# Patient Record
Sex: FEMALE | Race: WHITE | NOT HISPANIC OR LATINO | Employment: OTHER | URBAN - METROPOLITAN AREA
[De-identification: names, ages, dates, MRNs, and addresses within clinical notes are randomized per-mention and may not be internally consistent; named-entity substitution may affect disease eponyms.]

---

## 2017-03-10 ENCOUNTER — OFFICE VISIT (OUTPATIENT)
Dept: URGENT CARE | Facility: CLINIC | Age: 77
End: 2017-03-10
Payer: COMMERCIAL

## 2017-03-10 PROCEDURE — 99213 OFFICE O/P EST LOW 20 MIN: CPT

## 2017-03-10 PROCEDURE — S9088 SERVICES PROVIDED IN URGENT: HCPCS

## 2017-03-31 ENCOUNTER — TRANSCRIBE ORDERS (OUTPATIENT)
Dept: LAB | Facility: CLINIC | Age: 77
End: 2017-03-31

## 2017-03-31 ENCOUNTER — APPOINTMENT (OUTPATIENT)
Dept: LAB | Facility: CLINIC | Age: 77
End: 2017-03-31
Payer: COMMERCIAL

## 2017-03-31 ENCOUNTER — ALLSCRIPTS OFFICE VISIT (OUTPATIENT)
Dept: OTHER | Facility: OTHER | Age: 77
End: 2017-03-31

## 2017-03-31 DIAGNOSIS — Z12.11 ENCOUNTER FOR SCREENING FOR MALIGNANT NEOPLASM OF COLON: ICD-10-CM

## 2017-03-31 DIAGNOSIS — M10.9 GOUT: ICD-10-CM

## 2017-03-31 DIAGNOSIS — M81.0 AGE-RELATED OSTEOPOROSIS WITHOUT CURRENT PATHOLOGICAL FRACTURE: ICD-10-CM

## 2017-03-31 DIAGNOSIS — Z12.31 ENCOUNTER FOR SCREENING MAMMOGRAM FOR MALIGNANT NEOPLASM OF BREAST: ICD-10-CM

## 2017-03-31 DIAGNOSIS — I10 ESSENTIAL (PRIMARY) HYPERTENSION: ICD-10-CM

## 2017-03-31 DIAGNOSIS — E78.5 HYPERLIPIDEMIA: ICD-10-CM

## 2017-03-31 LAB
25(OH)D3 SERPL-MCNC: 10.8 NG/ML (ref 30–100)
ALBUMIN SERPL BCP-MCNC: 3.9 G/DL (ref 3.5–5)
ALP SERPL-CCNC: 75 U/L (ref 46–116)
ALT SERPL W P-5'-P-CCNC: 21 U/L (ref 12–78)
ANION GAP SERPL CALCULATED.3IONS-SCNC: 6 MMOL/L (ref 4–13)
AST SERPL W P-5'-P-CCNC: 11 U/L (ref 5–45)
BASOPHILS # BLD AUTO: 0.06 THOUSANDS/ΜL (ref 0–0.1)
BASOPHILS NFR BLD AUTO: 1 % (ref 0–1)
BILIRUB SERPL-MCNC: 0.45 MG/DL (ref 0.2–1)
BUN SERPL-MCNC: 35 MG/DL (ref 5–25)
CALCIUM SERPL-MCNC: 9.4 MG/DL (ref 8.3–10.1)
CHLORIDE SERPL-SCNC: 104 MMOL/L (ref 100–108)
CO2 SERPL-SCNC: 29 MMOL/L (ref 21–32)
CREAT SERPL-MCNC: 1.31 MG/DL (ref 0.6–1.3)
CREAT UR-MCNC: 89 MG/DL
EOSINOPHIL # BLD AUTO: 0.69 THOUSAND/ΜL (ref 0–0.61)
EOSINOPHIL NFR BLD AUTO: 9 % (ref 0–6)
ERYTHROCYTE [DISTWIDTH] IN BLOOD BY AUTOMATED COUNT: 14.2 % (ref 11.6–15.1)
GFR SERPL CREATININE-BSD FRML MDRD: 39.4 ML/MIN/1.73SQ M
GLUCOSE P FAST SERPL-MCNC: 92 MG/DL (ref 65–99)
HCT VFR BLD AUTO: 39 % (ref 34.8–46.1)
HGB BLD-MCNC: 12.3 G/DL (ref 11.5–15.4)
LYMPHOCYTES # BLD AUTO: 1.9 THOUSANDS/ΜL (ref 0.6–4.47)
LYMPHOCYTES NFR BLD AUTO: 25 % (ref 14–44)
MCH RBC QN AUTO: 28.9 PG (ref 26.8–34.3)
MCHC RBC AUTO-ENTMCNC: 31.5 G/DL (ref 31.4–37.4)
MCV RBC AUTO: 92 FL (ref 82–98)
MICROALBUMIN UR-MCNC: 6.2 MG/L (ref 0–20)
MICROALBUMIN/CREAT 24H UR: 7 MG/G CREATININE (ref 0–30)
MONOCYTES # BLD AUTO: 0.6 THOUSAND/ΜL (ref 0.17–1.22)
MONOCYTES NFR BLD AUTO: 8 % (ref 4–12)
NEUTROPHILS # BLD AUTO: 4.39 THOUSANDS/ΜL (ref 1.85–7.62)
NEUTS SEG NFR BLD AUTO: 57 % (ref 43–75)
NRBC BLD AUTO-RTO: 0 /100 WBCS
PLATELET # BLD AUTO: 307 THOUSANDS/UL (ref 149–390)
PMV BLD AUTO: 11.2 FL (ref 8.9–12.7)
POTASSIUM SERPL-SCNC: 4.3 MMOL/L (ref 3.5–5.3)
PROT SERPL-MCNC: 8.3 G/DL (ref 6.4–8.2)
RBC # BLD AUTO: 4.25 MILLION/UL (ref 3.81–5.12)
SODIUM SERPL-SCNC: 139 MMOL/L (ref 136–145)
TSH SERPL DL<=0.05 MIU/L-ACNC: 2.51 UIU/ML (ref 0.36–3.74)
URATE SERPL-MCNC: 5.5 MG/DL (ref 2–6.8)
WBC # BLD AUTO: 7.65 THOUSAND/UL (ref 4.31–10.16)

## 2017-03-31 PROCEDURE — 82043 UR ALBUMIN QUANTITATIVE: CPT

## 2017-03-31 PROCEDURE — 82570 ASSAY OF URINE CREATININE: CPT

## 2017-03-31 PROCEDURE — 84443 ASSAY THYROID STIM HORMONE: CPT

## 2017-03-31 PROCEDURE — 36415 COLL VENOUS BLD VENIPUNCTURE: CPT

## 2017-03-31 PROCEDURE — 84550 ASSAY OF BLOOD/URIC ACID: CPT

## 2017-03-31 PROCEDURE — 80053 COMPREHEN METABOLIC PANEL: CPT

## 2017-03-31 PROCEDURE — 85025 COMPLETE CBC W/AUTO DIFF WBC: CPT

## 2017-03-31 PROCEDURE — 82306 VITAMIN D 25 HYDROXY: CPT

## 2017-04-02 ENCOUNTER — GENERIC CONVERSION - ENCOUNTER (OUTPATIENT)
Dept: OTHER | Facility: OTHER | Age: 77
End: 2017-04-02

## 2017-04-19 ENCOUNTER — APPOINTMENT (OUTPATIENT)
Dept: LAB | Facility: CLINIC | Age: 77
End: 2017-04-19
Payer: COMMERCIAL

## 2017-04-19 ENCOUNTER — TRANSCRIBE ORDERS (OUTPATIENT)
Dept: LAB | Facility: CLINIC | Age: 77
End: 2017-04-19

## 2017-04-19 DIAGNOSIS — Z12.11 ENCOUNTER FOR SCREENING FOR MALIGNANT NEOPLASM OF COLON: ICD-10-CM

## 2017-04-19 LAB — HEMOCCULT STL QL IA: NEGATIVE

## 2017-04-19 PROCEDURE — G0328 FECAL BLOOD SCRN IMMUNOASSAY: HCPCS

## 2017-04-20 ENCOUNTER — GENERIC CONVERSION - ENCOUNTER (OUTPATIENT)
Dept: OTHER | Facility: OTHER | Age: 77
End: 2017-04-20

## 2017-05-25 ENCOUNTER — GENERIC CONVERSION - ENCOUNTER (OUTPATIENT)
Dept: OTHER | Facility: OTHER | Age: 77
End: 2017-05-25

## 2017-05-31 ENCOUNTER — GENERIC CONVERSION - ENCOUNTER (OUTPATIENT)
Dept: OTHER | Facility: OTHER | Age: 77
End: 2017-05-31

## 2017-10-02 ENCOUNTER — ALLSCRIPTS OFFICE VISIT (OUTPATIENT)
Dept: OTHER | Facility: OTHER | Age: 77
End: 2017-10-02

## 2017-10-02 DIAGNOSIS — E78.5 HYPERLIPIDEMIA: ICD-10-CM

## 2017-10-02 DIAGNOSIS — I10 ESSENTIAL (PRIMARY) HYPERTENSION: ICD-10-CM

## 2017-10-03 NOTE — PROGRESS NOTES
Assessment  1  Hypertension (401 9) (I10)   2  CKD (chronic kidney disease), stage III (585 3) (N18 3)   3  Gout (274 9) (M10 9)   4  Hyperlipidemia (272 4) (E78 5)   5  Osteoporosis (733 00) (M81 0)   6  Peripheral vascular disease (443 9) (I73 9)    Plan  Gout    · Allopurinol 100 MG Oral Tablet; TAKE 1 TABLET DAILY  Health Maintenance    · Fluzone High-Dose 0 5 ML Intramuscular Suspension Prefilled Syringe; INJECT 0 5  ML  Intramuscular; To Be Done: [id number]  Hyperlipidemia    · Simvastatin 40 MG Oral Tablet; TAKE 1 TABLET DAILY   · (1) LIPID PANEL FASTING W DIRECT LDL REFLEX; Status:Active; Requested for:02Oct2017;    · Eat a low fat and low cholesterol diet ; Status:Complete;   Done: 10GJH2464  Hypertension    · Atenolol 25 MG Oral Tablet; TAKE 1 TABLET DAILY AS DIRECTED   · HydroCHLOROthiazide 50 MG Oral Tablet; TAKE 1 TABLET DAILY   · Lisinopril 40 MG Oral Tablet; TAKE 1 TABLET DAILY   · Pentoxifylline  MG Oral Tablet Extended Release; 1 capsule po 3 times daily   · (1) COMPREHENSIVE METABOLIC PANEL; Status:Active; Requested MUR:71XZQ5999;    · A diet low in sodium and high in potassium, magnesium, and calcium can help your blood pressure ;  Status:Complete;   Done: 67IRQ2174   · Begin a limited exercise program ; Status:Complete;   Done: 64IVO1101   · Begin or continue regular aerobic exercise  Gradually work up to at least {count1} sessions of  {dur1} of exercise a week ; Status:Complete;   Done: 02Oct2017  Need for influenza vaccination    · Fluzone High-Dose 0 5 ML Intramuscular Suspension Prefilled Syringe  Osteoporosis    · Alendronate Sodium 70 MG Oral Tablet; TAKE 1 TABLET ONCE WEEKLY  Peripheral vascular disease    · Clopidogrel Bisulfate 75 MG Oral Tablet; TAKE 1 TABLET DAILY    Discussion/Summary  Discussion Summary:   Doing well and will check labs  Reports OP readings are good and well continue with current treatments  Up date flu vaccine  RTC four months     Medication SE Review and Pt Understands Tx: Possible side effects of new medications were reviewed with the patient/guardian today  The treatment plan was reviewed with the patient/guardian  The patient/guardian understands and agrees with the treatment plan      Chief Complaint  Chief Complaint Free Text Note Form: RTN - Gout, HTN, Hypercholesterol  Melissa Sinks Melissa Sinks Melissa Sinks No complaints  History of Present Illness  HPI: WF RTC for f/u htn, hyperlipidemia, etc  Doing well and no c/o's  BP is up, but reports OP readings are good  Due for labs and flu vaccine  Gout (Follow-Up): The patient is being seen for follow-up of gout  The patient reports doing well  She has had no significant interval events  The patient is currently asymptomatic  Associated symptoms: no fever-and-no chills  Medications include naproxen and allopurinol  Medications:  the patient is adherent to her medication regimen, but-she denies medication side effects  Disease management:  the patient is doing well with her goals  Osteoporosis (Follow-Up): The patient's osteoporosis has been stable since the last visit  Most recent DXA results: T-score <-2 5  She has no comorbid illnesses  She has no complications  She has no significant interval events  Symptoms: The patient is currently asymptomatic  Associated symptoms include no decrease in height  Medications: the patient is adherent with her medication regimen -She denies medication side effects  Medication(s): vitamin D,-a calcium supplement-and-a bisphosphonate  Disease Management: the patient is doing well with her osteoporosis goals  Hyperlipidemia (Follow-Up): The patient states her hyperlipidemia has been under good control since the last visit  Comorbid Illnesses: peripheral vascular disease-and-hypertension  She has no significant interval events  Symptoms: The patient is currently asymptomatic  Associated symptoms include no focal neurologic deficits-and-no memory loss     Medications: the patient is adherent with her medication regimen -She denies medication side effects  Medication(s): a statin-and-fish oil  The patient is doing well with her hyperlipidemia goals  The patient is due for a lipid panel-and-liver function tests  Hypertension (Follow-Up): The patient presents for follow-up of essential hypertension  The patient states she has been doing well with her blood pressure control since the last visit  Comorbid Illnesses: a stroke-and-peripheral vascular disease  She has no significant interval events  Symptoms: The patient is currently asymptomatic  Associated symptoms include no headache-and-no focal neurologic deficits  Home monitoring: The patient checks her blood pressure regularly  Blood pressure control has been good  Medications: the patient is adherent with her medication regimen -She denies medication side effects  Medication(s): a diuretic,-a beta blocking agent-and-an ACE inhibitor  Disease Management: the patient is doing well with her blood pressure goals  The patient is due for a lipid panel-and-a serum creatinine  Peripheral Vascular Disease (Follow-Up): The patient is being seen for follow-up of peripheral vascular disease  The patient reports doing well  She has had no significant interval events  The patient is currently asymptomatic  Medications include clopidogrel (Plavix), pentoxifylline (Trental), statins and antihypertensives  Medications:  the patient is adherent to her medication regimen, but-she denies medication side effects  Disease management:  the patient is doing well with her goals  Due for: lipid profile  Review of Systems  Complete-Female:   Constitutional: no fever,-not feeling poorly,-no chills-and-not feeling tired  Cardiovascular: no chest pain,-no intermittent leg claudication,-no palpitations-and-no lower extremity edema  Respiratory: no shortness of breath,-no cough,-no orthopnea,-no wheezing,-no shortness of breath during exertion-and-no PND  Gastrointestinal: no abdominal pain,-no nausea,-no constipation-and-no diarrhea  Genitourinary: No complaints of dysuria, no incontinence, no pelvic pain, no dysmenorrhea, no vaginal discharge or bleeding  Musculoskeletal: No complaints of arthralgias, no myalgias, no joint swelling or stiffness, no limb pain or swelling  Integumentary: No complaints of skin rash or lesions, no itching, no skin wounds, no breast pain or lump  Neurological: no headache,-no confusion-and-no convulsions  Psychiatric: no anxiety-and-no depression  Endocrine: No complaints of proptosis, no hot flashes, no muscle weakness, no deepening of the voice, no feelings of weakness  Hematologic/Lymphatic: No complaints of swollen glands, no swollen glands in the neck, does not bleed easily, does not bruise easily  Active Problems  1  Abnormal EKG (794 31) (R94 31)   2  Abnormal mammogram (793 80) (R92 8)   3  Abnormal thyroid blood test (794 5) (R94 6)   4  CKD (chronic kidney disease), stage III (585 3) (N18 3)   5  Claudication (443 9) (I73 9)   6  Encounter for screening mammogram for malignant neoplasm of breast (V76 12) (Z12 31)   7  Gout (274 9) (M10 9)   8  Hyperlipidemia (272 4) (E78 5)   9  Hypertension (401 9) (I10)   10  Need for influenza vaccination (V04 81) (Z23)   11  Osteoporosis (733 00) (M81 0)   12  Peripheral vascular disease (443 9) (I73 9)   13  Post-menopausal (V49 81) (Z78 0)   14  Screen for colon cancer (V76 51) (Z12 11)   15  Screening for genitourinary condition (V81 6) (Z13 89)   16  Screening for neurological condition (V80 09) (Z13 89)   17  TIA (transient ischemic attack) (435 9) (G45 9)    Past Medical History  1  History of Diarrhea, unspecified type (787 91) (R19 7)  Active Problems And Past Medical History Reviewed: The active problems and past medical history were reviewed and updated today  Surgical History  1  History of Appendectomy   2  History of Hysterectomy   3   History of Tubal Ligation   4  History of Uterine Surgery  Surgical History Reviewed: The surgical history was reviewed and updated today  Family History  Mother    1  Family history of cerebrovascular accident (CVA) (V17 1) (Z82 3)  Father    2  Family history of Coal workers' pneumoconiosis   3  Family history of hypertension (V17 49) (Z82 49)  Family History Reviewed: The family history was reviewed and updated today  Social History   · Former smoker (T10 97) (T38 717)   · Four children   · Lives with adult children   · No alcohol use   · Retired   ·  (V61 07) (Z63 4)  Social History Reviewed: The social history was reviewed and updated today  The social history was reviewed and is unchanged  Current Meds   1  Alendronate Sodium 70 MG Oral Tablet; TAKE 1 TABLET ONCE WEEKLY; Therapy: 58CIH1837 to (Evaluate:21Sep2017)  Requested for: 93MEX4528; Last Rx:01Jun2017   Ordered   2  Allopurinol 100 MG Oral Tablet; TAKE 1 TABLET DAILY  Requested for: 14NGV2914; Last   Rx:31Mar2017 Ordered   3  Atenolol 25 MG Oral Tablet; TAKE 1 TABLET DAILY AS DIRECTED; Therapy: 97RPZ0532 to (Evaluate:26Mar2018)  Requested for: 16MGF0622; Last Rx:31Mar2017   Ordered   4  Clopidogrel Bisulfate 75 MG Oral Tablet; TAKE 1 TABLET DAILY; Therapy: 36UWA3151 to (Evaluate:26Mar2018)  Requested for: 95BUR1462; Last Rx:31Mar2017   Ordered   5  HydroCHLOROthiazide 50 MG Oral Tablet; TAKE 1 TABLET DAILY; Therapy: 69MZP5088 to (Evaluate:26Mar2018)  Requested for: 39PJY4890; Last Rx:31Mar2017   Ordered   6  Lisinopril 40 MG Oral Tablet; TAKE 1 TABLET DAILY; Therapy: 42AKE7919 to (Evaluate:26Mar2018)  Requested for: 00UGZ4783; Last Rx:31Mar2017   Ordered   7  Multi Adult Gummies Oral Tablet Chewable; CHEW AND SWALLOW 1 TABLET DAILY; Therapy: 10JWN8696 to Recorded   8  Omega 3 1000 MG Oral Capsule; Take 2 capsules three times daily; Therapy: 95WNE9045 to Recorded   9   Pentoxifylline  MG Oral Tablet Extended Release; 1 capsule po 3 times daily; Therapy: 33YMA3129 to (Evaluate:26Mar2018)  Requested for: 52EGP7294; Last Rx:31Mar2017   Ordered   10  Simvastatin 40 MG Oral Tablet; TAKE 1 TABLET DAILY; Therapy: 79BTW6455 to (Evaluate:26Mar2018)  Requested for: 88NEM9347; Last Rx:31Mar2017    Ordered  Medication List Reviewed: The medication list was reviewed and updated today  Allergies  1  Endocet TABS    Vitals  Vital Signs    Recorded: 23LOQ0491 04:29PM   Temperature 97 9 F   Heart Rate 72   Respiration 18   Systolic 719   Diastolic 60   Height 5 ft 4 in   Weight 168 lb    BMI Calculated 28 84   BSA Calculated 1 82     Physical Exam    Constitutional   General appearance: No acute distress, well appearing and well nourished  Eyes   Conjunctiva and lids: No swelling, erythema or discharge  Pupils and irises: Equal, round and reactive to light  Ears, Nose, Mouth, and Throat   Oropharynx: Normal with no erythema, edema, exudate or lesions  Pulmonary   Respiratory effort: No increased work of breathing or signs of respiratory distress  Auscultation of lungs: Clear to auscultation  Cardiovascular   Auscultation of heart: Normal rate and rhythm, normal S1 and S2, without murmurs  Examination of extremities for edema and/or varicosities: Normal     Carotid pulses: Normal     Abdomen   Abdomen: Non-tender, no masses      Musculoskeletal   Gait and station: Normal     Psychiatric   Orientation to person, place, and time: Normal     Mood and affect: Normal          Signatures   Electronically signed by : IAN Peterson ; Oct  2 2017  5:05PM EST                       (Author)

## 2017-10-31 ENCOUNTER — APPOINTMENT (OUTPATIENT)
Dept: LAB | Facility: CLINIC | Age: 77
End: 2017-10-31
Payer: COMMERCIAL

## 2017-10-31 ENCOUNTER — TRANSCRIBE ORDERS (OUTPATIENT)
Dept: LAB | Facility: CLINIC | Age: 77
End: 2017-10-31

## 2017-10-31 DIAGNOSIS — E78.5 HYPERLIPIDEMIA: ICD-10-CM

## 2017-10-31 DIAGNOSIS — I10 ESSENTIAL (PRIMARY) HYPERTENSION: ICD-10-CM

## 2017-10-31 LAB
ALBUMIN SERPL BCP-MCNC: 3.4 G/DL (ref 3.5–5)
ALP SERPL-CCNC: 77 U/L (ref 46–116)
ALT SERPL W P-5'-P-CCNC: 20 U/L (ref 12–78)
ANION GAP SERPL CALCULATED.3IONS-SCNC: 7 MMOL/L (ref 4–13)
AST SERPL W P-5'-P-CCNC: 14 U/L (ref 5–45)
BILIRUB SERPL-MCNC: 0.52 MG/DL (ref 0.2–1)
BUN SERPL-MCNC: 41 MG/DL (ref 5–25)
CALCIUM SERPL-MCNC: 8.6 MG/DL (ref 8.3–10.1)
CHLORIDE SERPL-SCNC: 104 MMOL/L (ref 100–108)
CHOLEST SERPL-MCNC: 218 MG/DL (ref 50–200)
CO2 SERPL-SCNC: 27 MMOL/L (ref 21–32)
CREAT SERPL-MCNC: 1.67 MG/DL (ref 0.6–1.3)
GFR SERPL CREATININE-BSD FRML MDRD: 29 ML/MIN/1.73SQ M
GLUCOSE P FAST SERPL-MCNC: 94 MG/DL (ref 65–99)
HDLC SERPL-MCNC: 46 MG/DL (ref 40–60)
LDLC SERPL CALC-MCNC: 147 MG/DL (ref 0–100)
POTASSIUM SERPL-SCNC: 3.9 MMOL/L (ref 3.5–5.3)
PROT SERPL-MCNC: 8 G/DL (ref 6.4–8.2)
SODIUM SERPL-SCNC: 138 MMOL/L (ref 136–145)
TRIGL SERPL-MCNC: 125 MG/DL

## 2017-10-31 PROCEDURE — 80061 LIPID PANEL: CPT

## 2017-10-31 PROCEDURE — 80053 COMPREHEN METABOLIC PANEL: CPT

## 2017-10-31 PROCEDURE — 36415 COLL VENOUS BLD VENIPUNCTURE: CPT

## 2017-11-01 ENCOUNTER — GENERIC CONVERSION - ENCOUNTER (OUTPATIENT)
Dept: OTHER | Facility: OTHER | Age: 77
End: 2017-11-01

## 2017-11-02 ENCOUNTER — OFFICE VISIT (OUTPATIENT)
Dept: URGENT CARE | Facility: CLINIC | Age: 77
End: 2017-11-02
Payer: COMMERCIAL

## 2017-11-02 DIAGNOSIS — N18.30 CHRONIC KIDNEY DISEASE, STAGE III (MODERATE) (HCC): ICD-10-CM

## 2017-11-02 PROCEDURE — S9088 SERVICES PROVIDED IN URGENT: HCPCS

## 2017-11-02 PROCEDURE — 99213 OFFICE O/P EST LOW 20 MIN: CPT

## 2017-11-03 NOTE — PROGRESS NOTES
Assessment  1  Sinusitis (473 9) (J32 9)    Plan  Sinusitis    · Amoxicillin 500 MG Oral Capsule; TAKE 1 CAPSULE 3 TIMES DAILY UNTIL GONE    Discussion/Summary  Discussion Summary:   Patient was given instructions with respect to the amoxicillin its use and sinusitis  Medication Side Effects Reviewed: Possible side effects of new medications were reviewed with the patient/guardian today  Understands and agrees with treatment plan: The treatment plan was reviewed with the patient/guardian  The patient/guardian understands and agrees with the treatment plan   Counseling Documentation With Imm: The patient was counseled regarding instructions for management  Immunization Counseling The parent/guardian was counseled on the following vaccine components:  Follow Up Instructions: Follow Up with your Primary Care Provider in 3-5 days  If your symptoms worsen, go to the nearest [facility] Emergency Department  Chief Complaint  1  Ear Pain  Chief Complaint Free Text Note Form: Pt c/o bilateral ear pain and sinus pressure since Friday  History of Present Illness  HPI: This patient is a very pleasant 49-year-old female who complains of sinus pressure in her frontal and maxillary sinuses since Monday  Over the weekend she was doing well now her ear seen full  She denies any fever or chills her throat is normal and she has no cough she did have a slight nose bleed in the last 24 hours from the right nostril  Her medications and allergies are reviewed   Hospital Based Practices Required Assessment:   Pain Assessment   the patient states they have pain  The pain is located in the sinus bilaterally  (on a scale of 0 to 10, the patient rates the pain at 4 )   Abuse And Domestic Violence Screen    Yes, the patient is safe at home  -- The patient states no one is hurting them  Depression And Suicide Screen  No, the patient has not had thoughts of hurting themself     No, the patient has not felt depressed in the past 7 days  Prefered Language is  [place]  Primary Language is  English  Education Completed: disease/condition-- and-- medications   Teaching Method: verbal   Person Taught: patient   Evaluation Of Learning: verbalized/demonstrated understanding      Review of Systems  Focused-Female:   Constitutional: No fever, no chills, feels well, no tiredness, no recent weight gain or loss  ENT: as noted in HPI  Cardiovascular: no complaints of slow or fast heart rate, no chest pain, no palpitations, no leg claudication or lower extremity edema  Respiratory: no complaints of shortness of breath, no wheezing, no dyspnea on exertion, no orthopnea or PND  Gastrointestinal: no complaints of abdominal pain, no constipation, no nausea or diarrhea, no vomiting, no bloody stools  Musculoskeletal: no complaints of arthralgia, no myalgia, no joint swelling or stiffness, no limb pain or swelling  Integumentary: no complaints of skin rash or lesion, no itching or dry skin, no skin wounds  Neurological: no complaints of headache, no confusion, no numbness or tingling, no dizziness or fainting  ROS Reviewed:   ROS reviewed  Active Problems  1  Abnormal EKG (794 31) (R94 31)   2  Abnormal mammogram (793 80) (R92 8)   3  Abnormal thyroid blood test (794 5) (R94 6)   4  CKD (chronic kidney disease), stage III (585 3) (N18 3)   5  Claudication (443 9) (I73 9)   6  Encounter for screening mammogram for malignant neoplasm of breast (V76 12)   (Z12 31)   7  Gout (274 9) (M10 9)   8  Hyperlipidemia (272 4) (E78 5)   9  Hypertension (401 9) (I10)   10  Need for influenza vaccination (V04 81) (Z23)   11  Osteoporosis (733 00) (M81 0)   12  Peripheral vascular disease (443 9) (I73 9)   13  Post-menopausal (V49 81) (Z78 0)   14  Screen for colon cancer (V76 51) (Z12 11)   15  Screening for genitourinary condition (V81 6) (Z13 89)   16  Screening for neurological condition (V80 09) (Z13 89)   17   TIA (transient ischemic attack) (435 9) (G45 9)    Past Medical History  1  History of Diarrhea, unspecified type (787 91) (R19 7)  Active Problems And Past Medical History Reviewed: The active problems and past medical history were reviewed and updated today  Family History  Mother    1  Family history of cerebrovascular accident (CVA) (V17 1) (Z82 3)  Father    2  Family history of Coal workers' pneumoconiosis   3  Family history of hypertension (V17 49) (Z82 49)  Family History Reviewed: The family history was reviewed and updated today  Social History   · Former smoker (H67 75) (I86 790)   · Four children   · Lives with adult children   · No alcohol use   · Retired   ·  (V61 07) (Z63 4)  Social History Reviewed: The social history was reviewed and updated today  Surgical History  1  History of Appendectomy   2  History of Hysterectomy   3  History of Tubal Ligation   4  History of Uterine Surgery  Surgical History Reviewed: The surgical history was reviewed and updated today  Current Meds   1  Alendronate Sodium 70 MG Oral Tablet; TAKE 1 TABLET ONCE WEEKLY; Therapy: 89RXU0746 to (Evaluate:10Ujb2095)  Requested for: 37BTP5788; Last   Rx:02Oct2017 Ordered   2  Allopurinol 100 MG Oral Tablet; TAKE 1 TABLET DAILY  Requested for: 32WZW4990; Last   Rx:02Oct2017 Ordered   3  Atenolol 25 MG Oral Tablet; TAKE 1 TABLET DAILY AS DIRECTED; Therapy: 15LNU7281 to (Evaluate:99Jeb6367)  Requested for: 02Oct2017; Last   Rx:02Oct2017 Ordered   4  Atorvastatin Calcium 40 MG Oral Tablet; Take 1 tablet daily as directed; Therapy: 22PAM6145 to (Last Rx:02Nov2017)  Requested for: 58ZEV5104 Ordered   5  Clopidogrel Bisulfate 75 MG Oral Tablet; TAKE 1 TABLET DAILY; Therapy: 91XFG0250 to (Evaluate:99Kav8832)  Requested for: 21GAC1909; Last   Rx:02Nov2017 Ordered   6  HydroCHLOROthiazide 50 MG Oral Tablet; TAKE 1 TABLET DAILY;    Therapy: 65KXW2315 to (Evaluate:99Lhv1469)  Requested for: 76JPJ6531; Last   Rx:02Oct2017 Ordered   7  Lisinopril 40 MG Oral Tablet; TAKE 1 TABLET DAILY; Therapy: 27IQJ3968 to (Evaluate:52Tzn7618)  Requested for: 02Oct2017; Last   Rx:02Oct2017 Ordered   8  Multi Adult Gummies Oral Tablet Chewable; CHEW AND SWALLOW 1 TABLET DAILY; Therapy: 56RUE9057 to Recorded   9  Omega 3 1000 MG Oral Capsule; Take 2 capsules three times daily; Therapy: 59KYY0566 to Recorded   10  Pentoxifylline  MG Oral Tablet Extended Release; 1 capsule po 3 times daily; Therapy: 77FWK5370 to (Evaluate:28Oct2018)  Requested for: 08HOE8226; Last    Rx:02Nov2017 Ordered    Allergies  1  Endocet TABS    Vitals  Signs   Recorded: 70BLX9143 03:08PM   Temperature: 98 F  Heart Rate: 61  Respiration: 18  Systolic: 772  Diastolic: 69  Weight: 863 lb 8 42 oz  BMI Calculated: 26 87  BSA Calculated: 1 76  O2 Saturation: 99    Physical Exam    Eyes   Conjunctiva and lids: No swelling, erythema or discharge  Pupils and irises: Equal, round and reactive to light  Ears, Nose, Mouth, and Throat percussion of the sinuses frontally and maxillary sinuses year old slight discomfort  Otoscopic examination: Tympanic membranes translucent with normal light reflex  Canals patent without erythema  Nasal mucosa, septum, and turbinates: Abnormal  -- there is slight scabbing at the right internal nares  Oropharynx: Normal with no erythema, edema, exudate or lesions  Pulmonary   Auscultation of lungs: Clear to auscultation  Cardiovascular   Auscultation of heart: Normal rate and rhythm, normal S1 and S2, without murmurs  Lymphatic   Palpation of lymph nodes in neck: No lymphadenopathy  Psychiatric   Orientation to person, place, and time: Normal        Future Appointments    Date/Time Provider Specialty Site   04/02/2018 05:15 PM IAN Adrian   Internal Medicine [address] [facility]     Signatures   Electronically signed by : Azar Roldan DO; Nov 2 2017  3:21PM EST                       (Author)

## 2018-01-12 VITALS
BODY MASS INDEX: 28.51 KG/M2 | SYSTOLIC BLOOD PRESSURE: 138 MMHG | WEIGHT: 167 LBS | HEART RATE: 70 BPM | HEIGHT: 64 IN | RESPIRATION RATE: 18 BRPM | TEMPERATURE: 97.7 F | DIASTOLIC BLOOD PRESSURE: 66 MMHG

## 2018-01-12 NOTE — RESULT NOTES
Verified Results  (1) COMPREHENSIVE METABOLIC PANEL 43ACP1136 58:88HH Faizan Filao Order Number: [id number]     Test Name Result Flag Reference   SODIUM 138 mmol/L  136-145   POTASSIUM 3 9 mmol/L  3 5-5 3   CHLORIDE 104 mmol/L  100-108   CARBON DIOXIDE 27 mmol/L  21-32   ANION GAP (CALC) 7 mmol/L  4-13   BLOOD UREA NITROGEN 41 mg/dL H 5-25   CREATININE 1 67 mg/dL H 0 60-1 30   Standardized to IDMS reference method   CALCIUM 8 6 mg/dL  8 3-10 1   BILI, TOTAL 0 52 mg/dL  0 20-1 00   ALK PHOSPHATAS 77 U/L     ALT (SGPT) 20 U/L  12-78   Specimen collection should occur prior to Sulfasalazine and/or Sulfapyridine administration due to the potential for falsely depressed results  AST(SGOT) 14 U/L  5-45   Specimen collection should occur prior to Sulfasalazine administration due to the potential for falsely depressed results  ALBUMIN 3 4 g/dL L 3 5-5 0   TOTAL PROTEIN 8 0 g/dL  6 4-8 2   eGFR 29 ml/min/1 73sq m     [facility] Disease Education Program recommendations are as follows:  GFR calculation is accurate only with a steady state creatinine  Chronic Kidney disease less than 60 ml/min/1 73 sq  meters  Kidney failure less than 15 ml/min/1 73 sq  meters  GLUCOSE FASTING 94 mg/dL  65-99   Specimen collection should occur prior to Sulfasalazine administration due to the potential for falsely depressed results  Specimen collection should occur prior to Sulfapyridine administration due to the potential for falsely elevated results       (1) LIPID PANEL FASTING W DIRECT LDL REFLEX 31Oct2017 08:34AM Faizan Filao Order Number: [id number]     Test Name Result Flag Reference   CHOLESTEROL 218 mg/dL H    LDL CHOLESTEROL CALCULATED 147 mg/dL H 0-100   Triglyceride:        Normal <150 mg/dl   Borderline High 150-199 mg/dl   High 200-499 mg/dl   Very High >499 mg/dl      Cholesterol:       Desirable <200 mg/dl    Borderline High 200-239 mg/dl    High >239 mg/dl      HDL Cholesterol:       High>59 mg/dL    Low <41 mg/dL      HDL Cholesterol:       High>59 mg/dL    Low <41 mg/dL      This screening LDL is a calculated result  It does not have the accuracy of the Direct Measured LDL in the monitoring of patients with hyperlipidemia and/or statin therapy  Direct Measure LDL (KUU206) must be ordered separately in these patients  TRIGLYCERIDES 125 mg/dL  <=150   Specimen collection should occur prior to N-Acetylcysteine or Metamizole administration due to the potential for falsely depressed results  HDL,DIRECT 46 mg/dL  40-60   Specimen collection should occur prior to Metamizole administration due to the potential for falsley depressed results

## 2018-01-14 VITALS
HEART RATE: 72 BPM | TEMPERATURE: 97.9 F | DIASTOLIC BLOOD PRESSURE: 60 MMHG | RESPIRATION RATE: 18 BRPM | SYSTOLIC BLOOD PRESSURE: 150 MMHG | HEIGHT: 64 IN | WEIGHT: 168 LBS | BODY MASS INDEX: 28.68 KG/M2

## 2018-01-16 NOTE — RESULT NOTES
Verified Results  (1) MICROALBUMIN CREATININE RATIO, RANDOM URINE [id number] 09:11AM ZeePearlner     Test Name Result Flag Reference   MICROALBUMIN/ CREAT R 7 mg/g creatinine  0-30   MICROALBUMIN,URINE 6 2 mg/L  0 0-20 0   CREATININE URINE 89 0 mg/dL       (1) URIC ACID [id number] 09:10AM [facility]     Test Name Result Flag Reference   URIC ACID 5 5 mg/dL  2 0-6 8   Specimen collection should occur prior to Metamizole administration due to the potential for falsely depressed results  - Patient Instructions: This bloodwork is non-fasting  Please drink two glasses of water morning of bloodwork  (1) COMPREHENSIVE METABOLIC PANEL [id number] 08:42BP [facility]     Test Name Result Flag Reference   SODIUM 139 mmol/L  136-145   POTASSIUM 4 3 mmol/L  3 5-5 3   CHLORIDE 104 mmol/L  100-108   CARBON DIOXIDE 29 mmol/L  21-32   ANION GAP (CALC) 6 mmol/L  4-13   BLOOD UREA NITROGEN 35 mg/dL H 5-25   CREATININE 1 31 mg/dL H 0 60-1 30   Standardized to IDMS reference method   CALCIUM 9 4 mg/dL  8 3-10 1   BILI, TOTAL 0 45 mg/dL  0 20-1 00   ALK PHOSPHATAS 75 U/L     ALT (SGPT) 21 U/L  12-78   AST(SGOT) 11 U/L  5-45   ALBUMIN 3 9 g/dL  3 5-5 0   TOTAL PROTEIN 8 3 g/dL H 6 4-8 2   eGFR Non-African American 39 4 ml/min/1 73sq m     - Patient Instructions: This bloodwork is non-fasting  Please drink two glasses of water morning of bloodwork  National Kidney Disease Education Program recommendations are as follows:  GFR calculation is accurate only with a steady state creatinine  Chronic Kidney disease less than 60 ml/min/1 73 sq  meters  Kidney failure less than 15 ml/min/1 73 sq  meters  GLUCOSE FASTING 92 mg/dL  65-99     (1) TSH [id number] 09:10AM ZeePearlner     Test Name Result Flag Reference   TSH 2 510 uIU/mL  0 358-3 740   - Patient Instructions: This bloodwork is non-fasting  Please drink two glasses of water morning of bloodwork  - Patient Instructions: This bloodwork is non-fasting  Please drink two glasses of water morning of bloodwork  Patients undergoing fluorescein dye angiography may retain small amounts of fluorescein in the body for 48-72 hours post procedure  Samples containing fluorescein can produce falsely depressed TSH values  If the patient had this procedure,a specimen should be resubmitted post fluorescein clearance  The recommended reference ranges for TSH during pregnancy are as follows:  First trimester 0 1 to 2 5 uIU/mL  Second trimester  0 2 to 3 0 uIU/mL  Third trimester 0 3 to 3 0 uIU/m     (1) CBC/PLT/DIFF [id number] 09:10AM Luis Macias     Test Name Result Flag Reference   WBC COUNT 7 65 Thousand/uL  4 31-10 16   RBC COUNT 4 25 Million/uL  3 81-5 12   HEMOGLOBIN 12 3 g/dL  11 5-15 4   HEMATOCRIT 39 0 %  34 8-46  1   MCV 92 fL  82-98   MCH 28 9 pg  26 8-34 3   MCHC 31 5 g/dL  31 4-37 4   RDW 14 2 %  11 6-15 1   MPV 11 2 fL  8 9-12 7   PLATELET COUNT 954 Thousands/uL  149-390   nRBC AUTOMATED 0 /100 WBCs     NEUTROPHILS RELATIVE PERCENT 57 %  43-75   LYMPHOCYTES RELATIVE PERCENT 25 %  14-44   MONOCYTES RELATIVE PERCENT 8 %  4-12   EOSINOPHILS RELATIVE PERCENT 9 % H 0-6   BASOPHILS RELATIVE PERCENT 1 %  0-1   NEUTROPHILS ABSOLUTE COUNT 4 39 Thousands/? ??L  1 85-7 62   LYMPHOCYTES ABSOLUTE COUNT 1 90 Thousands/? ??L  0 60-4 47   MONOCYTES ABSOLUTE COUNT 0 60 Thousand/? ??L  0 17-1 22   EOSINOPHILS ABSOLUTE COUNT 0 69 Thousand/? ??L H 0 00-0 61   BASOPHILS ABSOLUTE COUNT 0 06 Thousands/? ??L  0 00-0 10   - Patient Instructions: This bloodwork is non-fasting  Please drink two glasses of water morning of bloodwork  - Patient Instructions: This bloodwork is non-fasting  Please drink two glasses of water morning of bloodwork       (1) VITAMIN D 25-HYDROXY [id number] 09:10AM Luis Macias     Test Name Result Flag Reference   VIT D 25-HYDROX 10 8 ng/mL L 30 0-100 0   This assay is a certified procedure of the CDC Vitamin D Standardization Certification Program (VDSCP) Deficiency <20ng/ml   Insufficiency 20-30ng/ml   Sufficient  ng/ml     *Patients undergoing fluorescein dye angiography may retain small amounts of fluorescein in the body for 48-72 hours post procedure  Samples containing fluorescein can produce falsely elevated Vitamin D values  If the patient had this procedure, a specimen should be resubmitted post fluorescein clearance

## 2018-01-17 NOTE — RESULT NOTES
Verified Results  (1) OCCULT BLOOD, FECAL IMMUNOCHEMICAL TEST 19Apr2017 02:30PM Elsi Wagner Order Number: [id number]     Test Name Result Flag Reference   OCCULT BLD, FECAL IMMUNOLOGICAL Negative  Negative   Performed by Fecal Immunochemical Test

## 2018-03-29 RX ORDER — SIMVASTATIN 40 MG
1 TABLET ORAL DAILY
COMMUNITY
Start: 2016-11-10 | End: 2018-04-02 | Stop reason: SDUPTHER

## 2018-03-29 RX ORDER — CITALOPRAM 20 MG/1
TABLET ORAL
COMMUNITY
Start: 2009-03-03 | End: 2018-04-02

## 2018-03-29 RX ORDER — ATENOLOL 25 MG/1
1 TABLET ORAL DAILY
COMMUNITY
Start: 2016-11-10 | End: 2018-04-02 | Stop reason: SDUPTHER

## 2018-03-29 RX ORDER — ALENDRONATE SODIUM 70 MG/1
1 TABLET ORAL WEEKLY
COMMUNITY
Start: 2017-06-01 | End: 2018-04-02 | Stop reason: SDUPTHER

## 2018-03-29 RX ORDER — HYDROCHLOROTHIAZIDE 50 MG/1
1 TABLET ORAL DAILY
COMMUNITY
Start: 2016-11-10 | End: 2018-04-02 | Stop reason: SDUPTHER

## 2018-03-29 RX ORDER — LISINOPRIL 40 MG/1
1 TABLET ORAL DAILY
COMMUNITY
Start: 2016-11-10 | End: 2018-04-02 | Stop reason: SDUPTHER

## 2018-03-29 RX ORDER — OMEGA-3-ACID ETHYL ESTERS 1 G/1
CAPSULE, LIQUID FILLED ORAL
COMMUNITY
Start: 2011-11-21 | End: 2018-04-02

## 2018-03-29 RX ORDER — ASCORBIC ACID 125 MG
1 TABLET,CHEWABLE ORAL DAILY
COMMUNITY
Start: 2016-11-10

## 2018-03-29 RX ORDER — BENZONATATE 100 MG/1
100 CAPSULE ORAL
COMMUNITY
Start: 2017-05-25 | End: 2018-04-02

## 2018-03-29 RX ORDER — ELECTROLYTES/DEXTROSE
SOLUTION, ORAL ORAL
COMMUNITY
Start: 2004-07-26 | End: 2018-04-02

## 2018-03-29 RX ORDER — PENTOXIFYLLINE 400 MG/1
TABLET, EXTENDED RELEASE ORAL
COMMUNITY
Start: 2016-11-10 | End: 2018-04-02 | Stop reason: SDUPTHER

## 2018-03-29 RX ORDER — POTASSIUM CHLORIDE 20 MEQ/1
TABLET, EXTENDED RELEASE ORAL
COMMUNITY
Start: 2009-03-25 | End: 2018-04-02

## 2018-03-29 RX ORDER — CLOPIDOGREL BISULFATE 75 MG/1
1 TABLET ORAL DAILY
COMMUNITY
Start: 2016-11-10 | End: 2018-04-02 | Stop reason: SDUPTHER

## 2018-03-29 RX ORDER — ALLOPURINOL 100 MG/1
1 TABLET ORAL DAILY
COMMUNITY
End: 2018-04-02 | Stop reason: SDUPTHER

## 2018-03-29 RX ORDER — CHLORAL HYDRATE 500 MG
CAPSULE ORAL
COMMUNITY
Start: 2016-11-10 | End: 2018-04-02

## 2018-03-30 PROBLEM — I73.9 CLAUDICATION (HCC): Status: ACTIVE | Noted: 2018-03-30

## 2018-03-30 PROBLEM — R79.89 ABNORMAL THYROID BLOOD TEST: Status: ACTIVE | Noted: 2017-03-31

## 2018-03-30 PROBLEM — G45.9 TIA (TRANSIENT ISCHEMIC ATTACK): Status: ACTIVE | Noted: 2017-03-31

## 2018-03-30 PROBLEM — N18.30 CKD (CHRONIC KIDNEY DISEASE), STAGE III (HCC): Status: ACTIVE | Noted: 2017-03-31

## 2018-03-30 PROBLEM — R92.8 ABNORMAL MAMMOGRAM: Status: ACTIVE | Noted: 2017-03-31

## 2018-03-30 PROBLEM — M35.3 POLYMYALGIA RHEUMATICA (HCC): Status: ACTIVE | Noted: 2018-03-30

## 2018-04-02 ENCOUNTER — OFFICE VISIT (OUTPATIENT)
Dept: INTERNAL MEDICINE CLINIC | Facility: CLINIC | Age: 78
End: 2018-04-02
Payer: COMMERCIAL

## 2018-04-02 VITALS
SYSTOLIC BLOOD PRESSURE: 152 MMHG | RESPIRATION RATE: 18 BRPM | DIASTOLIC BLOOD PRESSURE: 78 MMHG | BODY MASS INDEX: 27.14 KG/M2 | WEIGHT: 159 LBS | HEIGHT: 64 IN | TEMPERATURE: 99.6 F | HEART RATE: 72 BPM

## 2018-04-02 DIAGNOSIS — I63.9 CEREBROVASCULAR ACCIDENT (CVA), UNSPECIFIED MECHANISM (HCC): ICD-10-CM

## 2018-04-02 DIAGNOSIS — E78.2 MIXED HYPERLIPIDEMIA: Primary | ICD-10-CM

## 2018-04-02 DIAGNOSIS — R79.89 ABNORMAL THYROID BLOOD TEST: ICD-10-CM

## 2018-04-02 DIAGNOSIS — M35.3 POLYMYALGIA RHEUMATICA (HCC): ICD-10-CM

## 2018-04-02 DIAGNOSIS — Z12.11 SCREEN FOR COLON CANCER: ICD-10-CM

## 2018-04-02 DIAGNOSIS — M1A.9XX0 CHRONIC GOUT WITHOUT TOPHUS, UNSPECIFIED CAUSE, UNSPECIFIED SITE: ICD-10-CM

## 2018-04-02 DIAGNOSIS — I73.9 INTERMITTENT CLAUDICATION (HCC): ICD-10-CM

## 2018-04-02 DIAGNOSIS — N18.30 CKD (CHRONIC KIDNEY DISEASE), STAGE III (HCC): ICD-10-CM

## 2018-04-02 DIAGNOSIS — R41.3 MEMORY DIFFICULTIES: ICD-10-CM

## 2018-04-02 DIAGNOSIS — I10 ESSENTIAL HYPERTENSION: ICD-10-CM

## 2018-04-02 DIAGNOSIS — M81.0 AGE-RELATED OSTEOPOROSIS WITHOUT CURRENT PATHOLOGICAL FRACTURE: ICD-10-CM

## 2018-04-02 PROCEDURE — 3725F SCREEN DEPRESSION PERFORMED: CPT | Performed by: INTERNAL MEDICINE

## 2018-04-02 PROCEDURE — 99214 OFFICE O/P EST MOD 30 MIN: CPT | Performed by: INTERNAL MEDICINE

## 2018-04-02 PROCEDURE — 1101F PT FALLS ASSESS-DOCD LE1/YR: CPT | Performed by: INTERNAL MEDICINE

## 2018-04-02 RX ORDER — SIMVASTATIN 40 MG
40 TABLET ORAL DAILY
Qty: 30 TABLET | Refills: 5 | Status: SHIPPED | OUTPATIENT
Start: 2018-04-02 | End: 2018-05-21 | Stop reason: SDUPTHER

## 2018-04-02 RX ORDER — PENTOXIFYLLINE 400 MG/1
400 TABLET, EXTENDED RELEASE ORAL
Qty: 90 TABLET | Refills: 5 | Status: SHIPPED | OUTPATIENT
Start: 2018-04-02 | End: 2018-05-21 | Stop reason: SDUPTHER

## 2018-04-02 RX ORDER — LISINOPRIL 40 MG/1
40 TABLET ORAL DAILY
Qty: 30 TABLET | Refills: 5 | Status: SHIPPED | OUTPATIENT
Start: 2018-04-02 | End: 2018-05-21 | Stop reason: SDUPTHER

## 2018-04-02 RX ORDER — ATENOLOL 25 MG/1
25 TABLET ORAL DAILY
Qty: 30 TABLET | Refills: 5 | Status: SHIPPED | OUTPATIENT
Start: 2018-04-02 | End: 2018-05-21 | Stop reason: SDUPTHER

## 2018-04-02 RX ORDER — DONEPEZIL HYDROCHLORIDE 5 MG/1
5 TABLET, FILM COATED ORAL
Qty: 90 TABLET | Refills: 0 | Status: SHIPPED | OUTPATIENT
Start: 2018-04-02 | End: 2018-05-21 | Stop reason: SDUPTHER

## 2018-04-02 RX ORDER — CLOPIDOGREL BISULFATE 75 MG/1
75 TABLET ORAL DAILY
Qty: 30 TABLET | Refills: 5 | Status: SHIPPED | OUTPATIENT
Start: 2018-04-02 | End: 2018-05-21 | Stop reason: SDUPTHER

## 2018-04-02 RX ORDER — ALLOPURINOL 100 MG/1
100 TABLET ORAL DAILY
Qty: 30 TABLET | Refills: 5 | Status: SHIPPED | OUTPATIENT
Start: 2018-04-02 | End: 2018-05-21 | Stop reason: SDUPTHER

## 2018-04-02 RX ORDER — ALENDRONATE SODIUM 70 MG/1
70 TABLET ORAL WEEKLY
Qty: 30 TABLET | Refills: 5 | Status: SHIPPED | OUTPATIENT
Start: 2018-04-02 | End: 2018-04-30 | Stop reason: SDUPTHER

## 2018-04-02 RX ORDER — HYDROCHLOROTHIAZIDE 50 MG/1
50 TABLET ORAL DAILY
Qty: 30 TABLET | Refills: 5 | Status: SHIPPED | OUTPATIENT
Start: 2018-04-02 | End: 2018-05-21 | Stop reason: SDUPTHER

## 2018-04-02 NOTE — PATIENT INSTRUCTIONS
Hypertension   AMBULATORY CARE:   Hypertension  is high blood pressure (BP)  Your BP is the force of your blood moving against the walls of your arteries  Normal BP is less than 120/80  Prehypertension is between 120/80 and 139/89  Hypertension is 140/90 or higher  Hypertension causes your BP to get so high that your heart has to work much harder than normal  This can damage your heart  You can control hypertension with a healthy lifestyle or medicines  A controlled blood pressure helps protect your organs, such as your heart, lungs, brain, and kidneys  Common symptoms include the following:   · Headache     · Blurred vision     · Chest pain     · Dizziness or weakness     · Trouble breathing    · Nosebleeds  Call 911 for any of the following:   · You have discomfort in your chest that feels like squeezing, pressure, fullness, or pain  · You become confused or have difficulty speaking  · You suddenly feel lightheaded or have trouble breathing  · You have pain or discomfort in your back, neck, jaw, stomach, or arm  Seek care immediately if:   · You have a severe headache or vision loss  · You have weakness in an arm or leg  Contact your healthcare provider if:   · You feel faint, dizzy, confused, or drowsy  · You have been taking your BP medicine and your BP is still higher than your healthcare provider says it should be  · You have questions or concerns about your condition or care  Treatment for hypertension  may include medicine to lower your BP and lower your cholesterol level  A low cholesterol level helps prevent heart disease and makes it easier to control your blood pressure  You may also need to make lifestyle changes  Take your medicine exactly as directed  Manage hypertension:  Talk with your healthcare provider about these and other ways to manage hypertension:  · Check your BP at home  Sit and rest for 5 minutes before you take your BP   Extend your arm and support it on a flat surface  Your arm should be at the same level as your heart  Follow the directions that came with your BP monitor  If possible, take at least 2 BP readings each time  Take your BP at least twice a day at the same times each day, such as morning and evening  Keep a record of your BP readings and bring it to your follow-up visits  Ask your healthcare provider what your BP should be  · Limit sodium (salt) as directed  Too much sodium can affect your fluid balance  Check labels to find low-sodium or no-salt-added foods  Some low-sodium foods use potassium salts for flavor  Too much potassium can also cause health problems  Your healthcare provider will tell you how much sodium and potassium are safe for you to have in a day  He or she may recommend that you limit sodium to 2,300 mg a day  · Follow the meal plan recommended by your healthcare provider  A dietitian or your provider can give you more information on low-sodium plans or the DASH (Dietary Approaches to Stop Hypertension) eating plan  The DASH plan is low in sodium, unhealthy fats, and total fat  It is high in potassium, calcium, and fiber  · Exercise to maintain a healthy weight  Exercise at least 30 minutes per day, on most days of the week  This will help decrease your blood pressure  Ask your healthcare provider about the best exercise plan for you  · Decrease stress  This may help lower your BP  Learn ways to relax, such as deep breathing or listening to music  · Limit alcohol  Women should limit alcohol to 1 drink a day  Men should limit alcohol to 2 drinks a day  A drink of alcohol is 12 ounces of beer, 5 ounces of wine, or 1½ ounces of liquor  · Do not smoke  Nicotine and other chemicals in cigarettes and cigars can increase your BP and also cause lung damage  Ask your healthcare provider for information if you currently smoke and need help to quit  E-cigarettes or smokeless tobacco still contain nicotine  Talk to your healthcare provider before you use these products  · Manage any other health conditions you have  Health conditions such as diabetes can increase your risk for hypertension  Follow your healthcare provider's instructions and take all your medicines as directed  Follow up with your healthcare provider as directed: You will need to return to have your BP checked and to have other lab tests done  Write down your questions so you remember to ask them during your visits  © 2017 [address] Information is for End User's use only and may not be sold, redistributed or otherwise used for commercial purposes  All illustrations and images included in CareNotes® are the copyrighted property of A D A M [employer] Inc  or Wiliam Poole  The above information is an  only  It is not intended as medical advice for individual conditions or treatments  Talk to your doctor, nurse or pharmacist before following any medical regimen to see if it is safe and effective for you

## 2018-04-02 NOTE — PROGRESS NOTES
Assessment/Plan:    No problem-specific Assessment & Plan notes found for this encounter  Diagnoses and all orders for this visit:    Essential hypertension  -     CBC and differential; Future  -     Comprehensive metabolic panel; Future  -     Microalbumin / creatinine urine ratio; Future    Age-related osteoporosis without current pathological fracture  -     Vitamin D 1,25 dihydroxy; Future    CKD (chronic kidney disease), stage III    Abnormal thyroid blood test  -     TSH, 3rd generation; Future    Chronic gout without tophus, unspecified cause, unspecified site    Mixed hyperlipidemia  -     LDL cholesterol, direct; Future  -     Triglycerides; Future    Polymyalgia rheumatica (HCC)    Intermittent claudication (HCC)    Memory difficulties  -     donepezil (ARICEPT) 5 mg tablet; Take 1 tablet (5 mg total) by mouth daily at bedtime    Screen for colon cancer  -     Occult Bloood,Fecal Immunochemical; Future    Other orders  -     alendronate (FOSAMAX) 70 mg tablet; Take 1 tablet by mouth once a week  -     allopurinol (ZYLOPRIM) 100 mg tablet; Take 1 tablet by mouth daily  -     atenolol (TENORMIN) 25 mg tablet; Take 1 tablet by mouth daily  -     Discontinue: benzonatate (TESSALON PERLES) 100 mg capsule; Take 100 mg by mouth  -     Discontinue: Calcium Carb-Cholecalciferol (CALCIUM + D3) 600-200 MG-UNIT TABS; Take by mouth  -     Discontinue: citalopram (CeleXA) 20 mg tablet; Take by mouth  -     clopidogrel (PLAVIX) 75 mg tablet; Take 1 tablet by mouth daily  -     hydrochlorothiazide (HYDRODIURIL) 50 mg tablet; Take 1 tablet by mouth daily  -     Discontinue: potassium chloride (K-DUR,KLOR-CON) 20 mEq tablet; Take by mouth  -     lisinopril (ZESTRIL) 40 mg tablet; Take 1 tablet by mouth daily  -     Discontinue: omega-3-acid ethyl esters (LOVAZA) 1 g capsule;  Take by mouth  -     Multiple Vitamins-Minerals (MULTI ADULT GUMMIES) CHEW; Chew 1 tablet daily  -     Discontinue: Multiple Vitamins-Minerals (MULTIVITAMIN ADULT) TABS; Take by mouth  -     Discontinue: Omega-3 1000 MG CAPS; Take by mouth  -     pentoxifylline (TRENtal) 400 mg ER tablet; Take by mouth  -     simvastatin (ZOCOR) 40 mg tablet; Take 1 tablet by mouth daily     A/P: Doing ok  Suspect probable dementia, multi infarct and possibly SDAT  Will start Aricept  BP is up, but reports OP readings are good  Will check at home an bring in log in several weeks  Discussed PT eval for the walking, but defers  Will check the labs and order a FIT test  Continue current treatment otherwise pending labs  RTC four weeks for f/u dementia, BP, and labs  Subjective:      Patient ID: Eunice Doll is a 66 y o  female  WF RTC with her relative for f/u htn, PAD, etc  Since last visit, appears to have had another TIA  Doing ok, but reports worsening memory, mainly short term  No longer driving  Remains active with some balance issues, but no falls  Due for labs and colon cancer screen  The following portions of the patient's history were reviewed and updated as appropriate:   She  has no past medical history on file  She   Patient Active Problem List    Diagnosis Date Noted    Polymyalgia rheumatica ([facility] ) 03/30/2018    Abnormal mammogram 03/31/2017    Abnormal thyroid blood test 03/31/2017    CKD (chronic kidney disease), stage III 03/31/2017    TIA (transient ischemic attack) 03/31/2017    Hypertension 11/10/2016    Mixed hyperlipidemia 11/10/2016    Peripheral vascular disease ([facility] ) 11/10/2016    Intermittent claudication ([place] [address] ) 09/23/2015    Cerebrovascular disease, arteriosclerotic, post-stroke 04/14/2009    Gout 02/05/2007    Osteoporosis 04/08/2003     She  has a past surgical history that includes Appendectomy; Hysterectomy; Tubal ligation; and Uterine fibroid surgery  Her family history includes Hypertension in her father; Other in her father; Stroke in her mother  She  reports that she has quit smoking   She has never used smokeless tobacco  She reports that she does not drink alcohol or use drugs  Current Outpatient Prescriptions   Medication Sig Dispense Refill    alendronate (FOSAMAX) 70 mg tablet Take 1 tablet by mouth once a week      atenolol (TENORMIN) 25 mg tablet Take 1 tablet by mouth daily      clopidogrel (PLAVIX) 75 mg tablet Take 1 tablet by mouth daily      hydrochlorothiazide (HYDRODIURIL) 50 mg tablet Take 1 tablet by mouth daily      lisinopril (ZESTRIL) 40 mg tablet Take 1 tablet by mouth daily      Multiple Vitamins-Minerals (MULTI ADULT GUMMIES) CHEW Chew 1 tablet daily      pentoxifylline (TRENtal) 400 mg ER tablet Take by mouth      simvastatin (ZOCOR) 40 mg tablet Take 1 tablet by mouth daily      allopurinol (ZYLOPRIM) 100 mg tablet Take 1 tablet by mouth daily      donepezil (ARICEPT) 5 mg tablet Take 1 tablet (5 mg total) by mouth daily at bedtime 90 tablet 0     No current facility-administered medications for this visit  No current outpatient prescriptions on file prior to visit  No current facility-administered medications on file prior to visit  She is allergic to oxycodone-acetaminophen       Review of Systems   Constitutional: Negative for activity change, chills, diaphoresis, fatigue and fever  HENT: Negative  Eyes: Negative for visual disturbance  Respiratory: Negative for cough, chest tightness, shortness of breath and wheezing  Cardiovascular: Negative for chest pain, palpitations and leg swelling  Gastrointestinal: Negative for abdominal pain, constipation, diarrhea, nausea and vomiting  Endocrine: Negative for cold intolerance and heat intolerance  Genitourinary: Negative for difficulty urinating, dysuria and frequency  Musculoskeletal: Negative for arthralgias, gait problem and myalgias  Neurological: Negative for dizziness, light-headedness and headaches  Memory issues  Hematological: Negative for adenopathy     Psychiatric/Behavioral: Negative for confusion, dysphoric mood and sleep disturbance  The patient is not nervous/anxious  Objective:      /78   Pulse 72   Temp 99 6 °F (37 6 °C) (Tympanic)   Resp 18   Ht 5' 4" (1 626 m)   Wt 72 1 kg (159 lb)   BMI 27 29 kg/m²          Physical Exam   Constitutional: She is oriented to person, place, and time  She appears well-developed and well-nourished  No distress  HENT:   Head: Normocephalic and atraumatic  Mouth/Throat: Oropharynx is clear and moist    Eyes: Conjunctivae and EOM are normal  Pupils are equal, round, and reactive to light  Neck: Neck supple  No JVD present  Cardiovascular: Normal rate, regular rhythm and normal heart sounds  Pulmonary/Chest: Effort normal and breath sounds normal  No respiratory distress  She has no wheezes  Abdominal: Soft  Bowel sounds are normal  There is no tenderness  Musculoskeletal: She exhibits no edema  Neurological: She is alert and oriented to person, place, and time  No cranial nerve deficit  Psychiatric: She has a normal mood and affect  Her behavior is normal  Judgment and thought content normal    Nursing note and vitals reviewed

## 2018-04-18 ENCOUNTER — APPOINTMENT (OUTPATIENT)
Dept: LAB | Facility: CLINIC | Age: 78
End: 2018-04-18
Payer: COMMERCIAL

## 2018-04-18 ENCOUNTER — TRANSCRIBE ORDERS (OUTPATIENT)
Dept: LAB | Facility: CLINIC | Age: 78
End: 2018-04-18

## 2018-04-18 DIAGNOSIS — E78.2 MIXED HYPERLIPIDEMIA: ICD-10-CM

## 2018-04-18 DIAGNOSIS — M81.0 AGE-RELATED OSTEOPOROSIS WITHOUT CURRENT PATHOLOGICAL FRACTURE: ICD-10-CM

## 2018-04-18 DIAGNOSIS — R79.89 ABNORMAL THYROID BLOOD TEST: ICD-10-CM

## 2018-04-18 DIAGNOSIS — I10 ESSENTIAL HYPERTENSION: ICD-10-CM

## 2018-04-18 DIAGNOSIS — Z12.11 SCREEN FOR COLON CANCER: ICD-10-CM

## 2018-04-18 LAB
ALBUMIN SERPL BCP-MCNC: 2.9 G/DL (ref 3.5–5)
ALP SERPL-CCNC: 118 U/L (ref 46–116)
ALT SERPL W P-5'-P-CCNC: 32 U/L (ref 12–78)
ANION GAP SERPL CALCULATED.3IONS-SCNC: 9 MMOL/L (ref 4–13)
AST SERPL W P-5'-P-CCNC: 28 U/L (ref 5–45)
BASOPHILS # BLD AUTO: 0.04 THOUSANDS/ΜL (ref 0–0.1)
BASOPHILS NFR BLD AUTO: 0 % (ref 0–1)
BILIRUB SERPL-MCNC: 0.37 MG/DL (ref 0.2–1)
BUN SERPL-MCNC: 42 MG/DL (ref 5–25)
CALCIUM SERPL-MCNC: 9 MG/DL (ref 8.3–10.1)
CHLORIDE SERPL-SCNC: 105 MMOL/L (ref 100–108)
CO2 SERPL-SCNC: 25 MMOL/L (ref 21–32)
CREAT SERPL-MCNC: 1.41 MG/DL (ref 0.6–1.3)
EOSINOPHIL # BLD AUTO: 0.94 THOUSAND/ΜL (ref 0–0.61)
EOSINOPHIL NFR BLD AUTO: 9 % (ref 0–6)
ERYTHROCYTE [DISTWIDTH] IN BLOOD BY AUTOMATED COUNT: 13.6 % (ref 11.6–15.1)
GFR SERPL CREATININE-BSD FRML MDRD: 36 ML/MIN/1.73SQ M
GLUCOSE P FAST SERPL-MCNC: 90 MG/DL (ref 65–99)
HCT VFR BLD AUTO: 34.6 % (ref 34.8–46.1)
HEMOCCULT STL QL IA: NEGATIVE
HGB BLD-MCNC: 10.5 G/DL (ref 11.5–15.4)
LDLC SERPL DIRECT ASSAY-MCNC: 88 MG/DL (ref 0–100)
LYMPHOCYTES # BLD AUTO: 2.49 THOUSANDS/ΜL (ref 0.6–4.47)
LYMPHOCYTES NFR BLD AUTO: 23 % (ref 14–44)
MCH RBC QN AUTO: 28.5 PG (ref 26.8–34.3)
MCHC RBC AUTO-ENTMCNC: 30.3 G/DL (ref 31.4–37.4)
MCV RBC AUTO: 94 FL (ref 82–98)
MONOCYTES # BLD AUTO: 0.72 THOUSAND/ΜL (ref 0.17–1.22)
MONOCYTES NFR BLD AUTO: 7 % (ref 4–12)
NEUTROPHILS # BLD AUTO: 6.5 THOUSANDS/ΜL (ref 1.85–7.62)
NEUTS SEG NFR BLD AUTO: 61 % (ref 43–75)
NRBC BLD AUTO-RTO: 0 /100 WBCS
PLATELET # BLD AUTO: 475 THOUSANDS/UL (ref 149–390)
PMV BLD AUTO: 10.3 FL (ref 8.9–12.7)
POTASSIUM SERPL-SCNC: 4.1 MMOL/L (ref 3.5–5.3)
PROT SERPL-MCNC: 8.3 G/DL (ref 6.4–8.2)
RBC # BLD AUTO: 3.69 MILLION/UL (ref 3.81–5.12)
SODIUM SERPL-SCNC: 139 MMOL/L (ref 136–145)
TRIGL SERPL-MCNC: 112 MG/DL
TSH SERPL DL<=0.05 MIU/L-ACNC: 1.35 UIU/ML (ref 0.36–3.74)
WBC # BLD AUTO: 10.74 THOUSAND/UL (ref 4.31–10.16)

## 2018-04-18 PROCEDURE — 84478 ASSAY OF TRIGLYCERIDES: CPT

## 2018-04-18 PROCEDURE — 82570 ASSAY OF URINE CREATININE: CPT

## 2018-04-18 PROCEDURE — G0328 FECAL BLOOD SCRN IMMUNOASSAY: HCPCS

## 2018-04-18 PROCEDURE — 36415 COLL VENOUS BLD VENIPUNCTURE: CPT

## 2018-04-18 PROCEDURE — 82043 UR ALBUMIN QUANTITATIVE: CPT

## 2018-04-18 PROCEDURE — 83721 ASSAY OF BLOOD LIPOPROTEIN: CPT

## 2018-04-18 PROCEDURE — 82652 VIT D 1 25-DIHYDROXY: CPT

## 2018-04-18 PROCEDURE — 85025 COMPLETE CBC W/AUTO DIFF WBC: CPT

## 2018-04-18 PROCEDURE — 80053 COMPREHEN METABOLIC PANEL: CPT

## 2018-04-18 PROCEDURE — 84443 ASSAY THYROID STIM HORMONE: CPT

## 2018-04-19 LAB
CREAT UR-MCNC: 156 MG/DL
MICROALBUMIN UR-MCNC: 12.1 MG/L (ref 0–20)
MICROALBUMIN/CREAT 24H UR: 8 MG/G CREATININE (ref 0–30)

## 2018-04-20 LAB — 1,25(OH)2D3 SERPL-MCNC: 44.3 PG/ML (ref 19.9–79.3)

## 2018-04-30 ENCOUNTER — OFFICE VISIT (OUTPATIENT)
Dept: INTERNAL MEDICINE CLINIC | Facility: CLINIC | Age: 78
End: 2018-04-30
Payer: COMMERCIAL

## 2018-04-30 VITALS
BODY MASS INDEX: 26.98 KG/M2 | TEMPERATURE: 98.2 F | DIASTOLIC BLOOD PRESSURE: 68 MMHG | RESPIRATION RATE: 16 BRPM | WEIGHT: 158 LBS | HEART RATE: 74 BPM | SYSTOLIC BLOOD PRESSURE: 160 MMHG | HEIGHT: 64 IN

## 2018-04-30 DIAGNOSIS — M1A.9XX0 CHRONIC GOUT WITHOUT TOPHUS, UNSPECIFIED CAUSE, UNSPECIFIED SITE: ICD-10-CM

## 2018-04-30 DIAGNOSIS — E44.0 MODERATE PROTEIN-CALORIE MALNUTRITION (HCC): ICD-10-CM

## 2018-04-30 DIAGNOSIS — I10 ESSENTIAL HYPERTENSION: Primary | ICD-10-CM

## 2018-04-30 DIAGNOSIS — N18.30 CKD (CHRONIC KIDNEY DISEASE), STAGE III (HCC): ICD-10-CM

## 2018-04-30 DIAGNOSIS — M81.0 AGE-RELATED OSTEOPOROSIS WITHOUT CURRENT PATHOLOGICAL FRACTURE: ICD-10-CM

## 2018-04-30 DIAGNOSIS — R41.3 MEMORY DIFFICULTIES: ICD-10-CM

## 2018-04-30 PROCEDURE — 99214 OFFICE O/P EST MOD 30 MIN: CPT | Performed by: INTERNAL MEDICINE

## 2018-04-30 RX ORDER — ALENDRONATE SODIUM 70 MG/1
70 TABLET ORAL WEEKLY
Qty: 90 TABLET | Refills: 3 | Status: SHIPPED | OUTPATIENT
Start: 2018-04-30 | End: 2018-05-21 | Stop reason: SDUPTHER

## 2018-04-30 NOTE — PATIENT INSTRUCTIONS
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
## 2018-05-21 DIAGNOSIS — I63.9 CEREBROVASCULAR ACCIDENT (CVA), UNSPECIFIED MECHANISM (HCC): ICD-10-CM

## 2018-05-21 DIAGNOSIS — R41.3 MEMORY DIFFICULTIES: ICD-10-CM

## 2018-05-21 DIAGNOSIS — E78.2 MIXED HYPERLIPIDEMIA: ICD-10-CM

## 2018-05-21 DIAGNOSIS — M1A.9XX0 CHRONIC GOUT WITHOUT TOPHUS, UNSPECIFIED CAUSE, UNSPECIFIED SITE: ICD-10-CM

## 2018-05-21 DIAGNOSIS — M81.0 AGE-RELATED OSTEOPOROSIS WITHOUT CURRENT PATHOLOGICAL FRACTURE: ICD-10-CM

## 2018-05-21 DIAGNOSIS — I10 ESSENTIAL HYPERTENSION: ICD-10-CM

## 2018-05-21 RX ORDER — DONEPEZIL HYDROCHLORIDE 5 MG/1
5 TABLET, FILM COATED ORAL
Qty: 90 TABLET | Refills: 3 | Status: SHIPPED | OUTPATIENT
Start: 2018-05-21 | End: 2019-03-04 | Stop reason: SDUPTHER

## 2018-05-21 RX ORDER — CLOPIDOGREL BISULFATE 75 MG/1
75 TABLET ORAL DAILY
Qty: 90 TABLET | Refills: 3 | Status: SHIPPED | OUTPATIENT
Start: 2018-05-21 | End: 2018-11-21 | Stop reason: SDUPTHER

## 2018-05-21 RX ORDER — ALLOPURINOL 100 MG/1
100 TABLET ORAL DAILY
Qty: 90 TABLET | Refills: 3 | Status: SHIPPED | OUTPATIENT
Start: 2018-05-21 | End: 2018-11-21 | Stop reason: SDUPTHER

## 2018-05-21 RX ORDER — SIMVASTATIN 40 MG
40 TABLET ORAL DAILY
Qty: 90 TABLET | Refills: 3 | Status: SHIPPED | OUTPATIENT
Start: 2018-05-21 | End: 2019-03-29

## 2018-05-21 RX ORDER — HYDROCHLOROTHIAZIDE 50 MG/1
50 TABLET ORAL DAILY
Qty: 90 TABLET | Refills: 3 | Status: SHIPPED | OUTPATIENT
Start: 2018-05-21 | End: 2018-11-21 | Stop reason: SDUPTHER

## 2018-05-21 RX ORDER — ALENDRONATE SODIUM 70 MG/1
70 TABLET ORAL WEEKLY
Qty: 90 TABLET | Refills: 3 | Status: SHIPPED | OUTPATIENT
Start: 2018-05-21 | End: 2018-11-21 | Stop reason: SDUPTHER

## 2018-05-21 RX ORDER — PENTOXIFYLLINE 400 MG/1
400 TABLET, EXTENDED RELEASE ORAL
Qty: 90 TABLET | Refills: 3 | Status: SHIPPED | OUTPATIENT
Start: 2018-05-21 | End: 2019-02-18 | Stop reason: SDUPTHER

## 2018-05-21 RX ORDER — ATENOLOL 25 MG/1
25 TABLET ORAL DAILY
Qty: 90 TABLET | Refills: 3 | Status: SHIPPED | OUTPATIENT
Start: 2018-05-21 | End: 2018-11-21 | Stop reason: SDUPTHER

## 2018-05-21 RX ORDER — LISINOPRIL 40 MG/1
40 TABLET ORAL DAILY
Qty: 90 TABLET | Refills: 3 | Status: SHIPPED | OUTPATIENT
Start: 2018-05-21 | End: 2018-11-21 | Stop reason: SDUPTHER

## 2018-08-29 ENCOUNTER — OFFICE VISIT (OUTPATIENT)
Dept: INTERNAL MEDICINE CLINIC | Facility: CLINIC | Age: 78
End: 2018-08-29
Payer: COMMERCIAL

## 2018-08-29 VITALS
BODY MASS INDEX: 26.98 KG/M2 | SYSTOLIC BLOOD PRESSURE: 128 MMHG | WEIGHT: 158 LBS | HEIGHT: 64 IN | OXYGEN SATURATION: 95 % | TEMPERATURE: 98.8 F | HEART RATE: 74 BPM | DIASTOLIC BLOOD PRESSURE: 60 MMHG | RESPIRATION RATE: 18 BRPM

## 2018-08-29 DIAGNOSIS — I10 ESSENTIAL HYPERTENSION: Primary | ICD-10-CM

## 2018-08-29 DIAGNOSIS — N18.30 CKD (CHRONIC KIDNEY DISEASE), STAGE III (HCC): ICD-10-CM

## 2018-08-29 DIAGNOSIS — I73.9 PERIPHERAL VASCULAR DISEASE (HCC): ICD-10-CM

## 2018-08-29 DIAGNOSIS — M35.3 POLYMYALGIA RHEUMATICA (HCC): ICD-10-CM

## 2018-08-29 DIAGNOSIS — E78.2 MIXED HYPERLIPIDEMIA: ICD-10-CM

## 2018-08-29 DIAGNOSIS — M1A.9XX0 CHRONIC GOUT WITHOUT TOPHUS, UNSPECIFIED CAUSE, UNSPECIFIED SITE: ICD-10-CM

## 2018-08-29 DIAGNOSIS — M81.0 AGE-RELATED OSTEOPOROSIS WITHOUT CURRENT PATHOLOGICAL FRACTURE: ICD-10-CM

## 2018-08-29 PROBLEM — R79.89 ABNORMAL THYROID BLOOD TEST: Status: RESOLVED | Noted: 2017-03-31 | Resolved: 2018-08-29

## 2018-08-29 PROBLEM — R92.8 ABNORMAL MAMMOGRAM: Status: RESOLVED | Noted: 2017-03-31 | Resolved: 2018-08-29

## 2018-08-29 PROCEDURE — 1036F TOBACCO NON-USER: CPT | Performed by: INTERNAL MEDICINE

## 2018-08-29 PROCEDURE — 99214 OFFICE O/P EST MOD 30 MIN: CPT | Performed by: INTERNAL MEDICINE

## 2018-08-29 PROCEDURE — 3008F BODY MASS INDEX DOCD: CPT | Performed by: INTERNAL MEDICINE

## 2018-08-29 PROCEDURE — 4040F PNEUMOC VAC/ADMIN/RCVD: CPT | Performed by: INTERNAL MEDICINE

## 2018-08-29 PROCEDURE — 3078F DIAST BP <80 MM HG: CPT | Performed by: INTERNAL MEDICINE

## 2018-08-29 PROCEDURE — 3074F SYST BP LT 130 MM HG: CPT | Performed by: INTERNAL MEDICINE

## 2018-08-29 PROCEDURE — 1160F RVW MEDS BY RX/DR IN RCRD: CPT | Performed by: INTERNAL MEDICINE

## 2018-08-29 NOTE — PATIENT INSTRUCTIONS

## 2018-08-29 NOTE — PROGRESS NOTES
Assessment/Plan:    No problem-specific Assessment & Plan notes found for this encounter  Diagnoses and all orders for this visit:    Essential hypertension    Peripheral vascular disease ([facility] [address] )    Age-related osteoporosis without current pathological fracture    CKD (chronic kidney disease), stage III    Chronic gout without tophus, unspecified cause, unspecified site    Mixed hyperlipidemia    Polymyalgia rheumatica (HCC)      A/P: Doing well and no c/o's  Labs up to date  Recommend flu vaccine  Continue current treatment and RTC six months for routine  Subjective:      Patient ID: Wil Su is a 66 y o  female  WF RTC with her relative for f/u htn, Gout, etc  Doing well and no c/o's  Remains active w/o difficulty and no falls, but has a large dog and tends to jump up on her  Not out of the house often due to transportation issues  No recent gouty attacks and PMR is good  No stroke-like events  Labs up to date  The following portions of the patient's history were reviewed and updated as appropriate:   She  has a past medical history of Chronic kidney disease; Dementia; Gout; Hypertension; and Osteoporosis  She   Patient Active Problem List    Diagnosis Date Noted    [facility] 03/30/2018    CKD (chronic kidney disease), stage III 03/31/2017    TIA (transient ischemic attack) 03/31/2017    Hypertension 11/10/2016    Mixed hyperlipidemia 11/10/2016    [facility] ) 11/10/2016    Intermittent claudication ([facility] ) 09/23/2015    Cerebrovascular disease, arteriosclerotic, post-stroke 04/14/2009    Gout 02/05/2007    Osteoporosis 04/08/2003     She  has a past surgical history that includes Appendectomy; Hysterectomy; Tubal ligation; and Uterine fibroid surgery  Her family history includes Hypertension in her father; Other in her father; Stroke in her mother  She  reports that she has quit smoking   She has never used smokeless tobacco  She reports that she does not drink alcohol or use drugs  Current Outpatient Prescriptions   Medication Sig Dispense Refill    alendronate (FOSAMAX) 70 mg tablet Take 1 tablet (70 mg total) by mouth once a week 90 tablet 3    allopurinol (ZYLOPRIM) 100 mg tablet Take 1 tablet (100 mg total) by mouth daily 90 tablet 3    atenolol (TENORMIN) 25 mg tablet Take 1 tablet (25 mg total) by mouth daily 90 tablet 3    clopidogrel (PLAVIX) 75 mg tablet Take 1 tablet (75 mg total) by mouth daily 90 tablet 3    donepezil (ARICEPT) 5 mg tablet Take 1 tablet (5 mg total) by mouth daily at bedtime 90 tablet 3    hydrochlorothiazide (HYDRODIURIL) 50 mg tablet Take 1 tablet (50 mg total) by mouth daily 90 tablet 3    lisinopril (ZESTRIL) 40 mg tablet Take 1 tablet (40 mg total) by mouth daily 90 tablet 3    Multiple Vitamins-Minerals (MULTI ADULT GUMMIES) CHEW Chew 1 tablet daily      pentoxifylline (TRENtal) 400 mg ER tablet Take 1 tablet (400 mg total) by mouth 3 (three) times a day with meals 90 tablet 3    simvastatin (ZOCOR) 40 mg tablet Take 1 tablet (40 mg total) by mouth daily 90 tablet 3     No current facility-administered medications for this visit        Current Outpatient Prescriptions on File Prior to Visit   Medication Sig    alendronate (FOSAMAX) 70 mg tablet Take 1 tablet (70 mg total) by mouth once a week    allopurinol (ZYLOPRIM) 100 mg tablet Take 1 tablet (100 mg total) by mouth daily    atenolol (TENORMIN) 25 mg tablet Take 1 tablet (25 mg total) by mouth daily    clopidogrel (PLAVIX) 75 mg tablet Take 1 tablet (75 mg total) by mouth daily    donepezil (ARICEPT) 5 mg tablet Take 1 tablet (5 mg total) by mouth daily at bedtime    hydrochlorothiazide (HYDRODIURIL) 50 mg tablet Take 1 tablet (50 mg total) by mouth daily    lisinopril (ZESTRIL) 40 mg tablet Take 1 tablet (40 mg total) by mouth daily    Multiple Vitamins-Minerals (MULTI ADULT GUMMIES) CHEW Chew 1 tablet daily    pentoxifylline (TRENtal) 400 mg ER tablet Take 1 tablet (400 mg total) by mouth 3 (three) times a day with meals    simvastatin (ZOCOR) 40 mg tablet Take 1 tablet (40 mg total) by mouth daily     No current facility-administered medications on file prior to visit  She is allergic to oxycodone-acetaminophen       Review of Systems   Constitutional: Negative for activity change, chills, diaphoresis, fatigue and fever  HENT: Negative  Eyes: Negative for visual disturbance  Respiratory: Negative for cough, chest tightness, shortness of breath and wheezing  Cardiovascular: Negative for chest pain, palpitations and leg swelling  Gastrointestinal: Negative for abdominal pain, constipation, diarrhea, nausea and vomiting  Endocrine: Negative for cold intolerance and heat intolerance  Genitourinary: Negative for difficulty urinating, dysuria and frequency  Musculoskeletal: Negative for arthralgias, gait problem and myalgias  Neurological: Negative for dizziness, seizures, syncope, weakness, light-headedness and headaches  Psychiatric/Behavioral: Negative for confusion, dysphoric mood and sleep disturbance  The patient is not nervous/anxious  Objective:      /60 (BP Location: Left arm, Patient Position: Sitting, Cuff Size: Adult)   Pulse 74   Temp 98 8 °F (37 1 °C) (Tympanic)   Resp 18   Ht 5' 4" (1 626 m)   Wt 71 7 kg (158 lb)   SpO2 95%   BMI 27 12 kg/m²          Physical Exam   Constitutional: She is oriented to person, place, and time  She appears well-developed and well-nourished  No distress  HENT:   Head: Normocephalic and atraumatic  Mouth/Throat: Oropharynx is clear and moist    Eyes: Conjunctivae and EOM are normal  Pupils are equal, round, and reactive to light  Neck: Neck supple  No JVD present  Cardiovascular: Normal rate, regular rhythm and normal heart sounds  No murmur heard  Pulmonary/Chest: Effort normal and breath sounds normal  No respiratory distress  She has no wheezes  Abdominal: Soft  Bowel sounds are normal  She exhibits no distension  There is no tenderness  Musculoskeletal: She exhibits no edema  Neurological: She is alert and oriented to person, place, and time  No cranial nerve deficit  Coordination normal    Psychiatric: She has a normal mood and affect  Her behavior is normal  Judgment and thought content normal    Nursing note and vitals reviewed

## 2018-11-21 DIAGNOSIS — M1A.9XX0 CHRONIC GOUT WITHOUT TOPHUS, UNSPECIFIED CAUSE, UNSPECIFIED SITE: ICD-10-CM

## 2018-11-21 DIAGNOSIS — M81.0 AGE-RELATED OSTEOPOROSIS WITHOUT CURRENT PATHOLOGICAL FRACTURE: ICD-10-CM

## 2018-11-21 DIAGNOSIS — I10 ESSENTIAL HYPERTENSION: ICD-10-CM

## 2018-11-21 DIAGNOSIS — I63.9 CEREBROVASCULAR ACCIDENT (CVA), UNSPECIFIED MECHANISM (HCC): ICD-10-CM

## 2018-11-21 RX ORDER — ALENDRONATE SODIUM 70 MG/1
70 TABLET ORAL WEEKLY
Qty: 90 TABLET | Refills: 0 | Status: SHIPPED | OUTPATIENT
Start: 2018-11-21 | End: 2019-02-18 | Stop reason: SDUPTHER

## 2018-11-21 RX ORDER — HYDROCHLOROTHIAZIDE 50 MG/1
50 TABLET ORAL DAILY
Qty: 90 TABLET | Refills: 0 | Status: SHIPPED | OUTPATIENT
Start: 2018-11-21 | End: 2019-02-18 | Stop reason: SDUPTHER

## 2018-11-21 RX ORDER — ALLOPURINOL 100 MG/1
100 TABLET ORAL DAILY
Qty: 90 TABLET | Refills: 0 | Status: SHIPPED | OUTPATIENT
Start: 2018-11-21 | End: 2019-02-18 | Stop reason: SDUPTHER

## 2018-11-21 RX ORDER — LISINOPRIL 40 MG/1
40 TABLET ORAL DAILY
Qty: 90 TABLET | Refills: 0 | Status: SHIPPED | OUTPATIENT
Start: 2018-11-21 | End: 2019-02-18 | Stop reason: SDUPTHER

## 2018-11-21 RX ORDER — ATENOLOL 25 MG/1
25 TABLET ORAL DAILY
Qty: 90 TABLET | Refills: 0 | Status: SHIPPED | OUTPATIENT
Start: 2018-11-21 | End: 2019-02-18 | Stop reason: SDUPTHER

## 2018-11-21 RX ORDER — CLOPIDOGREL BISULFATE 75 MG/1
75 TABLET ORAL DAILY
Qty: 90 TABLET | Refills: 0 | Status: SHIPPED | OUTPATIENT
Start: 2018-11-21 | End: 2019-02-18 | Stop reason: SDUPTHER

## 2019-02-18 DIAGNOSIS — M81.0 AGE-RELATED OSTEOPOROSIS WITHOUT CURRENT PATHOLOGICAL FRACTURE: ICD-10-CM

## 2019-02-18 DIAGNOSIS — I63.9 CEREBROVASCULAR ACCIDENT (CVA), UNSPECIFIED MECHANISM (HCC): ICD-10-CM

## 2019-02-18 DIAGNOSIS — M1A.9XX0 CHRONIC GOUT WITHOUT TOPHUS, UNSPECIFIED CAUSE, UNSPECIFIED SITE: ICD-10-CM

## 2019-02-18 DIAGNOSIS — I10 ESSENTIAL HYPERTENSION: ICD-10-CM

## 2019-02-18 RX ORDER — LISINOPRIL 40 MG/1
40 TABLET ORAL DAILY
Qty: 90 TABLET | Refills: 1 | Status: SHIPPED | OUTPATIENT
Start: 2019-02-18 | End: 2019-08-13 | Stop reason: SDUPTHER

## 2019-02-18 RX ORDER — PENTOXIFYLLINE 400 MG/1
400 TABLET, EXTENDED RELEASE ORAL
Qty: 90 TABLET | Refills: 1 | Status: SHIPPED | OUTPATIENT
Start: 2019-02-18 | End: 2019-08-13 | Stop reason: SDUPTHER

## 2019-02-18 RX ORDER — ATENOLOL 25 MG/1
25 TABLET ORAL DAILY
Qty: 90 TABLET | Refills: 1 | Status: SHIPPED | OUTPATIENT
Start: 2019-02-18 | End: 2019-08-13 | Stop reason: SDUPTHER

## 2019-02-18 RX ORDER — CLOPIDOGREL BISULFATE 75 MG/1
75 TABLET ORAL DAILY
Qty: 90 TABLET | Refills: 1 | Status: SHIPPED | OUTPATIENT
Start: 2019-02-18 | End: 2019-08-13 | Stop reason: SDUPTHER

## 2019-02-18 RX ORDER — ALLOPURINOL 100 MG/1
100 TABLET ORAL DAILY
Qty: 90 TABLET | Refills: 1 | Status: SHIPPED | OUTPATIENT
Start: 2019-02-18 | End: 2019-08-13 | Stop reason: SDUPTHER

## 2019-02-18 RX ORDER — HYDROCHLOROTHIAZIDE 50 MG/1
50 TABLET ORAL DAILY
Qty: 90 TABLET | Refills: 1 | Status: SHIPPED | OUTPATIENT
Start: 2019-02-18 | End: 2019-08-13 | Stop reason: SDUPTHER

## 2019-02-18 RX ORDER — ALENDRONATE SODIUM 70 MG/1
70 TABLET ORAL WEEKLY
Qty: 12 TABLET | Refills: 3 | Status: SHIPPED | OUTPATIENT
Start: 2019-02-18 | End: 2019-08-13 | Stop reason: SDUPTHER

## 2019-03-04 ENCOUNTER — OFFICE VISIT (OUTPATIENT)
Dept: INTERNAL MEDICINE CLINIC | Facility: CLINIC | Age: 79
End: 2019-03-04
Payer: COMMERCIAL

## 2019-03-04 VITALS
BODY MASS INDEX: 26.29 KG/M2 | DIASTOLIC BLOOD PRESSURE: 72 MMHG | WEIGHT: 154 LBS | HEIGHT: 64 IN | RESPIRATION RATE: 18 BRPM | TEMPERATURE: 98.7 F | HEART RATE: 80 BPM | SYSTOLIC BLOOD PRESSURE: 154 MMHG

## 2019-03-04 DIAGNOSIS — Z12.39 SCREENING FOR BREAST CANCER: ICD-10-CM

## 2019-03-04 DIAGNOSIS — E78.2 MIXED HYPERLIPIDEMIA: ICD-10-CM

## 2019-03-04 DIAGNOSIS — N18.30 CKD (CHRONIC KIDNEY DISEASE), STAGE III (HCC): ICD-10-CM

## 2019-03-04 DIAGNOSIS — I10 ESSENTIAL HYPERTENSION: Primary | ICD-10-CM

## 2019-03-04 DIAGNOSIS — I67.2 CEREBROVASCULAR DISEASE, ARTERIOSCLEROTIC, POST-STROKE: ICD-10-CM

## 2019-03-04 DIAGNOSIS — Z00.00 MEDICARE ANNUAL WELLNESS VISIT, SUBSEQUENT: ICD-10-CM

## 2019-03-04 DIAGNOSIS — E28.39 MENOPAUSE OVARIAN FAILURE: ICD-10-CM

## 2019-03-04 DIAGNOSIS — R41.3 MEMORY DIFFICULTIES: ICD-10-CM

## 2019-03-04 DIAGNOSIS — M1A.9XX0 CHRONIC GOUT WITHOUT TOPHUS, UNSPECIFIED CAUSE, UNSPECIFIED SITE: ICD-10-CM

## 2019-03-04 DIAGNOSIS — M81.0 AGE-RELATED OSTEOPOROSIS WITHOUT CURRENT PATHOLOGICAL FRACTURE: ICD-10-CM

## 2019-03-04 DIAGNOSIS — M35.3 POLYMYALGIA RHEUMATICA (HCC): ICD-10-CM

## 2019-03-04 DIAGNOSIS — E66.3 OVERWEIGHT (BMI 25.0-29.9): ICD-10-CM

## 2019-03-04 DIAGNOSIS — Z13.1 SCREENING FOR DIABETES MELLITUS (DM): ICD-10-CM

## 2019-03-04 DIAGNOSIS — Z13.29 SCREENING FOR THYROID DISORDER: ICD-10-CM

## 2019-03-04 DIAGNOSIS — Z13.0 SCREENING FOR DEFICIENCY ANEMIA: ICD-10-CM

## 2019-03-04 DIAGNOSIS — Z86.73 CEREBROVASCULAR DISEASE, ARTERIOSCLEROTIC, POST-STROKE: ICD-10-CM

## 2019-03-04 DIAGNOSIS — I73.9 INTERMITTENT CLAUDICATION (HCC): ICD-10-CM

## 2019-03-04 PROCEDURE — 1036F TOBACCO NON-USER: CPT | Performed by: INTERNAL MEDICINE

## 2019-03-04 PROCEDURE — 1125F AMNT PAIN NOTED PAIN PRSNT: CPT | Performed by: INTERNAL MEDICINE

## 2019-03-04 PROCEDURE — G0439 PPPS, SUBSEQ VISIT: HCPCS | Performed by: INTERNAL MEDICINE

## 2019-03-04 PROCEDURE — 1170F FXNL STATUS ASSESSED: CPT | Performed by: INTERNAL MEDICINE

## 2019-03-04 PROCEDURE — 99214 OFFICE O/P EST MOD 30 MIN: CPT | Performed by: INTERNAL MEDICINE

## 2019-03-04 PROCEDURE — 1160F RVW MEDS BY RX/DR IN RCRD: CPT | Performed by: INTERNAL MEDICINE

## 2019-03-04 RX ORDER — DONEPEZIL HYDROCHLORIDE 5 MG/1
5 TABLET, FILM COATED ORAL
Qty: 90 TABLET | Refills: 3 | Status: SHIPPED | OUTPATIENT
Start: 2019-03-04

## 2019-03-04 NOTE — PROGRESS NOTES
Assessment/Plan:    No problem-specific Assessment & Plan notes found for this encounter  Diagnoses and all orders for this visit:    Essential hypertension  -     Comprehensive metabolic panel; Future  -     CBC and differential; Future    Memory difficulties  -     donepezil (ARICEPT) 5 mg tablet; Take 1 tablet (5 mg total) by mouth daily at bedtime    Cerebrovascular disease, arteriosclerotic, post-stroke    Age-related osteoporosis without current pathological fracture    CKD (chronic kidney disease), stage III (HCC)    Chronic gout without tophus, unspecified cause, unspecified site    Intermittent claudication (HCC)    Mixed hyperlipidemia  -     Lipid Panel with Direct LDL reflex; Future  -     TSH, 3rd generation with Free T4 reflex; Future    Polymyalgia rheumatica (HCC)    Medicare annual wellness visit, subsequent    Screening for breast cancer  -     Mammo screening bilateral w 3d & cad; Future    Menopause ovarian failure  -     DXA bone density spine hip and pelvis; Future    Screening for diabetes mellitus (DM)  -     Hemoglobin A1C; Future    Screening for thyroid disorder  -     TSH, 3rd generation with Free T4 reflex; Future    Screening for deficiency anemia    Overweight (BMI 25 0-29  9)    Other orders  -     OMEGA-3 FATTY ACIDS-VITAMIN E PO; Take 1,000 mg by mouth      A/P: Doing well, but continues with intermittent TIA and claudication despite treatment  Wants to continue with current therapy and doesn't want any aggressive intervention  Will continue to modify her risks and will check labs  Order a mammo and dexa  Vaccines are up to date  Continue current treatment and RTC six months for a routine per her request  Discussed her BMI and she will receive information for diet and exercise  Subjective:      Patient ID: Christine Sanchez is a 78 y o  female  WF RTC for f/u htn, hyperlipidemia, etc  Doing well and no new issues  Remains active w/o difficulty and no falls   Continues with intermittent stroke like s/s  No gout attacks  PMR  Controlled  Still with claudication at times  Due for labs, mammo, and dexa  The following portions of the patient's history were reviewed and updated as appropriate:   She  has a past medical history of Chronic kidney disease, Dementia, Gout, Hypertension, and Osteoporosis  She   Patient Active Problem List    Diagnosis Date Noted    Polymyalgia rheumatica ([place] [address] ) 03/30/2018    CKD (chronic kidney disease), stage III ([place] [address] ) 03/31/2017    TIA (transient ischemic attack) 03/31/2017    Hypertension 11/10/2016    Mixed hyperlipidemia 11/10/2016    Peripheral vascular disease ([place] [address] ) 11/10/2016    Intermittent claudication ([place] [address] ) 09/23/2015    Cerebrovascular disease, arteriosclerotic, post-stroke 04/14/2009    Gout 02/05/2007    Osteoporosis 04/08/2003     She  has a past surgical history that includes Appendectomy; Hysterectomy; Tubal ligation; and Uterine fibroid surgery  Her family history includes Hypertension in her father; Other in her father; Stroke in her mother  She  reports that she has quit smoking  She has never used smokeless tobacco  She reports that she does not drink alcohol or use drugs    Current Outpatient Medications   Medication Sig Dispense Refill    alendronate (FOSAMAX) 70 mg tablet Take 1 tablet (70 mg total) by mouth once a week 12 tablet 3    allopurinol (ZYLOPRIM) 100 mg tablet Take 1 tablet (100 mg total) by mouth daily 90 tablet 1    atenolol (TENORMIN) 25 mg tablet Take 1 tablet (25 mg total) by mouth daily 90 tablet 1    clopidogrel (PLAVIX) 75 mg tablet Take 1 tablet (75 mg total) by mouth daily 90 tablet 1    donepezil (ARICEPT) 5 mg tablet Take 1 tablet (5 mg total) by mouth daily at bedtime 90 tablet 3    hydrochlorothiazide (HYDRODIURIL) 50 mg tablet Take 1 tablet (50 mg total) by mouth daily 90 tablet 1    lisinopril (ZESTRIL) 40 mg tablet Take 1 tablet (40 mg total) by mouth daily 90 tablet 1    Multiple Vitamins-Minerals (MULTI ADULT GUMMIES) CHEW Chew 1 tablet daily      OMEGA-3 FATTY ACIDS-VITAMIN E PO Take 1,000 mg by mouth      pentoxifylline (TRENtal) 400 mg ER tablet Take 1 tablet (400 mg total) by mouth 3 (three) times a day with meals 90 tablet 1    simvastatin (ZOCOR) 40 mg tablet Take 1 tablet (40 mg total) by mouth daily 90 tablet 3     No current facility-administered medications for this visit  Current Outpatient Medications on File Prior to Visit   Medication Sig    alendronate (FOSAMAX) 70 mg tablet Take 1 tablet (70 mg total) by mouth once a week    allopurinol (ZYLOPRIM) 100 mg tablet Take 1 tablet (100 mg total) by mouth daily    atenolol (TENORMIN) 25 mg tablet Take 1 tablet (25 mg total) by mouth daily    clopidogrel (PLAVIX) 75 mg tablet Take 1 tablet (75 mg total) by mouth daily    hydrochlorothiazide (HYDRODIURIL) 50 mg tablet Take 1 tablet (50 mg total) by mouth daily    lisinopril (ZESTRIL) 40 mg tablet Take 1 tablet (40 mg total) by mouth daily    Multiple Vitamins-Minerals (MULTI ADULT GUMMIES) CHEW Chew 1 tablet daily    OMEGA-3 FATTY ACIDS-VITAMIN E PO Take 1,000 mg by mouth    pentoxifylline (TRENtal) 400 mg ER tablet Take 1 tablet (400 mg total) by mouth 3 (three) times a day with meals    simvastatin (ZOCOR) 40 mg tablet Take 1 tablet (40 mg total) by mouth daily    [DISCONTINUED] donepezil (ARICEPT) 5 mg tablet Take 1 tablet (5 mg total) by mouth daily at bedtime     No current facility-administered medications on file prior to visit  She is allergic to oxycodone-acetaminophen       Review of Systems   Constitutional: Negative for activity change, chills, diaphoresis, fatigue and fever  HENT: Negative  Eyes: Negative for visual disturbance  Respiratory: Negative for cough, chest tightness, shortness of breath and wheezing  Cardiovascular: Negative for chest pain, palpitations and leg swelling     Gastrointestinal: Negative for abdominal pain, constipation, diarrhea, nausea and vomiting  Endocrine: Negative for cold intolerance and heat intolerance  Genitourinary: Negative for difficulty urinating, dysuria and frequency  Musculoskeletal: Negative for arthralgias, gait problem and myalgias  Neurological: Negative for dizziness, tremors, seizures, syncope, weakness, light-headedness and headaches  Psychiatric/Behavioral: Negative for confusion, dysphoric mood and sleep disturbance  The patient is not nervous/anxious  Objective:      /72   Pulse 80   Temp 98 7 °F (37 1 °C) (Tympanic)   Resp 18   Ht 5' 4" (1 626 m)   Wt 69 9 kg (154 lb)   BMI 26 43 kg/m²          Physical Exam   Constitutional: She is oriented to person, place, and time  She appears well-developed and well-nourished  No distress  HENT:   Head: Normocephalic and atraumatic  Mouth/Throat: Oropharynx is clear and moist    Eyes: Pupils are equal, round, and reactive to light  Conjunctivae and EOM are normal    Neck: Normal range of motion  Neck supple  No JVD present  Cardiovascular: Normal rate, regular rhythm and normal heart sounds  Pulmonary/Chest: Effort normal and breath sounds normal  No respiratory distress  She has no wheezes  She has no rales  Abdominal: Soft  Bowel sounds are normal  She exhibits no distension  There is no tenderness  Musculoskeletal: She exhibits no edema  Neurological: She is alert and oriented to person, place, and time  Psychiatric: She has a normal mood and affect  Her behavior is normal  Judgment and thought content normal    Nursing note and vitals reviewed  BMI Counseling: Body mass index is 26 43 kg/m²  Discussed the patient's BMI with her  The BMI is above average  BMI counseling and education was provided to the patient  Nutrition recommendations include reducing portion sizes, decreasing overall calorie intake, reducing intake of saturated fat and trans fat and reducing intake of cholesterol   Exercise recommendations include moderate aerobic physical activity for 150 minutes/week

## 2019-03-04 NOTE — PATIENT INSTRUCTIONS
Chronic Hypertension   AMBULATORY CARE:   Hypertension  is high blood pressure (BP)  Your BP is the force of your blood moving against the walls of your arteries  Normal BP is less than 120/80  Prehypertension is between 120/80 and 139/89  Hypertension is 140/90 or higher  Hypertension causes your BP to get so high that your heart has to work much harder than normal  This can damage your heart  Chronic hypertension is a long-term condition that you can control with a healthy lifestyle or medicines  A controlled blood pressure helps protect your organs, such as your heart, lungs, brain, and kidneys  Common symptoms include the following:   · Headache     · Blurred vision    · Chest pain     · Dizziness or weakness     · Trouble breathing     · Nosebleeds  Call 911 for any of the following:   · You have discomfort in your chest that feels like squeezing, pressure, fullness, or pain  · You become confused or have difficulty speaking  · You suddenly feel lightheaded or have trouble breathing  · You have pain or discomfort in your back, neck, jaw, stomach, or arm  Seek care immediately if:   · You have a severe headache or vision loss  · You have weakness in an arm or leg  Contact your healthcare provider if:   · You feel faint, dizzy, confused, or drowsy  · You have been taking your BP medicine and your BP is still higher than your healthcare provider says it should be  · You have questions or concerns about your condition or care  Treatment for chronic hypertension  may include medicine to lower your BP and lower your cholesterol level  A low cholesterol level helps prevent heart disease and makes it easier to control your blood pressure  Heart disease can make your blood pressure harder to control  You may also need to make lifestyle changes  Take your medicine exactly as directed    Manage chronic hypertension:  Talk with your healthcare provider about these and other ways to manage hypertension:  · Take your BP at home  Sit and rest for 5 minutes before you take your BP  Extend your arm and support it on a flat surface  Your arm should be at the same level as your heart  Follow the directions that came with your BP monitor  If possible, take at least 2 BP readings each time  Take your BP at least twice a day at the same times each day, such as morning and evening  Keep a record of your BP readings and bring it to your follow-up visits  Ask your healthcare provider what your blood pressure should be  · Limit sodium (salt) as directed  Too much sodium can affect your fluid balance  Check labels to find low-sodium or no-salt-added foods  Some low-sodium foods use potassium salts for flavor  Too much potassium can also cause health problems  Your healthcare provider will tell you how much sodium and potassium are safe for you to have in a day  He or she may recommend that you limit sodium to 2,300 mg a day  · Follow the meal plan recommended by your healthcare provider  A dietitian or your provider can give you more information on low-sodium plans or the DASH (Dietary Approaches to Stop Hypertension) eating plan  The DASH plan is low in sodium, unhealthy fats, and total fat  It is high in potassium, calcium, and fiber  · Exercise to maintain a healthy weight  Exercise at least 30 minutes per day, on most days of the week  This will help decrease your blood pressure  Ask about the best exercise plan for you  · Decrease stress  This may help lower your BP  Learn ways to relax, such as deep breathing or listening to music  · Limit alcohol  Women should limit alcohol to 1 drink a day  Men should limit alcohol to 2 drinks a day  A drink of alcohol is 12 ounces of beer, 5 ounces of wine, or 1½ ounces of liquor  · Do not smoke  Nicotine and other chemicals in cigarettes and cigars can increase your BP and also cause lung damage   Ask your healthcare provider for information if you currently smoke and need help to quit  E-cigarettes or smokeless tobacco still contain nicotine  Talk to your healthcare provider before you use these products  Follow up with your healthcare provider as directed: You will need to return to have your BP checked and to have other lab tests done  Write down your questions so you remember to ask them during your visits  © 2017 [address] Information is for End User's use only and may not be sold, redistributed or otherwise used for commercial purposes  All illustrations and images included in CareNotes® are the copyrighted property of [employer]  or [employer]ili[employer]ole  The above information is an  only  It is not intended as medical advice for individual conditions or treatments  Talk to your doctor, nurse or pharmacist before following any medical regimen to see if it is safe and effective for you  Weight Management   AMBULATORY CARE:   Why it is important to manage your weight:  Being overweight increases your risk of health conditions such as heart disease, high blood pressure, type 2 diabetes, and certain types of cancer  It can also increase your risk for osteoarthritis, sleep apnea, and other respiratory problems  Aim for a slow, steady weight loss  Even a small amount of weight loss can lower your risk of health problems  How to lose weight safely:  A safe and healthy way to lose weight is to eat fewer calories and get regular exercise  You can lose up about 1 pound a week by decreasing the number of calories you eat by 500 calories each day  You can decrease calories by eating smaller portion sizes or by cutting out high-calorie foods  Read labels to find out how many calories are in the foods you eat  You can also burn calories with exercise such as walking, swimming, or biking  You will be more likely to keep weight off if you make these changes part of your lifestyle     Healthy meal plan for weight management:  A healthy meal plan includes a variety of foods, contains fewer calories, and helps you stay healthy  A healthy meal plan includes the following:  · Eat whole-grain foods more often  A healthy meal plan should contain fiber  Fiber is the part of grains, fruits, and vegetables that is not broken down by your body  Whole-grain foods are healthy and provide extra fiber in your diet  Some examples of whole-grain foods are whole-wheat breads and pastas, oatmeal, brown rice, and bulgur  · Eat a variety of vegetables every day  Include dark, leafy greens such as spinach, kale, adamaris greens, and mustard greens  Eat yellow and orange vegetables such as carrots, sweet potatoes, and winter squash  · Eat a variety of fruits every day  Choose fresh or canned fruit (canned in its own juice or light syrup) instead of juice  Fruit juice has very little or no fiber  · Eat low-fat dairy foods  Drink fat-free (skim) milk or 1% milk  Eat fat-free yogurt and low-fat cottage cheese  Try low-fat cheeses such as mozzarella and other reduced-fat cheeses  · Choose meat and other protein foods that are low in fat  Choose beans or other legumes such as split peas or lentils  Choose fish, skinless poultry (chicken or turkey), or lean cuts of red meat (beef or pork)  Before you cook meat or poultry, cut off any visible fat  · Use less fat and oil  Try baking foods instead of frying them  Add less fat, such as margarine, sour cream, regular salad dressing and mayonnaise to foods  Eat fewer high-fat foods  Some examples of high-fat foods include french fries, doughnuts, ice cream, and cakes  · Eat fewer sweets  Limit foods and drinks that are high in sugar  This includes candy, cookies, regular soda, and sweetened drinks  Ways to decrease calories:   · Eat smaller portions  ¨ Use a small plate with smaller servings  ¨ Do not eat second helpings      ¨ When you eat at a restaurant, ask for a box and place half of your meal in the box before you eat  ¨ Share an entrée with someone else  · Replace high-calorie snacks with healthy, low-calorie snacks  ¨ Choose fresh fruit, vegetables, fat-free rice cakes, or air-popped popcorn instead of potato chips, nuts, or chocolate  ¨ Choose water or calorie-free drinks instead of soda or sweetened drinks  · Eat regular meals  Skipping meals can lead to overeating later in the day  Eat a healthy snack in place of a meal if you do not have time to eat a regular meal      · Do not shop for groceries when you are hungry  You may be more likely to make unhealthy food choices  Take a grocery list of healthy foods and shop after you have eaten  Exercise:  Exercise at least 30 minutes per day on most days of the week  Some examples of exercise include walking, biking, dancing, and swimming  You can also fit in more physical activity by taking the stairs instead of the elevator or parking farther away from stores  Ask your healthcare provider about the best exercise plan for you  Other things to consider as you try to lose weight:   · Be aware of situations that may give you the urge to overeat, such as eating while watching television  Find ways to avoid these situations  For example, read a book, go for a walk, or do crafts  · Meet with a weight loss support group or friends who are also trying to lose weight  This may help you stay motivated to continue working on your weight loss goals  © 2017 [facility] Information is for End User's use only and may not be sold, redistributed or otherwise used for commercial purposes  All illustrations and images included in CareNotes® are the copyrighted property of [employer]  or [employer]ole  The above information is an  only  It is not intended as medical advice for individual conditions or treatments   Talk to your doctor, nurse or pharmacist before following any medical regimen to see if it is safe and effective for you  Obesity   AMBULATORY CARE:   Obesity  is when your body mass index (BMI) is greater than 30  Your healthcare provider will use your height and weight to measure your BMI  The risks of obesity include  many health problems, such as injuries or physical disability  You may need tests to check for the following:  · Diabetes     · High blood pressure or high cholesterol     · Heart disease     · Gallbladder or liver disease     · Cancer of the colon, breast, prostate, liver, or kidney     · Sleep apnea     · Arthritis or gout  Seek care immediately if:   · You have a severe headache, confusion, or difficulty speaking  · You have weakness on one side of your body  · You have chest pain, sweating, or shortness of breath  Contact your healthcare provider if:   · You have symptoms of gallbladder or liver disease, such as pain in your upper abdomen  · You have knee or hip pain and discomfort while walking  · You have symptoms of diabetes, such as intense hunger and thirst, and frequent urination  · You have symptoms of sleep apnea, such as snoring or daytime sleepiness  · You have questions or concerns about your condition or care  Treatment for obesity  focuses on helping you lose weight to improve your health  Even a small decrease in BMI can reduce the risk for many health problems  Your healthcare provider will help you set a weight-loss goal   · Lifestyle changes  are the first step in treating obesity  These include making healthy food choices and getting regular physical activity  Your healthcare provider may suggest a weight-loss program that involves coaching, education, and therapy  · Medicine  may help you lose weight when it is used with a healthy diet and physical activity  · Surgery  can help you lose weight if you are very obese and have other health problems  There are several types of weight-loss surgery   Ask your healthcare provider for more information  Be successful losing weight:   · Set small, realistic goals  An example of a small goal is to walk for 20 minutes 5 days a week  Anther goal is to lose 5% of your body weight  · Tell friends, family members, and coworkers about your goals  and ask for their support  Ask a friend to lose weight with you, or join a weight-loss support group  · Identify foods or triggers that may cause you to overeat , and find ways to avoid them  Remove tempting high-calorie foods from your home and workplace  Place a bowl of fresh fruit on your kitchen counter  If stress causes you to eat, then find other ways to cope with stress  · Keep a diary to track what you eat and drink  Also write down how many minutes of physical activity you do each day  Weigh yourself once a week and record it in your diary  Eating changes: You will need to eat 500 to 1,000 fewer calories each day than you currently eat to lose 1 to 2 pounds a week  The following changes will help you cut calories:  · Eat smaller portions  Use small plates, no larger than 9 inches in diameter  Fill your plate half full of fruits and vegetables  Measure your food using measuring cups until you know what a serving size looks like  · Eat 3 meals and 1 or 2 snacks each day  Plan your meals in advance  Clif Carey and eat at home most of the time  Eat slowly  · Eat fruits and vegetables at every meal   They are low in calories and high in fiber, which makes you feel full  Do not add butter, margarine, or cream sauce to vegetables  Use herbs to season steamed vegetables  · Eat less fat and fewer fried foods  Eat more baked or grilled chicken and fish  These protein sources are lower in calories and fat than red meat  Limit fast food  Dress your salads with olive oil and vinegar instead of bottled dressing  · Limit the amount of sugar you eat  Do not drink sugary beverages  Limit alcohol    Activity changes:  Physical activity is good for your body in many ways  It helps you burn calories and build strong muscles  It decreases stress and depression, and improves your mood  It can also help you sleep better  Talk to your healthcare provider before you begin an exercise program   · Exercise for at least 30 minutes 5 days a week  Start slowly  Set aside time each day for physical activity that you enjoy and that is convenient for you  It is best to do both weight training and an activity that increases your heart rate, such as walking, bicycling, or swimming  · Find ways to be more active  Do yard work and housecleaning  Walk up the stairs instead of using elevators  Spend your leisure time going to events that require walking, such as outdoor festivals or fairs  This extra physical activity can help you lose weight and keep it off  Follow up with your healthcare provider as directed: You may need to meet with a dietitian  Write down your questions so you remember to ask them during your visits  © 2017 [address] Information is for End User's use only and may not be sold, redistributed or otherwise used for commercial purposes  All illustrations and images included in CareNotes® are the copyrighted property of [employer]  or Wili[employer] Poole  The above information is an  only  It is not intended as medical advice for individual conditions or treatments  Talk to your doctor, nurse or pharmacist before following any medical regimen to see if it is safe and effective for you  Urinary Incontinence   WHAT YOU NEED TO KNOW:   What is urinary incontinence? Urinary incontinence (UI) is when you lose control of your bladder  What causes UI? UI occurs because your bladder cannot store or empty urine properly  The following are the most common types of UI:  · Stress incontinence  is when you leak urine due to increased bladder pressure   This may happen when you cough, sneeze, or exercise  · Urge incontinence  is when you feel the need to urinate right away and leak urine accidentally  · Mixed incontinence  is when you have both stress and urge UI  What are the signs and symptoms of UI?   · You feel like your bladder does not empty completely when you urinate  · You urinate often and need to urinate immediately  · You leak urine when you sleep, or you wake up with the urge to urinate  · You leak urine when you cough, sneeze, exercise, or laugh  How is UI diagnosed? Your healthcare provider will ask how often you leak urine and whether you have stress or urge symptoms  Tell him which medicines you take, how often you urinate, and how much liquid you drink each day  You may need any of the following tests:  · Urine tests  may show infection or kidney function  · A pelvic exam  may be done to check for blockages  A pelvic exam will also show if your bladder, uterus, or other organs have moved out of place  · An x-ray, ultrasound, or CT  may show problems with parts of your urinary system  You may be given contrast liquid to help your organs show up better in the pictures  Tell the healthcare provider if you have ever had an allergic reaction to contrast liquid  Do not enter the MRI room with anything metal  Metal can cause serious injury  Tell the healthcare provider if you have any metal in or on your body  · A bladder scan  will show how much urine is left in your bladder after you urinate  You will be asked to urinate and then healthcare providers will use a small ultrasound machine to check the urine left in your bladder  · Cystometry  is used to check the function of your urinary system  Your healthcare provider checks the pressure in your bladder while filling it with fluid  Your bladder pressure may also be tested when your bladder is full and while you urinate  How is UI treated?    · Medicines  can help strengthen your bladder control  · Electrical stimulation  is used to send a small amount of electrical energy to your pelvic floor muscles  This helps control your bladder function  Electrodes may be placed outside your body or in your rectum  For women, the electrodes may be placed in the vagina  · A bulking agent  may be injected into the wall of your urethra to make it thicker  This helps keep your urethra closed and decreases urine leakage  · Devices  such as a clamp, pessary, or tampon may help stop urine leaks  Ask your healthcare provider for more information about these and other devices  · Surgery  may be needed if other treatments do not work  Several types of surgery can help improve your bladder control  Ask your healthcare provider for more information about the surgery you may need  How can I manage my symptoms? · Do pelvic muscle exercises often  Your pelvic muscles help you stop urinating  Squeeze these muscles tight for 5 seconds, then relax for 5 seconds  Gradually work up to squeezing for 10 seconds  Do 3 sets of 15 repetitions a day, or as directed  This will help strengthen your pelvic muscles and improve bladder control  · A catheter  may be used to help empty your bladder  A catheter is a tiny, plastic tube that is put into your bladder to drain your urine  Your healthcare provider may tell you to use a catheter to prevent your bladder from getting too full and leaking urine  · Keep a UI record  Write down how often you leak urine and how much you leak  Make a note of what you were doing when you leaked urine  · Train your bladder  Go to the bathroom at set times, such as every 2 hours, even if you do not feel the urge to go  You can also try to hold your urine when you feel the urge to go  For example, hold your urine for 5 minutes when you feel the urge to go  As that becomes easier, hold your urine for 10 minutes  · Drink liquids as directed    Ask your healthcare provider how much liquid to drink each day and which liquids are best for you  You may need to limit the amount of liquid you drink to help control your urine leakage  Limit or do not have drinks that contain caffeine or alcohol  Do not drink any liquid right before you go to bed  · Prevent constipation  Eat a variety of high-fiber foods  Good examples are high-fiber cereals, beans, vegetables, and whole-grain breads  Prune juice may help make your bowel movement softer  Walking is the best way to trigger your intestines to have a bowel movement  · Exercise regularly and maintain a healthy weight  Ask your healthcare provider how much you should weigh and about the best exercise plan for you  Weight loss and exercise will decrease pressure on your bladder and help you control your leakage  Ask him to help you create a weight loss plan if you are overweight  When should I seek immediate care? · You have severe pain  · You are confused or cannot think clearly  When should I contact my healthcare provider? · You have a fever  · You see blood in your urine  · You have pain when you urinate  · You have new or worse pain, even after treatment  · Your mouth feels dry or you have vision changes  · Your urine is cloudy or smells bad  · You have questions or concerns about your condition or care  CARE AGREEMENT:   You have the right to help plan your care  Learn about your health condition and how it may be treated  Discuss treatment options with your caregivers to decide what care you want to receive  You always have the right to refuse treatment  The above information is an  only  It is not intended as medical advice for individual conditions or treatments  Talk to your doctor, nurse or pharmacist before following any medical regimen to see if it is safe and effective for you    © 2017 [address] [employer] Information is for End User's use only and may not be sold, redistributed or otherwise used for commercial purposes  All illustrations and images included in CareNotes® are the copyrighted property of [employer]  or Wiliam Poole  Cigarette Smoking and Your Health   AMBULATORY CARE:   Risks to your health if you smoke:  Nicotine and other chemicals found in tobacco damage every cell in your body  Even if you are a light smoker, you have an increased risk for cancer, heart disease, and lung disease  If you are pregnant or have diabetes, smoking increases your risk for complications  Benefits to your health if you stop smoking:   · You decrease respiratory symptoms such as coughing, wheezing, and shortness of breath  · You reduce your risk for cancers of the lung, mouth, throat, kidney, bladder, pancreas, stomach, and cervix  If you already have cancer, you increase the benefits of chemotherapy  You also reduce your risk for cancer returning or a second cancer from developing  · You reduce your risk for heart disease, blood clots, heart attack, and stroke  · You reduce your risk for lung infections, and diseases such as pneumonia, asthma, chronic bronchitis, and emphysema  · Your circulation improves  More oxygen can be delivered to your body  If you have diabetes, you lower your risk for complications, such as kidney, artery, and eye diseases  You also lower your risk for nerve damage  Nerve damage can lead to amputations, poor vision, and blindness  · You improve your body's ability to heal and to fight infections  Benefits to the health of others if you stop smoking:  Tobacco is harmful to nonsmokers who breathe in your secondhand smoke  The following are ways the health of others around you may improve when you stop smoking:  · You lower the risks for lung cancer and heart disease in nonsmoking adults  · If you are pregnant, you lower the risk for miscarriage, early delivery, low birth weight, and stillbirth   You also lower your baby's risk for SIDS, obesity, developmental delay, and neurobehavioral problems, such as ADHD  · If you have children, you lower their risk for ear infections, colds, pneumonia, bronchitis, and asthma  For more information and support to stop smoking:   · Smokefree  gov  Phone: 4- 351 - 828-2535  Web Address: [place] smokefree  gov  Follow up with your healthcare provider as directed:  Write down your questions so you remember to ask them during your visits  © 2017 [address]  Information is for End User's use only and may not be sold, redistributed or otherwise used for commercial purposes  All illustrations and images included in CareNotes® are the copyrighted property of [employer]  or Wiliam Poole  The above information is an  only  It is not intended as medical advice for individual conditions or treatments  Talk to your doctor, nurse or pharmacist before following any medical regimen to see if it is safe and effective for you  Fall Prevention   AMBULATORY CARE:   Fall prevention  includes ways to make your home and other areas safer  It also includes ways you can move more carefully to prevent a fall  Health conditions that cause changes in your blood pressure, vision, or muscle strength and coordination may increase your risk for falls  Medicines may also increase your risk for falls if they make you dizzy, weak, or sleepy  Call 911 or have someone else call if:   · You have fallen and are unconscious  · You have fallen and cannot move part of your body  Contact your healthcare provider if:   · You have fallen and have pain or a headache  · You have questions or concerns about your condition or care  Fall prevention tips:   · Stand or sit up slowly  This may help you keep your balance and prevent falls  · Use assistive devices as directed  Your healthcare provider may suggest that you use a cane or walker to help you keep your balance   You may need to have grab bars put in your bathroom near the toilet or in the shower  · Wear shoes that fit well and have soles that   Wear shoes both inside and outside  Use slippers with good   Do not wear shoes with high heels  · Wear a personal alarm  This is a device that allows you to call 911 if you fall and need help  Ask your healthcare provider for more information  · Stay active  Exercise can help strengthen your muscles and improve your balance  Your healthcare provider may recommend water aerobics or walking  He or she may also recommend physical therapy to improve your coordination  Never start an exercise program without talking to your healthcare provider first      · Manage your medical conditions  Keep all appointments with your healthcare providers  Visit your eye doctor as directed  Home safety tips:   · Add items to prevent falls in the bathroom  Put nonslip strips on your bath or shower floor to prevent you from slipping  Use a bath mat if you do not have carpet in the bathroom  This will prevent you from falling when you step out of the bath or shower  Use a shower seat so you do not need to stand while you shower  Sit on the toilet or a chair in your bathroom to dry yourself and put on clothing  This will prevent you from losing your balance from drying or dressing yourself while you are standing  · Keep paths clear  Remove books, shoes, and other objects from walkways and stairs  Place cords for telephones and lamps out of the way so that you do not need to walk over them  Tape them down if you cannot move them  Remove small rugs  If you cannot remove a rug, secure it with double-sided tape  This will prevent you from tripping  · Install bright lights in your home  Use night lights to help light paths to the bathroom or kitchen  Always turn on the light before you start walking  · Keep items you use often on shelves within reach    Do not use a step stool to help you reach an item  · Paint or place reflective tape on the edges of your stairs  This will help you see the stairs better  Follow up with your healthcare provider as directed:  Write down your questions so you remember to ask them during your visits  © 2017 [address] Information is for End User's use only and may not be sold, redistributed or otherwise used for commercial purposes  All illustrations and images included in CareNotes® are the copyrighted property of A D A M , Inc  or Wiliam Poole  The above information is an  only  It is not intended as medical advice for individual conditions or treatments  Talk to your doctor, nurse or pharmacist before following any medical regimen to see if it is safe and effective for you  Advance Directives   WHAT YOU NEED TO KNOW:   What are advance directives? Advance directives are legal documents that state your wishes and plans for medical care  These plans are made ahead of time in case you lose your ability to make decisions for yourself  Advance directives can apply to any medical decision, such as the treatments you want, and if you want to donate organs  What are the types of advance directives? There are many types of advance directives, and each state has rules about how to use them  You may choose a combination of any of the following:  · Living will: This is a written record of the treatment you want  You can also choose which treatments you do not want, which to limit, and which to stop at a certain time  This includes surgery, medicine, IV fluid, and tube feedings  · Durable power of  for healthcare [facility]): This is a written record that states who you want to make healthcare choices for you when you are unable to make them for yourself  This person, called a proxy, is usually a family member or a friend  You may choose more than 1 proxy      · Do not resuscitate (DNR) order:  A DNR order is used in case your heart stops beating or you stop breathing  It is a request not to have certain forms of treatment, such as CPR  A DNR order may be included in other types of advance directives  · Medical directive: This covers the care that you want if you are in a coma, near death, or unable to make decisions for yourself  You can list the treatments you want for each condition  Treatment may include pain medicine, surgery, blood transfusions, dialysis, IV or tube feedings, and a ventilator (breathing machine)  · Values history: This document has questions about your views, beliefs, and how you feel and think about life  This information can help others choose the care that you would choose  Why are advance directives important? An advance directive helps you control your care  Although spoken wishes may be used, it is better to have your wishes written down  Spoken wishes can be misunderstood, or not followed  Treatments may be given even if you do not want them  An advance directive may make it easier for your family to make difficult choices about your care  How do I decide what to put in my advance directives? · Make informed decisions:  Make sure you fully understand treatments or care you may receive  Think about the benefits and problems your decisions could cause for you or your family  Talk to healthcare providers if you have concerns or questions before you write down your wishes  You may also want to talk with your Yazdan[employer] or , or a   Check your state laws to make sure that what you put in your advance directive is legal      · Sign all forms:  Sign and date your advance directive when you have finished  You may also need 2 witnesses to sign the forms  Witnesses cannot be your doctor or his staff, your spouse, heirs or beneficiaries, people you owe money to, or your chosen proxy  Talk to your family, proxy, and healthcare providers about your advance directive   Give each person a copy, and keep one for yourself in a place you can get to easily  Do not keep it hidden or locked away  · Review and revise your plans: You can revise your advance directive at any time, as long as you are able to make decisions  Review your plan every year, and when there are changes in your life, or your health  When you make changes, let your family, proxy, and healthcare providers know  Give each a new copy  Where can I find more information? · [employer]  [address] , [place] [address]  Phone: [phone]  Phone: [phone]  Web Address: http://www  aafp org  · [address] [facility])  [address] [facility] [place], [address] [facility] , [address] [place] [address]  Phone: [phone]  Phone: [phone]  Web Address: Lucy loaiza  CARE AGREEMENT:   You have the right to help plan your care  To help with this plan, you must learn about your health condition and treatment options  You must also learn about advance directives and how they are used  Work with your healthcare providers to decide what care will be used to treat you  You always have the right to refuse treatment  The above information is an  only  It is not intended as medical advice for individual conditions or treatments  Talk to your doctor, nurse or pharmacist before following any medical regimen to see if it is safe and effective for you  © 2017 [address]  Information is for End User's use only and may not be sold, redistributed or otherwise used for commercial purposes  All illustrations and images included in CareNotes® are the copyrighted property of [employer]  or Wiliam Poole

## 2019-03-04 NOTE — PROGRESS NOTES
Assessment and Plan:    Problem List Items Addressed This Visit        Cardiovascular and Mediastinum    Hypertension - Primary    Relevant Orders    Comprehensive metabolic panel    CBC and differential    Cerebrovascular disease, arteriosclerotic, post-stroke       Musculoskeletal and Integument    Osteoporosis       Genitourinary    CKD (chronic kidney disease), stage III (HCC)       Other    Gout    Mixed hyperlipidemia    Relevant Orders    Lipid Panel with Direct LDL reflex    TSH, 3rd generation with Free T4 reflex    Polymyalgia rheumatica (HCC)    Intermittent claudication ([address] )      Other Visit Diagnoses     Memory difficulties        Relevant Medications    donepezil (ARICEPT) 5 mg tablet    Medicare annual wellness visit, subsequent        Screening for breast cancer        Relevant Orders    Mammo screening bilateral w 3d & cad    Menopause ovarian failure        Relevant Orders    DXA bone density spine hip and pelvis    Screening for diabetes mellitus (DM)        Relevant Orders    Hemoglobin A1C    Screening for thyroid disorder        Relevant Orders    TSH, 3rd generation with Free T4 reflex    Screening for deficiency anemia        Overweight (BMI 25 0-29  9)            Health Maintenance Due   Topic Date Due    Medicare Annual Wellness Visit (AWV)  1940    BMI: Followup Plan  02/05/1958    INFLUENZA VACCINE  07/01/2018         HPI:  Senthil Nguyen is a 78 y o  female here for her Subsequent Wellness Visit      Patient Active Problem List   Diagnosis    CKD (chronic kidney disease), stage III ([address] )    Gout    Osteoporosis    Hypertension    Mixed hyperlipidemia    Cerebrovascular disease, arteriosclerotic, post-stroke    Peripheral vascular disease ([address] )    Polymyalgia rheumatica (HCC)    TIA (transient ischemic attack)    Intermittent claudication ([address] )     Past Medical History:   Diagnosis Date    Chronic kidney disease     Dementia     Gout     Hypertension     Osteoporosis      Past Surgical History:   Procedure Laterality Date    APPENDECTOMY      HYSTERECTOMY      TUBAL LIGATION      UTERINE FIBROID SURGERY       Family History   Problem Relation Age of Onset    Stroke Mother     Other Father         Coal workers' pneumoconiosis    Hypertension Father      Social History     Tobacco Use   Smoking Status Former Smoker   Smokeless Tobacco Never Used     Social History     Substance and Sexual Activity   Alcohol Use No      Social History     Substance and Sexual Activity   Drug Use No       Current Outpatient Medications   Medication Sig Dispense Refill    alendronate (FOSAMAX) 70 mg tablet Take 1 tablet (70 mg total) by mouth once a week 12 tablet 3    allopurinol (ZYLOPRIM) 100 mg tablet Take 1 tablet (100 mg total) by mouth daily 90 tablet 1    atenolol (TENORMIN) 25 mg tablet Take 1 tablet (25 mg total) by mouth daily 90 tablet 1    clopidogrel (PLAVIX) 75 mg tablet Take 1 tablet (75 mg total) by mouth daily 90 tablet 1    donepezil (ARICEPT) 5 mg tablet Take 1 tablet (5 mg total) by mouth daily at bedtime 90 tablet 3    hydrochlorothiazide (HYDRODIURIL) 50 mg tablet Take 1 tablet (50 mg total) by mouth daily 90 tablet 1    lisinopril (ZESTRIL) 40 mg tablet Take 1 tablet (40 mg total) by mouth daily 90 tablet 1    Multiple Vitamins-Minerals (MULTI ADULT GUMMIES) CHEW Chew 1 tablet daily      OMEGA-3 FATTY ACIDS-VITAMIN E PO Take 1,000 mg by mouth      pentoxifylline (TRENtal) 400 mg ER tablet Take 1 tablet (400 mg total) by mouth 3 (three) times a day with meals 90 tablet 1    simvastatin (ZOCOR) 40 mg tablet Take 1 tablet (40 mg total) by mouth daily 90 tablet 3     No current facility-administered medications for this visit        Allergies   Allergen Reactions    Oxycodone-Acetaminophen Rash     Immunization History   Administered Date(s) Administered    INFLUENZA 11/05/1997, 10/20/2005, 11/02/2006, 10/19/2007, 10/25/2008, 10/10/2009, 10/23/2010, 09/10/2011, 03/27/2014, 09/24/2014, 09/23/2015, 09/28/2016, 10/02/2017    Influenza Split High Dose Preservative Free IM 09/23/2015, 09/28/2016, 10/02/2017    Influenza TIV (IM) 10/20/2005, 11/02/2006, 10/19/2007, 10/25/2008, 10/10/2009, 10/23/2010, 09/10/2011    Influenza Whole 11/05/1997    Pneumococcal Conjugate 13-Valent 09/23/2015    Pneumococcal Polysaccharide PPV23 10/20/2005, 04/16/2012    Td (adult), adsorbed 11/05/1997, 10/20/2005, 02/05/2007    Tdap 07/01/2013       Patient Care Team:  Enedelia Cruz DO as PCP - General  Enedelia Cruz DO    Medicare Screening Tests and Risk Assessments: Carlene Coughlin is here for her Subsequent Wellness visit  Last Medicare Wellness visit information reviewed, patient interviewed and updates made to the record today  Health Risk Assessment:  Patient rates overall health as fair  Patient feels that their physical health rating is Same  Eyesight was rated as Slightly worse  Hearing was rated as Slightly worse  Patient feels that their emotional and mental health rating is Slightly worse  Pain experienced by patient in the last 7 days has been Some  Patient's pain rating has been 6/10  Patient states that she has experienced no weight loss or gain in last 6 months  Emotional/Mental Health:  Patient has been feeling nervous/anxious  PHQ-9 Depression Screening:    Frequency of the following problems over the past two weeks:      1  Little interest or pleasure in doing things: 0 - not at all      2  Feeling down, depressed, or hopeless: 0 - not at all  PHQ-2 Score: 0          Broken Bones/Falls: Fall Risk Assessment:    In the past year, patient has experienced: No history of falling in past year          Bladder/Bowel:  Patient has not leaked urine accidently in the last six months  Patient reports no loss of bowel control  Immunizations:  Patient has not had a flu vaccination within the last year  Patient has not received a pneumonia shot    Patient has not received a shingles shot  Patient has received tetanus/diphtheria shot  Home Safety:  Patient does not have trouble with stairs inside or outside of their home  Patient currently reports that there are no safety hazards present in home, working smoke alarms, working carbon monoxide detectors  Preventative Screenings:   Breast cancer screening performed, colon cancer screen completed, cholesterol screen completed, glaucoma eye exam completed,     Nutrition:  Current diet: Regular and Limited junk food with servings of the following:    Medications:  Patient is currently taking over-the-counter supplements  List of OTC medications includes: Multivitamin  Patient is able to manage medications  Lifestyle Choices:  Patient reports no tobacco use  Patient has smoked or used tobacco in the past   Patient has stopped her tobacco use  Patient reports alcohol use  Patient does not drive a vehicle  Patient wears seat belt  Current level of exercise of physical activity described by patient as: Not much           Activities of Daily Living:  Can get out of bed by his or her self, able to dress self, able to make own meals, able to do own shopping, able to bathe self, can do own laundry/housekeeping, can manage own money, pay bills and track expenses    Previous Hospitalizations:  Hospitalization or ED visit in past 12 months  Number of hospitalizations within the last year: 1-2        Advanced Directives:  Patient has decided on a power of   Patient has spoken to designated power of   Patient has completed advanced directive          Preventative Screening/Counseling:      Cardiovascular:      General: Screening Current      Counseling: Healthy Diet, Healthy Weight, Improve Cholesterol, Improve Blood Pressure and Improve Exercise Tolerance     Due for Labs/Analytes/Optional EKG: Lipid Panel          Diabetes:      General: Screening Current      Counseling: Healthy Diet, Healthy Weight and Improve Physical Activity      Due for labs: Blood Glucose          Colorectal Cancer:      General: Screening Current      Counseling: high fiber diet          Breast Cancer:      General: Screening Current          Cervical Cancer:      General: Screening Not Indicated          Osteoporosis:      General: Screening Current      Counseling: Calcium and Vitamin D Intake and Regular Weightbearing Exercise      Due for studies: Bone Density CT Appendicular and Bone Density CT Axial          AAA:      General: Screening Not Indicated          Glaucoma:      General: Screening Current          HIV:      General: Screening Not Indicated          Hepatitis C:      General: Screening Not Indicated        Advanced Directives:   Patient has living will for healthcare, has durable POA for healthcare, patient has an advanced directive  Information on ACP and/or AD provided  No 5 wishes given  End of life assessment reviewed with patient  Provider agrees with end of life decisions   Immunizations:      Influenza: Influenza UTD This Year      Pneumococcal: Lifetime Vaccine Completed      TDAP: Tdap Vaccine UTD      Other Preventative Counseling (Non-Medicare): Fall Prevention, Increase physical activity, Nutrition Counseling, Car/seat belt/driving safety reviewed and Weight reduction discussed      A/P: Doing well and no depression  Denies any falls and feels safe at home  Diverse diet  Doesn't drive, but uses seat belts  Has a living will and POA  No DME or referrals needed today  RTC one year for medicare wellness  BMI Counseling: Body mass index is 26 43 kg/m²  Discussed the patient's BMI with her  The BMI is above average  BMI counseling and education was provided to the patient  Nutrition recommendations include reducing portion sizes and decreasing overall calorie intake  Exercise recommendations include moderate aerobic physical activity for 150 minutes/week

## 2019-03-28 ENCOUNTER — APPOINTMENT (OUTPATIENT)
Dept: LAB | Facility: CLINIC | Age: 79
End: 2019-03-28
Payer: COMMERCIAL

## 2019-03-28 DIAGNOSIS — I10 ESSENTIAL HYPERTENSION: ICD-10-CM

## 2019-03-28 DIAGNOSIS — E78.2 MIXED HYPERLIPIDEMIA: ICD-10-CM

## 2019-03-28 DIAGNOSIS — Z13.1 SCREENING FOR DIABETES MELLITUS (DM): ICD-10-CM

## 2019-03-28 DIAGNOSIS — Z13.29 SCREENING FOR THYROID DISORDER: ICD-10-CM

## 2019-03-28 LAB
ALBUMIN SERPL BCP-MCNC: 3.5 G/DL (ref 3.5–5)
ALP SERPL-CCNC: 90 U/L (ref 46–116)
ALT SERPL W P-5'-P-CCNC: 29 U/L (ref 12–78)
ANION GAP SERPL CALCULATED.3IONS-SCNC: 5 MMOL/L (ref 4–13)
AST SERPL W P-5'-P-CCNC: 29 U/L (ref 5–45)
BASOPHILS # BLD AUTO: 0.1 THOUSANDS/ΜL (ref 0–0.1)
BASOPHILS NFR BLD AUTO: 1 % (ref 0–1)
BILIRUB SERPL-MCNC: 0.49 MG/DL (ref 0.2–1)
BUN SERPL-MCNC: 20 MG/DL (ref 5–25)
CALCIUM SERPL-MCNC: 8.8 MG/DL (ref 8.3–10.1)
CHLORIDE SERPL-SCNC: 102 MMOL/L (ref 100–108)
CHOLEST SERPL-MCNC: 237 MG/DL (ref 50–200)
CO2 SERPL-SCNC: 31 MMOL/L (ref 21–32)
CREAT SERPL-MCNC: 1.11 MG/DL (ref 0.6–1.3)
EOSINOPHIL # BLD AUTO: 0.97 THOUSAND/ΜL (ref 0–0.61)
EOSINOPHIL NFR BLD AUTO: 12 % (ref 0–6)
ERYTHROCYTE [DISTWIDTH] IN BLOOD BY AUTOMATED COUNT: 13.6 % (ref 11.6–15.1)
EST. AVERAGE GLUCOSE BLD GHB EST-MCNC: 108 MG/DL
GFR SERPL CREATININE-BSD FRML MDRD: 47 ML/MIN/1.73SQ M
GLUCOSE P FAST SERPL-MCNC: 84 MG/DL (ref 65–99)
HBA1C MFR BLD: 5.4 % (ref 4.2–6.3)
HCT VFR BLD AUTO: 40.9 % (ref 34.8–46.1)
HDLC SERPL-MCNC: 51 MG/DL (ref 40–60)
HGB BLD-MCNC: 12.5 G/DL (ref 11.5–15.4)
IMM GRANULOCYTES # BLD AUTO: 0.02 THOUSAND/UL (ref 0–0.2)
IMM GRANULOCYTES NFR BLD AUTO: 0 % (ref 0–2)
LDLC SERPL CALC-MCNC: 163 MG/DL (ref 0–100)
LYMPHOCYTES # BLD AUTO: 2.37 THOUSANDS/ΜL (ref 0.6–4.47)
LYMPHOCYTES NFR BLD AUTO: 28 % (ref 14–44)
MCH RBC QN AUTO: 28.2 PG (ref 26.8–34.3)
MCHC RBC AUTO-ENTMCNC: 30.6 G/DL (ref 31.4–37.4)
MCV RBC AUTO: 92 FL (ref 82–98)
MONOCYTES # BLD AUTO: 0.69 THOUSAND/ΜL (ref 0.17–1.22)
MONOCYTES NFR BLD AUTO: 8 % (ref 4–12)
NEUTROPHILS # BLD AUTO: 4.25 THOUSANDS/ΜL (ref 1.85–7.62)
NEUTS SEG NFR BLD AUTO: 51 % (ref 43–75)
NRBC BLD AUTO-RTO: 0 /100 WBCS
PLATELET # BLD AUTO: 313 THOUSANDS/UL (ref 149–390)
PMV BLD AUTO: 10.2 FL (ref 8.9–12.7)
POTASSIUM SERPL-SCNC: 3.6 MMOL/L (ref 3.5–5.3)
PROT SERPL-MCNC: 8 G/DL (ref 6.4–8.2)
RBC # BLD AUTO: 4.43 MILLION/UL (ref 3.81–5.12)
SODIUM SERPL-SCNC: 138 MMOL/L (ref 136–145)
TRIGL SERPL-MCNC: 117 MG/DL
TSH SERPL DL<=0.05 MIU/L-ACNC: 3.65 UIU/ML (ref 0.36–3.74)
WBC # BLD AUTO: 8.4 THOUSAND/UL (ref 4.31–10.16)

## 2019-03-28 PROCEDURE — 84443 ASSAY THYROID STIM HORMONE: CPT

## 2019-03-28 PROCEDURE — 36415 COLL VENOUS BLD VENIPUNCTURE: CPT

## 2019-03-28 PROCEDURE — 80053 COMPREHEN METABOLIC PANEL: CPT

## 2019-03-28 PROCEDURE — 80061 LIPID PANEL: CPT

## 2019-03-28 PROCEDURE — 85025 COMPLETE CBC W/AUTO DIFF WBC: CPT

## 2019-03-28 PROCEDURE — 83036 HEMOGLOBIN GLYCOSYLATED A1C: CPT

## 2019-03-29 DIAGNOSIS — E78.2 MIXED HYPERLIPIDEMIA: Primary | ICD-10-CM

## 2019-03-29 RX ORDER — SIMVASTATIN 80 MG
80 TABLET ORAL DAILY
Qty: 90 TABLET | Refills: 1 | Status: SHIPPED | OUTPATIENT
Start: 2019-03-29 | End: 2019-05-10 | Stop reason: ALTCHOICE

## 2019-04-25 ENCOUNTER — TELEPHONE (OUTPATIENT)
Dept: INTERNAL MEDICINE CLINIC | Facility: CLINIC | Age: 79
End: 2019-04-25

## 2019-05-10 ENCOUNTER — DOCUMENTATION (OUTPATIENT)
Dept: INTERNAL MEDICINE CLINIC | Facility: CLINIC | Age: 79
End: 2019-05-10

## 2019-05-10 DIAGNOSIS — E78.2 MIXED HYPERLIPIDEMIA: ICD-10-CM

## 2019-05-10 RX ORDER — SIMVASTATIN 40 MG
40 TABLET ORAL
COMMUNITY
End: 2019-05-10 | Stop reason: SDUPTHER

## 2019-05-10 RX ORDER — SIMVASTATIN 40 MG
40 TABLET ORAL
Qty: 90 TABLET | Refills: 1 | Status: SHIPPED | OUTPATIENT
Start: 2019-05-10 | End: 2019-08-13 | Stop reason: SDUPTHER

## 2019-06-17 ENCOUNTER — OFFICE VISIT (OUTPATIENT)
Dept: URGENT CARE | Facility: CLINIC | Age: 79
End: 2019-06-17
Payer: COMMERCIAL

## 2019-06-17 VITALS
HEART RATE: 74 BPM | DIASTOLIC BLOOD PRESSURE: 76 MMHG | RESPIRATION RATE: 16 BRPM | SYSTOLIC BLOOD PRESSURE: 167 MMHG | TEMPERATURE: 100.8 F | OXYGEN SATURATION: 93 %

## 2019-06-17 DIAGNOSIS — B96.89 ACUTE BACTERIAL BRONCHITIS: Primary | ICD-10-CM

## 2019-06-17 DIAGNOSIS — J20.8 ACUTE BACTERIAL BRONCHITIS: Primary | ICD-10-CM

## 2019-06-17 PROCEDURE — 99213 OFFICE O/P EST LOW 20 MIN: CPT | Performed by: PHYSICIAN ASSISTANT

## 2019-06-17 PROCEDURE — S9088 SERVICES PROVIDED IN URGENT: HCPCS | Performed by: PHYSICIAN ASSISTANT

## 2019-06-17 RX ORDER — AZITHROMYCIN 250 MG/1
TABLET, FILM COATED ORAL
Qty: 6 TABLET | Refills: 0 | Status: SHIPPED | OUTPATIENT
Start: 2019-06-17 | End: 2019-06-21

## 2019-08-13 DIAGNOSIS — M1A.9XX0 CHRONIC GOUT WITHOUT TOPHUS, UNSPECIFIED CAUSE, UNSPECIFIED SITE: ICD-10-CM

## 2019-08-13 DIAGNOSIS — I63.9 CEREBROVASCULAR ACCIDENT (CVA), UNSPECIFIED MECHANISM (HCC): ICD-10-CM

## 2019-08-13 DIAGNOSIS — E78.2 MIXED HYPERLIPIDEMIA: ICD-10-CM

## 2019-08-13 DIAGNOSIS — M81.0 AGE-RELATED OSTEOPOROSIS WITHOUT CURRENT PATHOLOGICAL FRACTURE: ICD-10-CM

## 2019-08-13 DIAGNOSIS — I10 ESSENTIAL HYPERTENSION: ICD-10-CM

## 2019-08-13 RX ORDER — ALENDRONATE SODIUM 70 MG/1
70 TABLET ORAL WEEKLY
Qty: 12 TABLET | Refills: 3 | Status: SHIPPED | OUTPATIENT
Start: 2019-08-13 | End: 2020-06-15

## 2019-08-13 RX ORDER — LISINOPRIL 40 MG/1
40 TABLET ORAL DAILY
Qty: 90 TABLET | Refills: 1 | Status: SHIPPED | OUTPATIENT
Start: 2019-08-13 | End: 2019-12-21 | Stop reason: SDUPTHER

## 2019-08-13 RX ORDER — HYDROCHLOROTHIAZIDE 50 MG/1
50 TABLET ORAL DAILY
Qty: 90 TABLET | Refills: 1 | Status: SHIPPED | OUTPATIENT
Start: 2019-08-13 | End: 2019-12-21 | Stop reason: SDUPTHER

## 2019-08-13 RX ORDER — PENTOXIFYLLINE 400 MG/1
400 TABLET, EXTENDED RELEASE ORAL
Qty: 90 TABLET | Refills: 1 | Status: SHIPPED | OUTPATIENT
Start: 2019-08-13 | End: 2019-12-21 | Stop reason: SDUPTHER

## 2019-08-13 RX ORDER — ATENOLOL 25 MG/1
25 TABLET ORAL DAILY
Qty: 90 TABLET | Refills: 1 | Status: SHIPPED | OUTPATIENT
Start: 2019-08-13 | End: 2019-09-18

## 2019-08-13 RX ORDER — CLOPIDOGREL BISULFATE 75 MG/1
75 TABLET ORAL DAILY
Qty: 90 TABLET | Refills: 1 | Status: SHIPPED | OUTPATIENT
Start: 2019-08-13 | End: 2019-12-21 | Stop reason: SDUPTHER

## 2019-08-13 RX ORDER — ALLOPURINOL 100 MG/1
100 TABLET ORAL DAILY
Qty: 90 TABLET | Refills: 1 | Status: SHIPPED | OUTPATIENT
Start: 2019-08-13 | End: 2019-12-21 | Stop reason: SDUPTHER

## 2019-08-13 RX ORDER — SIMVASTATIN 40 MG
40 TABLET ORAL
Qty: 90 TABLET | Refills: 1 | Status: SHIPPED | OUTPATIENT
Start: 2019-08-13 | End: 2019-09-05 | Stop reason: HOSPADM

## 2019-08-14 ENCOUNTER — OFFICE VISIT (OUTPATIENT)
Dept: URGENT CARE | Facility: CLINIC | Age: 79
End: 2019-08-14
Payer: COMMERCIAL

## 2019-08-14 VITALS
HEIGHT: 64 IN | OXYGEN SATURATION: 96 % | BODY MASS INDEX: 26.29 KG/M2 | HEART RATE: 73 BPM | SYSTOLIC BLOOD PRESSURE: 120 MMHG | TEMPERATURE: 99.3 F | WEIGHT: 154 LBS | DIASTOLIC BLOOD PRESSURE: 64 MMHG | RESPIRATION RATE: 22 BRPM

## 2019-08-14 DIAGNOSIS — H65.111 ACUTE MUCOID OTITIS MEDIA OF RIGHT EAR: Primary | ICD-10-CM

## 2019-08-14 PROCEDURE — 99213 OFFICE O/P EST LOW 20 MIN: CPT | Performed by: PHYSICIAN ASSISTANT

## 2019-08-14 PROCEDURE — S9088 SERVICES PROVIDED IN URGENT: HCPCS | Performed by: PHYSICIAN ASSISTANT

## 2019-08-14 RX ORDER — AMOXICILLIN 875 MG/1
875 TABLET, COATED ORAL 2 TIMES DAILY
Qty: 20 TABLET | Refills: 0 | Status: SHIPPED | OUTPATIENT
Start: 2019-08-14 | End: 2019-08-24

## 2019-08-14 NOTE — PATIENT INSTRUCTIONS
I have prescribed an antibiotic for the infection  Please take the antibiotic as prescribed and finish the entire prescription  I recommend that the patient takes an over the counter probiotic or eats yogurt with live cultures in it [place]) to keep good bacteria in the gut and help prevent diarrhea  Wash hands frequently to prevent the spread of infection  Can use over the counter cough and cold medications to help with symptoms  tylenol as needed for pain or fever  If not improving over the next 7-10 days, follow up with PCP

## 2019-08-14 NOTE — PROGRESS NOTES
[facility]    NAME: Cassandra Maddox is a 78 y o  female  : 1940    MRN: [id number]  DATE: 2019  TIME: 7:02 PM    Assessment and Plan   Acute mucoid otitis media of right ear [H65 111]  1  Acute mucoid otitis media of right ear  amoxicillin (AMOXIL) 875 mg tablet       Patient Instructions     Patient Instructions   I have prescribed an antibiotic for the infection  Please take the antibiotic as prescribed and finish the entire prescription  I recommend that the patient takes an over the counter probiotic or eats yogurt with live cultures in it [place]) to keep good bacteria in the gut and help prevent diarrhea  Wash hands frequently to prevent the spread of infection  Can use over the counter cough and cold medications to help with symptoms  tylenol as needed for pain or fever  If not improving over the next 7-10 days, follow up with PCP  Chief Complaint     Chief Complaint   Patient presents with    Earache     right ear pain for 3 days       History of Present Illness   78-year-old female here with complaint of right ear pain for the last 3 days  Patient states that she is having difficulty sleeping due to the pain  No cough or runny stuffy nose  Pain radiates down into her neck  Denies pain with touching her ear  Review of Systems   Review of Systems   Constitutional: Negative for appetite change, chills and fever  HENT: Positive for ear pain  Negative for congestion, ear discharge, facial swelling, postnasal drip, sinus pressure, sneezing and sore throat  Respiratory: Negative for cough, shortness of breath and wheezing  Neurological: Negative for headaches         Current Medications     Current Outpatient Medications:     alendronate (FOSAMAX) 70 mg tablet, Take 1 tablet (70 mg total) by mouth once a week, Disp: 12 tablet, Rfl: 3    allopurinol (ZYLOPRIM) 100 mg tablet, Take 1 tablet (100 mg total) by mouth daily, Disp: 90 tablet, Rfl: 1   atenolol (TENORMIN) 25 mg tablet, Take 1 tablet (25 mg total) by mouth daily, Disp: 90 tablet, Rfl: 1    clopidogrel (PLAVIX) 75 mg tablet, Take 1 tablet (75 mg total) by mouth daily, Disp: 90 tablet, Rfl: 1    donepezil (ARICEPT) 5 mg tablet, Take 1 tablet (5 mg total) by mouth daily at bedtime, Disp: 90 tablet, Rfl: 3    hydrochlorothiazide (HYDRODIURIL) 50 mg tablet, Take 1 tablet (50 mg total) by mouth daily, Disp: 90 tablet, Rfl: 1    lisinopril (ZESTRIL) 40 mg tablet, Take 1 tablet (40 mg total) by mouth daily, Disp: 90 tablet, Rfl: 1    Multiple Vitamins-Minerals (MULTI ADULT GUMMIES) CHEW, Chew 1 tablet daily, Disp: , Rfl:     OMEGA-3 FATTY ACIDS-VITAMIN E PO, Take 1,000 mg by mouth, Disp: , Rfl:     pentoxifylline (TRENtal) 400 mg ER tablet, Take 1 tablet (400 mg total) by mouth 3 (three) times a day with meals, Disp: 90 tablet, Rfl: 1    simvastatin (ZOCOR) 40 mg tablet, Take 1 tablet (40 mg total) by mouth daily at bedtime, Disp: 90 tablet, Rfl: 1    amoxicillin (AMOXIL) 875 mg tablet, Take 1 tablet (875 mg total) by mouth 2 (two) times a day for 10 days, Disp: 20 tablet, Rfl: 0    Current Allergies     Allergies as of 08/14/2019 - Reviewed 08/14/2019   Allergen Reaction Noted    Oxycodone-acetaminophen Rash 03/21/2008          The following portions of the patient's history were reviewed and updated as appropriate: allergies, current medications, past family history, past medical history, past social history, past surgical history and problem list    Past Medical History:   Diagnosis Date    Chronic kidney disease     Dementia     Gout     Hypertension     Osteoporosis      Past Surgical History:   Procedure Laterality Date    APPENDECTOMY      HYSTERECTOMY      TUBAL LIGATION      UTERINE FIBROID SURGERY       Family History   Problem Relation Age of Onset    Stroke Mother     Other Father         Coal workers' pneumoconiosis    Hypertension Father      Social History Socioeconomic History    Marital status:      Spouse name: Not on file    Number of children: 3    Years of education: Not on file    Highest education level: Not on file   Occupational History    Occupation: Store Owner   Social Needs    Financial resource strain: Not on file    Food insecurity:     Worry: Not on file     Inability: Not on file   [facility] needs:     Medical: Not on file     Non-medical: Not on file   Tobacco Use    Smoking status: Former Smoker    Smokeless tobacco: Never Used   Substance and Sexual Activity    Alcohol use: No    Drug use: No    Sexual activity: Not on file   Lifestyle    Physical activity:     Days per week: Not on file     Minutes per session: Not on file    Stress: Not on file   Relationships    Social connections:     Talks on phone: Not on file     Gets together: Not on file     Attends Hindu service: Not on file     Active member of club or organization: Not on file     Attends meetings of clubs or organizations: Not on file     Relationship status: Not on file    Intimate partner violence:     Fear of current or ex partner: Not on file     Emotionally abused: Not on file     Physically abused: Not on file     Forced sexual activity: Not on file   Other Topics Concern    Not on file   Social History Narrative    Lives with adult children           Medications have been verified  Objective   /64   Pulse 73   Temp 99 3 °F (37 4 °C) (Tympanic)   Resp 22   Ht 5' 4" (1 626 m)   Wt 69 9 kg (154 lb)   SpO2 96%   BMI 26 43 kg/m²      Physical Exam   Physical Exam   Constitutional: She appears well-developed and well-nourished  No distress  HENT:   Head: Normocephalic and atraumatic  Right Ear: Tympanic membrane is erythematous and bulging  Left Ear: Tympanic membrane normal    Nose: Mucosal edema present  No rhinorrhea  Right sinus exhibits no maxillary sinus tenderness and no frontal sinus tenderness   Left sinus exhibits no maxillary sinus tenderness and no frontal sinus tenderness  Mouth/Throat: Oropharynx is clear and moist  No oropharyngeal exudate, posterior oropharyngeal edema or posterior oropharyngeal erythema  Eyes: Conjunctivae are normal    Cardiovascular: Normal rate, regular rhythm and normal heart sounds  No murmur heard  Pulmonary/Chest: Effort normal and breath sounds normal    Nursing note and vitals reviewed

## 2019-09-04 ENCOUNTER — APPOINTMENT (EMERGENCY)
Dept: RADIOLOGY | Facility: HOSPITAL | Age: 79
DRG: 065 | End: 2019-09-04
Payer: COMMERCIAL

## 2019-09-04 ENCOUNTER — APPOINTMENT (EMERGENCY)
Dept: CT IMAGING | Facility: HOSPITAL | Age: 79
DRG: 065 | End: 2019-09-04
Payer: COMMERCIAL

## 2019-09-04 ENCOUNTER — APPOINTMENT (INPATIENT)
Dept: NON INVASIVE DIAGNOSTICS | Facility: HOSPITAL | Age: 79
DRG: 065 | End: 2019-09-04
Payer: COMMERCIAL

## 2019-09-04 ENCOUNTER — HOSPITAL ENCOUNTER (INPATIENT)
Facility: HOSPITAL | Age: 79
LOS: 1 days | Discharge: HOME | DRG: 065 | End: 2019-09-05
Attending: INTERNAL MEDICINE | Admitting: INTERNAL MEDICINE
Payer: COMMERCIAL

## 2019-09-04 ENCOUNTER — APPOINTMENT (INPATIENT)
Dept: MRI IMAGING | Facility: HOSPITAL | Age: 79
DRG: 065 | End: 2019-09-04
Payer: COMMERCIAL

## 2019-09-04 DIAGNOSIS — G45.9 TIA (TRANSIENT ISCHEMIC ATTACK): Primary | ICD-10-CM

## 2019-09-04 DIAGNOSIS — I16.9 HYPERTENSIVE CRISIS: ICD-10-CM

## 2019-09-04 LAB
ANION GAP SERPL CALCULATED.3IONS-SCNC: 8 MMOL/L (ref 4–13)
APTT PPP: 32 SECONDS (ref 23–37)
ATRIAL RATE: 66 BPM
BUN SERPL-MCNC: 22 MG/DL (ref 7–25)
CALCIUM SERPL-MCNC: 9.2 MG/DL (ref 8.6–10.5)
CHLORIDE SERPL-SCNC: 101 MMOL/L (ref 98–107)
CO2 SERPL-SCNC: 30 MMOL/L (ref 21–31)
CREAT SERPL-MCNC: 1.16 MG/DL (ref 0.6–1.2)
ERYTHROCYTE [DISTWIDTH] IN BLOOD BY AUTOMATED COUNT: 15 % (ref 11.5–14.5)
GFR SERPL CREATININE-BSD FRML MDRD: 45 ML/MIN/1.73SQ M
GLUCOSE SERPL-MCNC: 99 MG/DL (ref 65–99)
HCT VFR BLD AUTO: 38.2 % (ref 42–47)
HGB BLD-MCNC: 12.4 G/DL (ref 12–16)
INR PPP: 1.08 (ref 0.9–1.5)
MCH RBC QN AUTO: 27.9 PG (ref 26–34)
MCHC RBC AUTO-ENTMCNC: 32.6 G/DL (ref 31–37)
MCV RBC AUTO: 86 FL (ref 81–99)
P AXIS: 37 DEGREES
PLATELET # BLD AUTO: 362 THOUSANDS/UL (ref 149–390)
PMV BLD AUTO: 7.5 FL (ref 8.6–11.7)
POTASSIUM SERPL-SCNC: 3.6 MMOL/L (ref 3.5–5.5)
PR INTERVAL: 184 MS
PROTHROMBIN TIME: 12.5 SECONDS (ref 10.2–13)
QRS AXIS: 1 DEGREES
QRSD INTERVAL: 72 MS
QT INTERVAL: 442 MS
QTC INTERVAL: 463 MS
RBC # BLD AUTO: 4.45 MILLION/UL (ref 3.9–5.2)
SODIUM SERPL-SCNC: 139 MMOL/L (ref 134–143)
T WAVE AXIS: 2 DEGREES
VENTRICULAR RATE: 66 BPM
WBC # BLD AUTO: 8.9 THOUSAND/UL (ref 4.8–10.8)

## 2019-09-04 PROCEDURE — 71045 X-RAY EXAM CHEST 1 VIEW: CPT

## 2019-09-04 PROCEDURE — 97167 OT EVAL HIGH COMPLEX 60 MIN: CPT

## 2019-09-04 PROCEDURE — 99223 1ST HOSP IP/OBS HIGH 75: CPT | Performed by: INTERNAL MEDICINE

## 2019-09-04 PROCEDURE — 97163 PT EVAL HIGH COMPLEX 45 MIN: CPT

## 2019-09-04 PROCEDURE — G8988 SELF CARE GOAL STATUS: HCPCS

## 2019-09-04 PROCEDURE — 70498 CT ANGIOGRAPHY NECK: CPT

## 2019-09-04 PROCEDURE — 85730 THROMBOPLASTIN TIME PARTIAL: CPT | Performed by: INTERNAL MEDICINE

## 2019-09-04 PROCEDURE — 85610 PROTHROMBIN TIME: CPT | Performed by: INTERNAL MEDICINE

## 2019-09-04 PROCEDURE — G8979 MOBILITY GOAL STATUS: HCPCS

## 2019-09-04 PROCEDURE — 96374 THER/PROPH/DIAG INJ IV PUSH: CPT

## 2019-09-04 PROCEDURE — 99285 EMERGENCY DEPT VISIT HI MDM: CPT

## 2019-09-04 PROCEDURE — 93010 ELECTROCARDIOGRAM REPORT: CPT | Performed by: INTERNAL MEDICINE

## 2019-09-04 PROCEDURE — 93306 TTE W/DOPPLER COMPLETE: CPT

## 2019-09-04 PROCEDURE — 85027 COMPLETE CBC AUTOMATED: CPT | Performed by: INTERNAL MEDICINE

## 2019-09-04 PROCEDURE — 82948 REAGENT STRIP/BLOOD GLUCOSE: CPT

## 2019-09-04 PROCEDURE — 93005 ELECTROCARDIOGRAM TRACING: CPT

## 2019-09-04 PROCEDURE — G8978 MOBILITY CURRENT STATUS: HCPCS

## 2019-09-04 PROCEDURE — 80048 BASIC METABOLIC PNL TOTAL CA: CPT | Performed by: INTERNAL MEDICINE

## 2019-09-04 PROCEDURE — 36415 COLL VENOUS BLD VENIPUNCTURE: CPT | Performed by: INTERNAL MEDICINE

## 2019-09-04 PROCEDURE — 70551 MRI BRAIN STEM W/O DYE: CPT

## 2019-09-04 PROCEDURE — G8987 SELF CARE CURRENT STATUS: HCPCS

## 2019-09-04 PROCEDURE — 93306 TTE W/DOPPLER COMPLETE: CPT | Performed by: INTERNAL MEDICINE

## 2019-09-04 PROCEDURE — 70496 CT ANGIOGRAPHY HEAD: CPT

## 2019-09-04 RX ORDER — ALLOPURINOL 100 MG/1
100 TABLET ORAL DAILY
Status: DISCONTINUED | OUTPATIENT
Start: 2019-09-04 | End: 2019-09-05 | Stop reason: HOSPADM

## 2019-09-04 RX ORDER — PENTOXIFYLLINE 400 MG/1
400 TABLET, EXTENDED RELEASE ORAL 2 TIMES DAILY WITH MEALS
Status: DISCONTINUED | OUTPATIENT
Start: 2019-09-04 | End: 2019-09-05 | Stop reason: HOSPADM

## 2019-09-04 RX ORDER — LABETALOL 20 MG/4 ML (5 MG/ML) INTRAVENOUS SYRINGE
20 ONCE
Status: COMPLETED | OUTPATIENT
Start: 2019-09-04 | End: 2019-09-04

## 2019-09-04 RX ORDER — ONDANSETRON 2 MG/ML
4 INJECTION INTRAMUSCULAR; INTRAVENOUS EVERY 6 HOURS PRN
Status: DISCONTINUED | OUTPATIENT
Start: 2019-09-04 | End: 2019-09-05 | Stop reason: HOSPADM

## 2019-09-04 RX ORDER — LISINOPRIL 20 MG/1
40 TABLET ORAL DAILY
Status: DISCONTINUED | OUTPATIENT
Start: 2019-09-05 | End: 2019-09-05 | Stop reason: HOSPADM

## 2019-09-04 RX ORDER — HEPARIN SODIUM 5000 [USP'U]/ML
5000 INJECTION, SOLUTION INTRAVENOUS; SUBCUTANEOUS EVERY 8 HOURS SCHEDULED
Status: DISCONTINUED | OUTPATIENT
Start: 2019-09-04 | End: 2019-09-05 | Stop reason: HOSPADM

## 2019-09-04 RX ORDER — CLOPIDOGREL BISULFATE 75 MG/1
75 TABLET ORAL DAILY
Status: DISCONTINUED | OUTPATIENT
Start: 2019-09-04 | End: 2019-09-05 | Stop reason: HOSPADM

## 2019-09-04 RX ORDER — PRAVASTATIN SODIUM 20 MG
80 TABLET ORAL
Status: DISCONTINUED | OUTPATIENT
Start: 2019-09-04 | End: 2019-09-05 | Stop reason: HOSPADM

## 2019-09-04 RX ORDER — LISINOPRIL 10 MG/1
20 TABLET ORAL ONCE
Status: COMPLETED | OUTPATIENT
Start: 2019-09-04 | End: 2019-09-04

## 2019-09-04 RX ORDER — ATENOLOL 25 MG/1
25 TABLET ORAL DAILY
Status: DISCONTINUED | OUTPATIENT
Start: 2019-09-04 | End: 2019-09-05 | Stop reason: HOSPADM

## 2019-09-04 RX ORDER — CLOPIDOGREL BISULFATE 75 MG/1
75 TABLET ORAL ONCE
Status: COMPLETED | OUTPATIENT
Start: 2019-09-04 | End: 2019-09-04

## 2019-09-04 RX ORDER — ACETAMINOPHEN 325 MG/1
650 TABLET ORAL EVERY 6 HOURS PRN
Status: DISCONTINUED | OUTPATIENT
Start: 2019-09-04 | End: 2019-09-05 | Stop reason: HOSPADM

## 2019-09-04 RX ADMIN — HEPARIN SODIUM 5000 UNITS: 5000 INJECTION INTRAVENOUS; SUBCUTANEOUS at 14:21

## 2019-09-04 RX ADMIN — ATENOLOL 25 MG: 25 TABLET ORAL at 14:21

## 2019-09-04 RX ADMIN — LABETALOL 20 MG/4 ML (5 MG/ML) INTRAVENOUS SYRINGE 20 MG: at 08:43

## 2019-09-04 RX ADMIN — ALLOPURINOL 100 MG: 100 TABLET ORAL at 14:21

## 2019-09-04 RX ADMIN — LISINOPRIL 20 MG: 10 TABLET ORAL at 09:03

## 2019-09-04 RX ADMIN — CLOPIDOGREL BISULFATE 75 MG: 75 TABLET ORAL at 09:26

## 2019-09-04 RX ADMIN — PENTOXIFYLLINE 400 MG: 400 TABLET, EXTENDED RELEASE ORAL at 16:42

## 2019-09-04 RX ADMIN — IOHEXOL 85 ML: 350 INJECTION, SOLUTION INTRAVENOUS at 08:23

## 2019-09-04 RX ADMIN — HEPARIN SODIUM 5000 UNITS: 5000 INJECTION INTRAVENOUS; SUBCUTANEOUS at 21:27

## 2019-09-04 RX ADMIN — PRAVASTATIN SODIUM 80 MG: 20 TABLET ORAL at 16:41

## 2019-09-04 RX ADMIN — CLOPIDOGREL BISULFATE 75 MG: 75 TABLET ORAL at 14:21

## 2019-09-04 NOTE — NURSING NOTE
Patient received from ER to ICU bed 2 dx TIA  States she was at home on her couch and tried to get up but "my right leg wasn't moving", so she "fell back onto the couch " Denies hitting head  Fully awake, alert and oriented x4 at this time  Denies any pain or discomfort  NIH score a 1 at this time, drift noted on right leg  Denies any numbness or tingling of extremities, equal grasp B/L hands  NSR on monitor with an occasional PAC  Oriented to room and alert system, call bell in reach

## 2019-09-04 NOTE — H&P
H&P- Cassandra Maddox 1940, 78 y o  female MRN: [id number]    Unit/Bed#: ICU 02 Encounter: [id number]    Primary Care Provider: Sanjay Stock DO   Date and time admitted to hospital: 9/4/2019  8:05 AM        * TIA (transient ischemic attack)  Assessment & Plan  · Patient with history of multiple CVA/TIA in the past  · Stroke code called in the ER, CT head and CTA head and neck negative for any acute findings  · Patient is not a tPA candidate  · Will monitor in the ICU  · Check MRI of the brain  · Neurology consult  · PT/OT/speech eval  · Continue Plavix/statin    Mixed hyperlipidemia  Assessment & Plan  · Continue statin    Hypertension  Assessment & Plan  · BP elevated on admission  · Patient received labetalol in the ER  · Will allow for permissive hypertension given suspected CVA  · Resume home meds tomorrow    Gout  Assessment & Plan  · Continue allopurinol      VTE Prophylaxis: Heparin  Code Status:  DNR  POLST: There is no POLST form on file for this patient (pre-hospital)  Discussion with family:  No family at bedside during my evaluation    Anticipated Length of Stay:  Patient will be admitted on an Inpatient basis with an anticipated length of stay of  > 2 midnights  Justification for Hospital Stay:  Suspected CVA    Chief Complaint:   Right-sided weakness    History of Present Illness: Cassandra Maddox is a 78 y o  female who presents with right-sided weakness  Patient states that she woke up this morning unable to get up on her own which is unusual for her  Does have a history of CVAs however does not have any history of residual weakness and states she is able to ambulate without any assist devices  Patient was concerned when she was unable to get up and then noticed that she was experiencing right-sided weakness  Patient pressed her Life Alert and was brought into the ER by EMS  In the ER NIH stroke scale was 3 and patient was not a candidate for tPA per Neurology evaluation    CT head and CTA head and neck were negative for any acute findings  Patient was noted to have some chronic findings of previous CVAs  Review of Systems:  Review of Systems   Constitutional: Positive for activity change  Respiratory: Negative for shortness of breath  Cardiovascular: Negative for chest pain  Gastrointestinal: Negative for abdominal pain  Genitourinary: Negative for dysuria  Neurological: Positive for dizziness and weakness  All other systems reviewed and are negative  Past Medical and Surgical History:   Past Medical History:   Diagnosis Date    Chronic kidney disease     Dementia     Gout     Hypertension     Osteoporosis        Past Surgical History:   Procedure Laterality Date    APPENDECTOMY      HYSTERECTOMY      TUBAL LIGATION      UTERINE FIBROID SURGERY         Meds/Allergies:  Prior to Admission medications    Medication Sig Start Date End Date Taking?  Authorizing Provider   alendronate (FOSAMAX) 70 mg tablet Take 1 tablet (70 mg total) by mouth once a week 8/13/19  Yes Terry Main, DO   allopurinol (ZYLOPRIM) 100 mg tablet Take 1 tablet (100 mg total) by mouth daily 8/13/19  Yes Terry Main DO   atenolol (TENORMIN) 25 mg tablet Take 1 tablet (25 mg total) by mouth daily 8/13/19  Yes Terry Main DO   clopidogrel (PLAVIX) 75 mg tablet Take 1 tablet (75 mg total) by mouth daily 8/13/19  Yes Terry Main DO   hydrochlorothiazide (HYDRODIURIL) 50 mg tablet Take 1 tablet (50 mg total) by mouth daily 8/13/19  Yes Terry Main DO   lisinopril (ZESTRIL) 40 mg tablet Take 1 tablet (40 mg total) by mouth daily 8/13/19  Yes Terry Main, DO   Multiple Vitamins-Minerals (MULTI ADULT GUMMIES) CHEW Chew 1 tablet daily 11/10/16  Yes Historical Provider, MD   OMEGA-3 FATTY ACIDS-VITAMIN E PO Take 1,000 mg by mouth 3/27/14  Yes Historical Provider, MD   pentoxifylline (TRENtal) 400 mg ER tablet Take 1 tablet (400 mg total) by mouth 3 (three) times a day with meals 8/13/19 Yes Peter Centeno DO   simvastatin (ZOCOR) 40 mg tablet Take 1 tablet (40 mg total) by mouth daily at bedtime 8/13/19  Yes Peter Centeno DO   donepezil (ARICEPT) 5 mg tablet Take 1 tablet (5 mg total) by mouth daily at bedtime  Patient not taking: Reported on 9/4/2019 3/4/19   Peter Centeno DO     I have reviewed home medications with patient personally  Allergies: Allergies   Allergen Reactions    Oxycodone-Acetaminophen Rash       Social History:  Marital Status:    Occupation:  None  Patient Pre-hospital Living Situation:  Lives with son  Patient Pre-hospital Level of Mobility:  Ambulatory without assistive devices  Patient Pre-hospital Diet Restrictions:  None  Substance Use History:     Social History     Substance and Sexual Activity   Alcohol Use No    Frequency: Never    Drinks per session: Patient refused    Binge frequency: Never     Social History     Tobacco Use   Smoking Status Former Smoker   Smokeless Tobacco Never Used     Social History     Substance and Sexual Activity   Drug Use No       Family History: Mother with history of CVA    Physical Exam:   Vitals:   Blood Pressure: 152/68 (09/04/19 1200)  Pulse: 63 (09/04/19 1200)  Temperature: 98 5 °F (36 9 °C) (09/04/19 1030)  Temp Source: Tympanic (09/04/19 1030)  Respirations: 21 (09/04/19 1200)  Height: 5' 4" (162 6 cm) (09/04/19 1030)  Weight - Scale: 68 kg (150 lb) (09/04/19 0805)  SpO2: 90 % (09/04/19 1200)    Physical Exam   Constitutional: No distress  Frail elderly female   HENT:   Head: Normocephalic and atraumatic  Eyes: Conjunctivae and EOM are normal    Neck: Normal range of motion  Neck supple  Cardiovascular: Normal rate and regular rhythm  Pulmonary/Chest: Effort normal  No respiratory distress  Abdominal: Soft  She exhibits no distension  There is no tenderness     Musculoskeletal:   Patient is moving all 4 extremities however does have a noticeable right lower extremity weakness   Neurological: She is alert  No cranial nerve deficit  Coordination normal    Right-sided weakness noted   Skin: Skin is warm and dry  Psychiatric: She has a normal mood and affect  Her behavior is normal        Additional Data:   Lab Results: I have personally reviewed pertinent reports  Results from last 7 days   Lab Units 09/04/19  0812   WBC Thousand/uL 8 90   HEMOGLOBIN g/dL 12 4   HEMATOCRIT % 38 2*   PLATELETS Thousands/uL 362     Results from last 7 days   Lab Units 09/04/19  0812   POTASSIUM mmol/L 3 6   CHLORIDE mmol/L 101   CO2 mmol/L 30   BUN mg/dL 22   CREATININE mg/dL 1 16   CALCIUM mg/dL 9 2     Results from last 7 days   Lab Units 09/04/19  0812   INR  1 08               Imaging: I have personally reviewed pertinent reports  X-ray chest 1 view portable   Final Result by Chaya Lozada MD (09/04 1642)      Interstitial lung disease  Workstation performed: [id number]         CTA stroke alert (head/neck)   Final Result by Carol Elizabeth MD (09/04 8011)      Mild cervical ICA origin stenosis due to atherosclerotic disease  Moderate focal stenosis in the left cavernous ICA due to atherosclerotic plaque  Patent anterior and posterior intracranial circulation  Interstitial edema biapical paraseptal emphysematous disease with question of underlying chronic lung disease  Findings were directly discussed with Renuka West on 9/4/2019 8:41 AM                      Workstation performed: [id number]         CT stroke alert brain   Final Result by Carol Elizabeth MD (09/04 3135)      No acute intracranial hemorrhage  Chronic biparietal cortical infarctions  Chronic lacunar infarction within the left basal ganglia extending into the corona radiata  Generalized atrophy and mild cerebral chronic microangiopathic disease with intracranial vertebral and carotid atherosclerotic disease        Findings were directly discussed with Dr Miranda Meraz of the ED on 9/4/2019 8:24 AM  Workstation performed: [id number]         MRI inpatient order    (Results Pending)       NetAccess/Epic Records Reviewed: Yes     ** Please Note: This note has been constructed using a voice recognition system   **

## 2019-09-04 NOTE — PHYSICAL THERAPY NOTE
Physical Therapy Evaluation     Patient's Name: Mary Gutierrez    Admitting Diagnosis  TIA (transient ischemic attack) [G45 9]  Hypertensive crisis [I16 9]  Weakness [R53 1]    Problem List  Patient Active Problem List   Diagnosis    CKD (chronic kidney disease), stage III ([facility] )    Gout    Osteoporosis    Hypertension    Mixed hyperlipidemia    Cerebrovascular disease, arteriosclerotic, post-stroke    Peripheral vascular disease ([facility] )    Polymyalgia rheumatica ([facility] )    TIA (transient ischemic attack)    Intermittent claudication ([facility] )       Past Medical History  Past Medical History:   Diagnosis Date    Chronic kidney disease     Dementia     Gout     Hypertension     Osteoporosis        Past Surgical History  Past Surgical History:   Procedure Laterality Date    APPENDECTOMY      HYSTERECTOMY      TUBAL LIGATION      UTERINE FIBROID SURGERY          09/04/19 1322   Note Type   Note type Eval only   Pain Assessment   Pain Assessment No/denies pain   Pain Score No Pain   Home Living   Type of Home House   Home Layout Multi-level;Performs ADLs on one level; Able to live on main level with bedroom/bathroom;Stairs to enter with rails  (2 JENNIFER)   Bathroom Shower/Tub   (sponge baths only)   Bathroom Toilet Standard   Bathroom Accessibility Accessible   Home Equipment Life alert  (PTA, pt did not utilize an AD )   Prior Function   Level of [place] Independent with ADLs and functional mobility; Needs assistance with IADLs   Lives With Son;Family   Receives Help From Family   ADL Assistance Independent   IADLs Needs assistance  (transportation)   Falls in the last 6 months 1 to 4  (x 1 on 09/02/2019)   Vocational Retired   Restrictions/Precautions   [facility] Bearing Precautions Per Order No   Other Precautions Fall Risk;Telemetry;Multiple lines   General   Family/Caregiver Present No   Cognition   Overall Cognitive Status WFL   Arousal/Participation Alert   Orientation Level Oriented X4   Memory Within functional limits   Following Commands Follows one step commands without difficulty   RUE Assessment   RUE Assessment   (see OT assessment)   LUE Assessment   LUE Assessment   (see OT assessment)   RLE Assessment   RLE Assessment X   Strength RLE   R Hip Flexion 3/5   R Knee Extension 2+/5   R Ankle Dorsiflexion 2/5   R Ankle Plantar Flexion 2/5   LLE Assessment   LLE Assessment X   Strength LLE   L Hip Flexion 3+/5   L Knee Extension 3+/5   L Ankle Dorsiflexion 3+/5   Coordination   Movements are Fluid and Coordinated 0   Coordination and Movement Description Incremental mobility requiring increased time & tactile cues of 1-2 throughout session  Light Touch   RLE Light Touch Grossly intact   LLE Light Touch Grossly intact   Sharp/Dull   RLE Sharp/Dull Grossly intact   LLE Sharp/Dull Grossly intact   Bed Mobility   Supine to Sit 4  Minimal assistance   Additional items Assist x 1;HOB elevated; Bedrails; Increased time required;Verbal cues;LE management   Sit to Supine 3  Moderate assistance   Additional items Assist x 2;Bedrails; Increased time required;Verbal cues;LE management   Transfers   Sit to Stand 3  Moderate assistance   Additional items Assist x 2;Bedrails; Increased time required;Verbal cues   Stand to Sit 3  Moderate assistance   Additional items Assist x 2;Bedrails; Increased time required;Verbal cues   Stand pivot Unable to assess  (2/2 safety concerns&inability to maintain standing >10 sec  s)   Ambulation/Elevation   Gait pattern Not tested  (at this time 2/2 safety concerns, medical complications)   Balance   Static Sitting Fair +   Dynamic Sitting Fair   Static Standing Poor   Dynamic Standing   (unable to assess)   Endurance Deficit   Endurance Deficit Yes   Endurance Deficit Description /70->190/90 upon positional change  POX 90%->87% on RA  HR 68->141->91   Activity Tolerance   Activity Tolerance Treatment limited secondary to medical complications (Comment)   Nurse Made Aware yes,Dorothea RAMIRES Assessment   Prognosis Good   Problem List Decreased strength;Decreased endurance; Impaired balance;Decreased mobility; Decreased coordination; Impaired judgement;Decreased safety awareness   Assessment Pt is 78 y o  female seen for PT evaluation s/p admit to [facility] ICU on 9/4/2019 w/ TIA (transient ischemic attack)  PT consulted to assess pt's functional mobility and d/c needs  Order placed for PT eval and tx, w/ eval and treat order  Comorbidities affecting pt's physical performance at time of assessment include: R sided weakness, TIA, HTN, CKD, PVD, polymyalgia rheumatica  PTA, pt was independent w/ all functional mobility w/ o AD  Personal factors affecting pt at time of IE include: stairs to enter home, inability to ambulate household distances, inability to navigate community distances, unable to perform dynamic tasks in community, positive fall history, unable to perform physical activity, limited insight into impairments and inability to perform ADLs  Please find objective findings from PT assessment regarding body systems outlined above with impairments and limitations including weakness, impaired balance, decreased endurance, impaired coordination, gait deviations, decreased activity tolerance, decreased safety awareness, impaired judgement and fall risk  The following objective measures performed on IE also reveal limitations: Barthel Index: 30/100  Pt's clinical presentation is currently unstable/unpredictable  Pt to benefit from continued PT tx to address deficits as defined above and maximize level of functional independent mobility and consistency  From PT/mobility standpoint, recommendation at time of d/c would be STR pending progress in order to facilitate return to PLOF     Barriers to Discharge Inaccessible home environment   Barriers to Discharge Comments JENNIFER home   Goals   Patient Goals to know why this keeps happening to her   LTG Expiration Date 09/14/19   Long Term Goal #1 1  )Patient will complete bed mobility with supervision of 1 for decrease need for caregiver assistance, decrease burden of care  2 ) Patient will complete transfers with min  A of 1 to decrease risk of falls, facilitate upright standing posture  3 ) RLE strength to greater than/equal to 3+/5 gross musculature to increase ability to safely transfer, control descent to chair  4 ) Patient will exhibit increase static standing balance to Fair , for 30-45 seconds without LOB and min A  of 1 to improve activity tolerance  5 ) Patient will exhibit increase dynamic ambulatory balance to Fair+ for  feet w/AD min A of 1 to improve ability to mobilize to toilet, chair and decrease risk for additional medical complications  6 ) Patient will exhibit good self monitoring and ability to follow 2 step commands to increase complexity of tasks and resume ADL's without LOB  Treatment Day 0   Plan   Treatment/Interventions Functional transfer training;LE strengthening/ROM; Therapeutic exercise; Endurance training;Equipment eval/education;Patient/family training;Bed mobility;Gait training;Spoke to nursing;OT  (&neuro re-education w/balance training)   PT Frequency 5x/wk; Other (Comment)  (1x/weekend)   Recommendation   Recommendation Short-term skilled PT   Equipment Recommended   (TBD)   PT - OK to Discharge No   Additional Comments Upon conclusion, pt  was resting in bed w/SCD's active & all needs within reach     Barthel Index   Feeding 5   Bathing 0   Grooming Score 0   Dressing Score 5   Bladder Score 5   Bowels Score 10   Toilet Use Score 0   Transfers (Bed/Chair) Score 5   Mobility (Level Surface) Score 0   Stairs Score 0   Barthel Index Score 30     Kunal Wolf, PT

## 2019-09-04 NOTE — QUICK NOTE
Stroke alert 08:50 am   I spoke with ED physician Dr Venkata Garsia  Patient is 77 y/o female with history of CKD and HTN presents for dizziness and weakness upon awakening this morning  Upon arrival to ED, she is noted to have right arm and leg weakness, with no involvement of face or speech  NIHSS 3   CT head is normal and CTA shows no large vessel occlusion  Vitals:    09/04/19 0805   BP: (!) 220/98   Pulse: 70   Resp: 20   Temp: 97 7 °F (36 5 °C)   SpO2: 96%       Assessment:  Right sided weakness, hypertensive emergency with or without acute lacunar infarct    Plan:  Admit for stroke work-up  Reduce BP slowly, target is 180-200 in first 6 hours, and then can be gradually brought to normotension  tPA not given: outside window, low NIHSS and BP above parameters  Continue home Plavix  Add high intensity statin      We will follow

## 2019-09-04 NOTE — PLAN OF CARE
Problem: Prexisting or High Potential for Compromised Skin Integrity  Goal: Skin integrity is maintained or improved  Description  INTERVENTIONS:  - Identify patients at risk for skin breakdown  - Assess and monitor skin integrity  - Assess and monitor nutrition and hydration status  - Monitor labs   - Assess for incontinence   - Turn and reposition patient  - Assist with mobility/ambulation  - Relieve pressure over bony prominences  - Avoid friction and shearing  - Provide appropriate hygiene as needed including keeping skin clean and dry  - Evaluate need for skin moisturizer/barrier cream  - Collaborate with interdisciplinary team   - Patient/family teaching  - Consider wound care consult   Outcome: Progressing

## 2019-09-04 NOTE — ASSESSMENT & PLAN NOTE
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

## 2019-09-04 NOTE — PLAN OF CARE
Problem: PHYSICAL THERAPY ADULT  Goal: Performs mobility at highest level of function for planned discharge setting  See evaluation for individualized goals  Description  Treatment/Interventions: Functional transfer training, LE strengthening/ROM, Therapeutic exercise, Endurance training, Equipment eval/education, Patient/family training, Bed mobility, Gait training, Spoke to nursing, OT(&neuro re-education w/balance training)  Equipment Recommended: (TBD)    See flowsheet documentation for full assessment, interventions and recommendations  Sidney Wells PT     Note:   Prognosis: Good  Problem List: Decreased strength, Decreased endurance, Impaired balance, Decreased mobility, Decreased coordination, Impaired judgement, Decreased safety awareness  Assessment: Pt is 78 y o  female seen for PT evaluation s/p admit to [facility] ICU on 9/4/2019 w/ TIA (transient ischemic attack)  PT consulted to assess pt's functional mobility and d/c needs  Order placed for PT eval and tx, w/ eval and treat order  Comorbidities affecting pt's physical performance at time of assessment include: R sided weakness, TIA, HTN, CKD, PVD, polymyalgia rheumatica  PTA, pt was independent w/ all functional mobility w/ o AD  Personal factors affecting pt at time of IE include: stairs to enter home, inability to ambulate household distances, inability to navigate community distances, unable to perform dynamic tasks in community, positive fall history, unable to perform physical activity, limited insight into impairments and inability to perform ADLs  Please find objective findings from PT assessment regarding body systems outlined above with impairments and limitations including weakness, impaired balance, decreased endurance, impaired coordination, gait deviations, decreased activity tolerance, decreased safety awareness, impaired judgement and fall risk   The following objective measures performed on IE also reveal limitations: Barthel Index: 30/100  Pt's clinical presentation is currently unstable/unpredictable  Pt to benefit from continued PT tx to address deficits as defined above and maximize level of functional independent mobility and consistency  From PT/mobility standpoint, recommendation at time of d/c would be STR pending progress in order to facilitate return to PLOF  Barriers to Discharge: Inaccessible home environment  Barriers to Discharge Comments: JENNIFER home  Recommendation: Short-term skilled PT     PT - OK to Discharge: No    See flowsheet documentation for full assessment     Karlie Abt, PT

## 2019-09-04 NOTE — OCCUPATIONAL THERAPY NOTE
Occupational Therapy Evaluation      Ashley Stanley    9/4/2019    Patient Active Problem List   Diagnosis    CKD (chronic kidney disease), stage III ([facility] [address] )    Gout    Osteoporosis    Hypertension    Mixed hyperlipidemia    Cerebrovascular disease, arteriosclerotic, post-stroke    [facility]ipheral vascular disease ([email or web address] )    [facility]    TIA (transient ischemic attack)    Intermittent claudication ([email or web address] )       Past Medical History:   Diagnosis Date    Chronic kidney disease     Dementia     Gout     Hypertension     Osteoporosis        Past Surgical History:   Procedure Laterality Date    APPENDECTOMY      HYSTERECTOMY      TUBAL LIGATION      UTERINE FIBROID SURGERY        09/04/19 1341   Note Type   Note type Eval only   Restrictions/Precautions   Weight Bearing Precautions Per Order No   Other Precautions Fall Risk;Telemetry;Multiple lines   Pain Assessment   Pain Assessment No/denies pain   Pain Score No Pain   Home Living   Type of Home House   Home Layout Multi-level; Able to live on main level with bedroom/bathroom; Performs ADLs on one level;Stairs to enter with rails  (1 JENNIFER)   Bathroom Shower/Tub   (Sponge bathes at baseline )   [address]   (no DME at baseline )   [facility] alert  (no AD used at baseline )   Prior Function   Level of [place] Independent with ADLs and functional mobility; Needs assistance with IADLs   Lives With Son;Family   Receives Help From Family   ADL Assistance Independent   IADLs Needs assistance  (transportation)   Falls in the last 6 months 1 to 4  (x 1 on 09/02/2019)   Vocational Retired   Comments Pt does cooking, cleaning, and laundry for family    Lifestyle   Autonomy Patient reporting independent with ADLs/IADLs, ambulatory with no AD and lives with family in a multi-level house with 1 JENNIFER   [facility] [address] enjoys playing with dog    ADL   Eating Assistance 5  Supervision/Setup   Grooming Assistance 5  Supervision/Setup   UB Bathing Assistance 4  Minimal Assistance   LB Bathing Assistance 3  Moderate Assistance   UB Dressing Assistance 4  Minimal Assistance   LB Dressing Assistance 3  Moderate [address]  3  Moderate Assistance   Bed Mobility   Supine to Sit 4  Minimal assistance   Additional items Assist x 1;HOB elevated; Bedrails; Increased time required;Verbal cues   Sit to Supine 3  Moderate assistance   Additional items Assist x 2;Bedrails; Increased time required;Verbal cues;LE management   Transfers   Sit to Stand 3  Moderate assistance   Additional items Assist x 2;Bedrails; Increased time required;Verbal cues   Stand to Sit 3  Moderate assistance   Additional items Assist x 2;Bedrails; Increased time required;Verbal cues   Additional Comments Further functional mobility was deferred at this time due to safety concerns and pt unable to stand >10 secs due to weakness in R leg   Balance   Static Sitting Fair +   Dynamic Sitting Fair   Static Standing Poor   Activity Tolerance   Activity Tolerance Treatment limited secondary to medical complications (Comment)   Nurse Made Aware KEYLA Piedra verbalized pt appropriate for therapy and made aware of outcomes    RUE Assessment   RUE Assessment WFL  (3+/5)   LUE Assessment   LUE Assessment   (3+/5)   Hand Function   Gross Motor Coordination Functional   Fine Motor Coordination Functional   Sensation   Light Touch No apparent deficits  (per pt report )   Vision-Basic Assessment   Current Vision Wears glasses all the time   Vision - Complex Assessment   Acuity Able to read clock/calendar on wall without difficulty   Cognition   Overall Cognitive Status [facility]   Arousal/Participation Alert; Cooperative   Attention Attends with cues to redirect   Orientation Level Oriented X4   Memory Within functional limits   Following Commands Follows one step commands with increased time or repetition   Comments Mini-Cog assessment performed today  Version 1 was given  Patient able to recall 0/3 words  Patient able to draw an abnormal clock with the incorrect time  Total score of test was 1/5 indicating  further screening of cognition is recommended  Cognition Assessment Tools   (mini-cog )   Score 1   Assessment   Limitation Decreased ADL status; Decreased UE strength;Decreased Safe judgement during ADL;Decreased cognition;Decreased endurance;Decreased self-care trans;Decreased high-level ADLs   Assessment Pt is a 78 y o  female who was admitted to [address] [place] [address] on 9/4/2019 with TIA (transient ischemic attack)  At this time, patient is also affected by the comorbidities of: gout, HTN, Osteoporosis, CKD, dementia, hyperlipidemia and hx of CVA  Additionally, patient is affected by the following personal factors:steps to enter environment, difficulty performing ADLS and difficulty performing IADLS   Orders placed for OT evaluation/treatment with up and OOB as tolerated orders  Prior to admission, Patient reporting independent with ADLs/IADLs, ambulatory with no AD and lives with family in a multi-level house with 1 JENNIFER  Upon OT evaluation, patient requires minimum assist for UB ADLs and moderate assist for LB ADLs  Occupational performance is affected by the following deficits: decreased strength, decreased balance, decreased tolerance, impaired memory, impaired sequencing, impaired problem solving and decreased safety awareness  Based on the following information, patient would benefit from continued skilled OT treatment while in the hospital to address deficits and maximize level of functional independence with ADL's and functional mobility  Occupational Performance areas to address include: eating, grooming, bathing/shower, toilet hygiene, dressing, functional mobility, clothing management, meal prep and household maintenance   From OT standpoint, recommendation at time of d/c would be short term rehab  Goals   Patient Goals to eat lunch    Plan   Treatment Interventions ADL retraining;Functional transfer training;UE strengthening/ROM; Endurance training;Cognitive reorientation;Patient/family training; Compensatory technique education; Neuromuscular reeducation;Continued evaluation   Goal Expiration Date 09/14/19   Treatment Day 0   OT Frequency 3-5x/wk   Recommendation   OT Discharge Recommendation Short Term Rehab   OT - OK to Discharge   (Once medically cleared )   Barthel Index   Feeding 5   Bathing 0   Grooming Score 0   Dressing Score 5   Bladder Score 5   Bowels Score 10   Toilet Use Score 0   Transfers (Bed/Chair) Score 5   Mobility (Level Surface) Score 0   Stairs Score 0   Barthel Index Score 30     GOALS:    Pt will achieve the following within specified time frame: STG  Pt will achieve the following goals within 5 days    *ADL transfers with Min (A) for inc'd independence with ADLs/purposeful tasks    *UB ADL with (S) for inc'd independence with self cares    *LB ADL with (S) using AE prn for inc'd independence with self cares    *Toileting with (S) for clothing management and hygiene for return to PLOF with personal care    *Increase static stand balance to fair+ and dyn stand balance to fair for inc'd safety with standing purposeful tasks    *Increase stand tolerance x5 m for inc'd tolerance with standing purposeful tasks    *Participate in 10m UE therex to increase overall stamina/activity tolerance for purposeful tasks    *Bed mobility- (S) for inc'd independence to manage own comfort and initiate EOB & OOB purposeful tasks    *Patient will verbalize 3 safety awareness/ principles to prevent falls in the home setting         Pt will achieve the following within specified time frame: LTG  Pt will achieve the following goals within 10 days    *ADL transfers with (I) for inc'd independence with ADLs/purposeful tasks    *Self Feeding- (I) for inc'd independence with providing self nourishment    *UB ADL with (I) for inc'd independence with self cares    *LB ADL with (S) using AE prn for inc'd independence with self cares    *Toileting with (I) for clothing management and hygiene for return to PLOF with personal care    *Increase static stand balance to good and dyn stand balance to fair+ for inc'd safety with standing purposeful tasks    *Increase stand tolerance x10 m for inc'd tolerance with standing purposeful tasks    *Bed mobility- (I) for inc'd independence to manage own comfort and initiate EOB & OOB purposeful tasks    *Patient will increase OOB/sitting tolerance to 2-4 hours per day to increase participation in self-care and leisure tasks with no s/s of exertion           Brigid Snow MS, OTR/L

## 2019-09-04 NOTE — PLAN OF CARE
Problem: OCCUPATIONAL THERAPY ADULT  Goal: Performs self-care activities at highest level of function for planned discharge setting  See evaluation for individualized goals  Description  Treatment Interventions: ADL retraining, Functional transfer training, UE strengthening/ROM, Endurance training, Cognitive reorientation, Patient/family training, Compensatory technique education, Neuromuscular reeducation, Continued evaluation          See flowsheet documentation for full assessment, interventions and recommendations  Note:   Limitation: Decreased ADL status, Decreased UE strength, Decreased Safe judgement during ADL, Decreased cognition, Decreased endurance, Decreased self-care trans, Decreased high-level ADLs     Assessment: Pt is a 78 y o  female who was admitted to [address] [place] [address] on 9/4/2019 with TIA (transient ischemic attack)  At this time, patient is also affected by the comorbidities of: gout, HTN, Osteoporosis, CKD, dementia, hyperlipidemia and hx of CVA  Additionally, patient is affected by the following personal factors:steps to enter environment, difficulty performing ADLS and difficulty performing IADLS   Orders placed for OT evaluation/treatment with up and OOB as tolerated orders  Prior to admission, Patient reporting independent with ADLs/IADLs, ambulatory with no AD and lives with family in a multi-level house with 1 [facility]  Upon OT evaluation, patient requires minimum assist for UB ADLs and moderate assist for LB ADLs  Occupational performance is affected by the following deficits: decreased strength, decreased balance, decreased tolerance, impaired memory, impaired sequencing, impaired problem solving and decreased safety awareness  Based on the following information, patient would benefit from continued skilled OT treatment while in the hospital to address deficits and maximize level of functional independence with ADL's and functional mobility   Occupational Performance areas to address include: eating, grooming, bathing/shower, toilet hygiene, dressing, functional mobility, clothing management, meal prep and household maintenance  From OT standpoint, recommendation at time of d/c would be short term rehab       OT Discharge Recommendation: Short Term Rehab  OT - OK to Discharge: (Once medically cleared )     Alec Wiggins OT

## 2019-09-04 NOTE — ED PROVIDER NOTES
History  Chief Complaint   Patient presents with    Weakness - Generalized     partient called EMS for fall and inability to get up  used life alert  Upon arrival to ED patient was no longer able to  move right arm or leg  Patient has no facial deficit or aphasia  Stroke alert called at this time  [facility] STROKE Alert     78years old female with past medical history of hypertension and TIA who was brought to the emergency room by ambulance because she was unable to get up having dizziness and weakness  In emergency room patient developed right-sided weakness including the right leg and right arm  She has no sensation abnormality no visual problem no difficulty speaking or swallowing  History provided by:  Patient   used: No    STROKE Alert   Severity:  Unable to specify  Duration:  1 hour  Chronicity:  New  Associated symptoms: fatigue and myalgias    Associated symptoms: no abdominal pain, no chest pain, no cough, no diarrhea, no fever, no headaches, no nausea, no rash, no rhinorrhea, no shortness of breath, no sore throat and no vomiting    CVA/TIA-like Symptoms   Presenting symptoms: weakness    Presenting symptoms: no confusion and no headaches    Associated symptoms: no chest pain, no dizziness, no fever, no nausea, no seizures and no vomiting        Prior to Admission Medications   Prescriptions Last Dose Informant Patient Reported? Taking?    Multiple Vitamins-Minerals (MULTI ADULT GUMMIES) CHEW 9/3/2019 at Unknown time  Yes Yes   Sig: Chew 1 tablet daily   OMEGA-3 FATTY ACIDS-VITAMIN E PO 9/3/2019 at Unknown time  Yes Yes   Sig: Take 1,000 mg by mouth   alendronate (FOSAMAX) 70 mg tablet 8/26/2019 at Unknown time  No Yes   Sig: Take 1 tablet (70 mg total) by mouth once a week   allopurinol (ZYLOPRIM) 100 mg tablet 9/3/2019 at Unknown time  No Yes   Sig: Take 1 tablet (100 mg total) by mouth daily   atenolol (TENORMIN) 25 mg tablet 9/3/2019 at Unknown time  No Yes   Sig: Take 1 tablet (25 mg total) by mouth daily   clopidogrel (PLAVIX) 75 mg tablet 9/3/2019 at Unknown time  No Yes   Sig: Take 1 tablet (75 mg total) by mouth daily   donepezil (ARICEPT) 5 mg tablet Not Taking at Unknown time  No No   Sig: Take 1 tablet (5 mg total) by mouth daily at bedtime   Patient not taking: Reported on 9/4/2019   hydrochlorothiazide (HYDRODIURIL) 50 mg tablet 9/3/2019 at Unknown time  No Yes   Sig: Take 1 tablet (50 mg total) by mouth daily   lisinopril (ZESTRIL) 40 mg tablet 9/3/2019 at Unknown time  No Yes   Sig: Take 1 tablet (40 mg total) by mouth daily   pentoxifylline (TRENtal) 400 mg ER tablet 9/3/2019 at Unknown time  No Yes   Sig: Take 1 tablet (400 mg total) by mouth 3 (three) times a day with meals   simvastatin (ZOCOR) 40 mg tablet 9/3/2019 at Unknown time  No Yes   Sig: Take 1 tablet (40 mg total) by mouth daily at bedtime      Facility-Administered Medications: None       Past Medical History:   Diagnosis Date    Chronic kidney disease     Dementia     Gout     Hypertension     Osteoporosis        Past Surgical History:   Procedure Laterality Date    APPENDECTOMY      HYSTERECTOMY      TUBAL LIGATION      UTERINE FIBROID SURGERY         Family History   Problem Relation Age of Onset    Stroke Mother     Other Father         Coal workers' pneumoconiosis    Hypertension Father      I have reviewed and agree with the history as documented  Social History     Tobacco Use    Smoking status: Former Smoker    Smokeless tobacco: Never Used   Substance Use Topics    Alcohol use: No     Frequency: Never     Drinks per session: Patient refused     Binge frequency: Never    Drug use: No        Review of Systems   Constitutional: Positive for fatigue  Negative for chills and fever  HENT: Negative  Negative for rhinorrhea and sore throat  Eyes: Negative  Negative for visual disturbance  Respiratory: Negative  Negative for cough and shortness of breath      Cardiovascular: Negative for chest pain and leg swelling  Gastrointestinal: Negative for abdominal pain, diarrhea, nausea and vomiting  Endocrine: Negative  Genitourinary: Negative  Negative for dysuria  Musculoskeletal: Positive for myalgias  Negative for back pain  Skin: Negative for rash  Allergic/Immunologic: Negative  Neurological: Positive for tremors, weakness and light-headedness  Negative for dizziness, seizures, syncope, facial asymmetry, speech difficulty, numbness and headaches  Psychiatric/Behavioral: Negative  Negative for confusion  All other systems reviewed and are negative  Physical Exam  Physical Exam   Constitutional: She is oriented to person, place, and time  Neurological: She is alert and oriented to person, place, and time  She displays normal reflexes  No cranial nerve deficit or sensory deficit  She exhibits normal muscle tone  Coordination and gait abnormal  GCS eye subscore is 4  GCS verbal subscore is 5  GCS motor subscore is 6         Vital Signs  ED Triage Vitals [09/04/19 0805]   Temperature Pulse Respirations Blood Pressure SpO2   97 7 °F (36 5 °C) 70 20 (!) 220/98 96 %      Temp Source Heart Rate Source Patient Position - Orthostatic VS BP Location FiO2 (%)   Temporal Monitor Lying Left arm --      Pain Score       No Pain           Vitals:    09/04/19 1300 09/04/19 1400 09/04/19 1500 09/04/19 1600   BP: 170/72 (!) 190/79 (!) 182/77 157/67   Pulse: 65 62 60 67   Patient Position - Orthostatic VS: Lying   Lying         Visual Acuity  Visual Acuity      Most Recent Value   L Pupil Size (mm)  4   R Pupil Size (mm)  4   L Pupil Shape  Round   R Pupil Shape  Round          ED Medications  Medications   allopurinol (ZYLOPRIM) tablet 100 mg (100 mg Oral Given 9/4/19 1421)   atenolol (TENORMIN) tablet 25 mg (25 mg Oral Given 9/4/19 1421)   clopidogrel (PLAVIX) tablet 75 mg (75 mg Oral Given 9/4/19 1421)   lisinopril (ZESTRIL) tablet 40 mg (has no administration in time range) pentoxifylline (TRENtal) ER tablet 400 mg (has no administration in time range)   pravastatin (PRAVACHOL) tablet 80 mg (has no administration in time range)   ondansetron (ZOFRAN) injection 4 mg (has no administration in time range)   heparin (porcine) subcutaneous injection 5,000 Units (5,000 Units Subcutaneous Given 9/4/19 1421)   acetaminophen (TYLENOL) tablet 650 mg (has no administration in time range)   Labetalol HCl (NORMODYNE) injection 20 mg (20 mg Intravenous Given 9/4/19 0843)   iohexol (OMNIPAQUE) 350 MG/ML injection (MULTI-DOSE) 85 mL (85 mL Intravenous Given 9/4/19 0823)   lisinopril (ZESTRIL) tablet 20 mg (20 mg Oral Given 9/4/19 0903)   clopidogrel (PLAVIX) tablet 75 mg (75 mg Oral Given 9/4/19 0926)       Diagnostic Studies  Results Reviewed     Procedure Component Value Units Date/Time    Basic metabolic panel [[id number]] Collected:  09/04/19 0812    Lab Status:  Final result Specimen:  Blood from Arm, Left Updated:  09/04/19 0839     Sodium 139 mmol/L      Potassium 3 6 mmol/L      Chloride 101 mmol/L      CO2 30 mmol/L      ANION GAP 8 mmol/L      BUN 22 mg/dL      Creatinine 1 16 mg/dL      Glucose 99 mg/dL      Calcium 9 2 mg/dL      eGFR 45 ml/min/1 73sq m     Narrative:       [place] guidelines for Chronic Kidney Disease (CKD):     Stage 1 with normal or high GFR (GFR > 90 mL/min/1 73 square meters)    Stage 2 Mild CKD (GFR = 60-89 mL/min/1 73 square meters)    Stage 3A Moderate CKD (GFR = 45-59 mL/min/1 73 square meters)    Stage 3B Moderate CKD (GFR = 30-44 mL/min/1 73 square meters)    Stage 4 Severe CKD (GFR = 15-29 mL/min/1 73 square meters)    Stage 5 End Stage CKD (GFR <15 mL/min/1 73 square meters)  Note: GFR calculation is accurate only with a steady state creatinine    Protime-INR [[id number]]  (Normal) Collected:  09/04/19 0812    Lab Status:  Final result Specimen:  Blood from Arm, Left Updated:  09/04/19 0829     Protime 12 5 seconds      INR 1  08    APTT [[id number]]  (Normal) Collected:  09/04/19 0812    Lab Status:  Final result Specimen:  Blood from Arm, Left Updated:  09/04/19 0829     PTT 32 seconds     CBC and Platelet [[id number]]  (Abnormal) Collected:  09/04/19 0812    Lab Status:  Final result Specimen:  Blood from Arm, Left Updated:  09/04/19 0829     WBC 8 90 Thousand/uL      RBC 4 45 Million/uL      Hemoglobin 12 4 g/dL      Hematocrit 38 2 %      MCV 86 fL      MCH 27 9 pg      MCHC 32 6 g/dL      RDW 15 0 %      Platelets 025 Thousands/uL      MPV 7 5 fL                  X-ray chest 1 view portable   Final Result by Danny Shah MD (09/04 1107)      Interstitial lung disease  Workstation performed: [id number]         CTA stroke alert (head/neck)   Final Result by Arcelia Lopez MD (09/04 0068)      Mild cervical ICA origin stenosis due to atherosclerotic disease  Moderate focal stenosis in the left cavernous ICA due to atherosclerotic plaque  Patent anterior and posterior intracranial circulation  Interstitial edema biapical paraseptal emphysematous disease with question of underlying chronic lung disease  Findings were directly discussed with Jaycob Sparks on 9/4/2019 8:41 AM                      Workstation performed: [id number]         CT stroke alert brain   Final Result by Arcelia Lopez MD (09/04 4350)      No acute intracranial hemorrhage  Chronic biparietal cortical infarctions  Chronic lacunar infarction within the left basal ganglia extending into the corona radiata  Generalized atrophy and mild cerebral chronic microangiopathic disease with intracranial vertebral and carotid atherosclerotic disease        Findings were directly discussed with Dr Daphne Lunsford of the ED on 9/4/2019 8:24 AM       Workstation performed: [id number]         MRI inpatient order    (Results Pending)              Procedures  Procedures       ED Course                               MDM  Number of Diagnoses or Management Options  Diagnosis management comments: 78years old female with past medical history of hypertension and TIA who presented to the emergency room because of generalized weakness and dizziness in emergency room she developed right arm and leg weakness and she was very hypertensive  Patient was placed on the monitor and she was giving 20 mg of labetalol intravenously  CT of the head was normal CT H everything open except some stenosis and a internal carotid artery origin and NIH SS is 4,   Discuss with the neurologist Dr Tj Elaine done at [facility] or suggest no tPA for now since the patient woke up with generalized weakness and unable to get up and developed right-sided weakness in emergency room, was impression the symptom got worse in emergency room  Decided the patient blood pressure 220/79  Patient was giving 20 mg of labetalol and his her current blood pressure 175/69  Also she was given 20 mg of lisinopril orally  Her EKG showed normal sinus rhythm with heart rate 66 and old anterior infarct  Disposition  Final diagnoses:   TIA (transient ischemic attack)   Hypertensive crisis     Time reflects when diagnosis was documented in both MDM as applicable and the Disposition within this note     Time User Action Codes Description Comment    9/4/2019  9:56 AM Mya Denson Add [G45 9] TIA (transient ischemic attack)     9/4/2019  9:57 AM Mya Jarett Add [I16 9] Hypertensive crisis       ED Disposition     ED Disposition Condition Date/Time Comment    Admit Fair Wed Sep 4, 2019  9:56 AM Case was discussed with sushila and the patient's admission status was agreed to be {Admitted to the service of Dr Ana Sandy          Follow-up Information    None         Current Discharge Medication List      CONTINUE these medications which have NOT CHANGED    Details   alendronate (FOSAMAX) 70 mg tablet Take 1 tablet (70 mg total) by mouth once a week  Qty: 12 tablet, Refills: 3    Associated Diagnoses: Age-related osteoporosis without current pathological fracture      allopurinol (ZYLOPRIM) 100 mg tablet Take 1 tablet (100 mg total) by mouth daily  Qty: 90 tablet, Refills: 1    Associated Diagnoses: Chronic gout without tophus, unspecified cause, unspecified site      atenolol (TENORMIN) 25 mg tablet Take 1 tablet (25 mg total) by mouth daily  Qty: 90 tablet, Refills: 1    Associated Diagnoses: Essential hypertension      clopidogrel (PLAVIX) 75 mg tablet Take 1 tablet (75 mg total) by mouth daily  Qty: 90 tablet, Refills: 1    Associated Diagnoses: Cerebrovascular accident (CVA), unspecified mechanism (HCC)      hydrochlorothiazide (HYDRODIURIL) 50 mg tablet Take 1 tablet (50 mg total) by mouth daily  Qty: 90 tablet, Refills: 1    Associated Diagnoses: Essential hypertension      lisinopril (ZESTRIL) 40 mg tablet Take 1 tablet (40 mg total) by mouth daily  Qty: 90 tablet, Refills: 1    Associated Diagnoses: Essential hypertension      Multiple Vitamins-Minerals (MULTI ADULT GUMMIES) CHEW Chew 1 tablet daily      OMEGA-3 FATTY ACIDS-VITAMIN E PO Take 1,000 mg by mouth      pentoxifylline (TRENtal) 400 mg ER tablet Take 1 tablet (400 mg total) by mouth 3 (three) times a day with meals  Qty: 90 tablet, Refills: 1    Associated Diagnoses: Cerebrovascular accident (CVA), unspecified mechanism (HCC)      simvastatin (ZOCOR) 40 mg tablet Take 1 tablet (40 mg total) by mouth daily at bedtime  Qty: 90 tablet, Refills: 1    Associated Diagnoses: Mixed hyperlipidemia      donepezil (ARICEPT) 5 mg tablet Take 1 tablet (5 mg total) by mouth daily at bedtime  Qty: 90 tablet, Refills: 3    Associated Diagnoses: Memory difficulties           No discharge procedures on file      ED Provider  Electronically Signed by           Ramakrishna Ortiz MD  09/04/19 0764

## 2019-09-04 NOTE — ASSESSMENT & PLAN NOTE
· BP elevated on admission  · Patient received labetalol in the ER  · Will allow for permissive hypertension given suspected CVA  · Resume home meds tomorrow

## 2019-09-05 VITALS
WEIGHT: 150 LBS | TEMPERATURE: 98.5 F | HEART RATE: 66 BPM | RESPIRATION RATE: 24 BRPM | OXYGEN SATURATION: 92 % | BODY MASS INDEX: 25.61 KG/M2 | DIASTOLIC BLOOD PRESSURE: 81 MMHG | HEIGHT: 64 IN | SYSTOLIC BLOOD PRESSURE: 167 MMHG

## 2019-09-05 PROBLEM — I63.9 ACUTE CVA (CEREBROVASCULAR ACCIDENT) (HCC): Status: ACTIVE | Noted: 2017-03-31

## 2019-09-05 LAB
ANION GAP SERPL CALCULATED.3IONS-SCNC: 10 MMOL/L (ref 4–13)
BASOPHILS # BLD AUTO: 0.1 THOUSANDS/ΜL (ref 0–0.1)
BASOPHILS NFR BLD AUTO: 1 % (ref 0–2)
BUN SERPL-MCNC: 18 MG/DL (ref 7–25)
CALCIUM SERPL-MCNC: 9.5 MG/DL (ref 8.6–10.5)
CHLORIDE SERPL-SCNC: 102 MMOL/L (ref 98–107)
CHOLEST SERPL-MCNC: 173 MG/DL (ref 0–200)
CO2 SERPL-SCNC: 27 MMOL/L (ref 21–31)
CREAT SERPL-MCNC: 1.05 MG/DL (ref 0.6–1.2)
EOSINOPHIL # BLD AUTO: 0.7 THOUSAND/ΜL (ref 0–0.61)
EOSINOPHIL NFR BLD AUTO: 9 % (ref 0–5)
ERYTHROCYTE [DISTWIDTH] IN BLOOD BY AUTOMATED COUNT: 15.2 % (ref 11.5–14.5)
EST. AVERAGE GLUCOSE BLD GHB EST-MCNC: 105 MG/DL
GFR SERPL CREATININE-BSD FRML MDRD: 51 ML/MIN/1.73SQ M
GLUCOSE SERPL-MCNC: 101 MG/DL (ref 65–99)
GLUCOSE SERPL-MCNC: 90 MG/DL (ref 65–140)
HBA1C MFR BLD: 5.3 % (ref 4.2–6.3)
HCT VFR BLD AUTO: 38.6 % (ref 42–47)
HDLC SERPL-MCNC: 47 MG/DL (ref 40–60)
HGB BLD-MCNC: 12.5 G/DL (ref 12–16)
LDLC SERPL CALC-MCNC: 99 MG/DL (ref 0–100)
LYMPHOCYTES # BLD AUTO: 2 THOUSANDS/ΜL (ref 0.6–4.47)
LYMPHOCYTES NFR BLD AUTO: 25 % (ref 21–51)
MCH RBC QN AUTO: 28 PG (ref 26–34)
MCHC RBC AUTO-ENTMCNC: 32.4 G/DL (ref 31–37)
MCV RBC AUTO: 87 FL (ref 81–99)
MONOCYTES # BLD AUTO: 0.5 THOUSAND/ΜL (ref 0.17–1.22)
MONOCYTES NFR BLD AUTO: 7 % (ref 2–12)
NEUTROPHILS # BLD AUTO: 4.5 THOUSANDS/ΜL (ref 1.4–6.5)
NEUTS SEG NFR BLD AUTO: 58 % (ref 42–75)
NONHDLC SERPL-MCNC: 126 MG/DL
PLATELET # BLD AUTO: 335 THOUSANDS/UL (ref 149–390)
PMV BLD AUTO: 8 FL (ref 8.6–11.7)
POTASSIUM SERPL-SCNC: 3.4 MMOL/L (ref 3.5–5.5)
RBC # BLD AUTO: 4.47 MILLION/UL (ref 3.9–5.2)
SODIUM SERPL-SCNC: 139 MMOL/L (ref 134–143)
TRIGL SERPL-MCNC: 133 MG/DL (ref 44–166)
WBC # BLD AUTO: 7.8 THOUSAND/UL (ref 4.8–10.8)

## 2019-09-05 PROCEDURE — 85025 COMPLETE CBC W/AUTO DIFF WBC: CPT | Performed by: INTERNAL MEDICINE

## 2019-09-05 PROCEDURE — 99239 HOSP IP/OBS DSCHRG MGMT >30: CPT | Performed by: INTERNAL MEDICINE

## 2019-09-05 PROCEDURE — 97530 THERAPEUTIC ACTIVITIES: CPT

## 2019-09-05 PROCEDURE — G8996 SWALLOW CURRENT STATUS: HCPCS

## 2019-09-05 PROCEDURE — G8997 SWALLOW GOAL STATUS: HCPCS

## 2019-09-05 PROCEDURE — 80048 BASIC METABOLIC PNL TOTAL CA: CPT | Performed by: INTERNAL MEDICINE

## 2019-09-05 PROCEDURE — G8998 SWALLOW D/C STATUS: HCPCS

## 2019-09-05 PROCEDURE — 97112 NEUROMUSCULAR REEDUCATION: CPT

## 2019-09-05 PROCEDURE — 97116 GAIT TRAINING THERAPY: CPT

## 2019-09-05 PROCEDURE — 92610 EVALUATE SWALLOWING FUNCTION: CPT

## 2019-09-05 PROCEDURE — 97535 SELF CARE MNGMENT TRAINING: CPT

## 2019-09-05 PROCEDURE — 83036 HEMOGLOBIN GLYCOSYLATED A1C: CPT | Performed by: HOSPITALIST

## 2019-09-05 PROCEDURE — 80061 LIPID PANEL: CPT | Performed by: HOSPITALIST

## 2019-09-05 PROCEDURE — 99220 PR INITIAL OBSERVATION CARE/DAY 70 MINUTES: CPT | Performed by: PSYCHIATRY & NEUROLOGY

## 2019-09-05 RX ORDER — POTASSIUM CHLORIDE 20 MEQ/1
20 TABLET, EXTENDED RELEASE ORAL ONCE
Status: COMPLETED | OUTPATIENT
Start: 2019-09-05 | End: 2019-09-05

## 2019-09-05 RX ORDER — ATORVASTATIN CALCIUM 40 MG/1
40 TABLET, FILM COATED ORAL DAILY
Qty: 30 TABLET | Refills: 0 | Status: SHIPPED | OUTPATIENT
Start: 2019-09-05 | End: 2019-10-17 | Stop reason: SDUPTHER

## 2019-09-05 RX ADMIN — ACETAMINOPHEN 650 MG: 325 TABLET ORAL at 08:13

## 2019-09-05 RX ADMIN — PENTOXIFYLLINE 400 MG: 400 TABLET, EXTENDED RELEASE ORAL at 15:47

## 2019-09-05 RX ADMIN — HEPARIN SODIUM 5000 UNITS: 5000 INJECTION INTRAVENOUS; SUBCUTANEOUS at 06:23

## 2019-09-05 RX ADMIN — HEPARIN SODIUM 5000 UNITS: 5000 INJECTION INTRAVENOUS; SUBCUTANEOUS at 13:51

## 2019-09-05 RX ADMIN — PRAVASTATIN SODIUM 80 MG: 20 TABLET ORAL at 15:47

## 2019-09-05 RX ADMIN — ALLOPURINOL 100 MG: 100 TABLET ORAL at 08:14

## 2019-09-05 RX ADMIN — PENTOXIFYLLINE 400 MG: 400 TABLET, EXTENDED RELEASE ORAL at 08:14

## 2019-09-05 RX ADMIN — CLOPIDOGREL BISULFATE 75 MG: 75 TABLET ORAL at 08:14

## 2019-09-05 RX ADMIN — ATENOLOL 25 MG: 25 TABLET ORAL at 08:14

## 2019-09-05 RX ADMIN — LISINOPRIL 40 MG: 20 TABLET ORAL at 08:14

## 2019-09-05 RX ADMIN — POTASSIUM CHLORIDE 20 MEQ: 1500 TABLET, EXTENDED RELEASE ORAL at 15:47

## 2019-09-05 NOTE — TELEMEDICINE
TeleConsultation - Neurology   Wil Su 78 y o  female MRN: [id number]   Encounter: [id number]      REQUIRED DOCUMENTATION:     1  This service was provided via Telemedicine  2  Provider located at home  3  TeleMed provider: Sb Earl MD   4  Identify all parties in room with patient during tele consult:  Granddaughter  5  After connecting through [place], patient was identified by name and date of birth and assistant checked wristband  Patient was then informed that this was a Telemedicine visit and that the exam was being conducted confidentially over secure lines  My office door was closed  No one else was in the room  Patient acknowledged consent and understanding of privacy and security of the Telemedicine visit, and gave us permission to have the assistant stay in the room in order to assist with the history and to conduct the exam   I informed the patient that I have reviewed their record in Epic and presented the opportunity for them to ask any questions regarding the visit today  The patient agreed to participate  Assessment/Plan   Assessment:  Resolving weakness of right arm and leg  Etiology is minute pontine lacunar infarct (as seen on MRI) due to uncontrolled HTN  CT head is normal  CTA shows no large vessel occlusion  Mild cervical ICA origin stenosis due to atherosclerotic disease    Moderate focal stenosis in the left cavernous ICA due to atherosclerotic plaque  LDL 99  HgbA1c 5 3  BP last 24 h: 156/65 to 184/80    Plan:  Continue plavix  Switch Pravastatin to Atorvastatin 40 mg  PT/OT  Risk factor control including tighter blood pressure, target is systolic< 515      History of Present Illness     Reason for Consult / Principal Problem: weakness  Hx and PE limited by: none  HPI: Wil Su is a 78 y o  female who presents with generalized weakness noted upon trying to stand up from lying position yesterday morning  She called "life alert" and presented to ED   While here, she was noted to have right arm and leg weakness  She was also in hypertensive emergency with /98  Blood pressure was controlled slowly and symptoms began to improve  At this point she is almost back to normal  Patient stated she had prior "mini strokes" in the past, including one last year where she had facial weakness and dysarthria  All of them resolved abruptly  No other neurological symptoms  Patient feels well at this point  Consult to Neurology  Consult performed by: Keyla Angel MD  Consult ordered by: Gaston Red MD          Review of Systems  Complete ROS was done and is negative other than what is mentioned in HPI    Historical Information   Past Medical History:   Diagnosis Date    Chronic kidney disease     Dementia     Gout     Hypertension     Osteoporosis      Past Surgical History:   Procedure Laterality Date    APPENDECTOMY      HYSTERECTOMY      TUBAL LIGATION      UTERINE FIBROID SURGERY       Social History   Social History     Substance and Sexual Activity   Alcohol Use No    Frequency: Never    Drinks per session: Patient refused    Binge frequency: Never     Social History     Substance and Sexual Activity   Drug Use No     Social History     Tobacco Use   Smoking Status Former Smoker   Smokeless Tobacco Never Used     Family History: non-contributory    Review of previous medical records was completed  Meds/Allergies   PTA meds:   Prior to Admission Medications   Prescriptions Last Dose Informant Patient Reported? Taking?    Multiple Vitamins-Minerals (MULTI ADULT GUMMIES) CHEW 9/3/2019 at Unknown time  Yes Yes   Sig: Chew 1 tablet daily   OMEGA-3 FATTY ACIDS-VITAMIN E PO 9/3/2019 at Unknown time  Yes Yes   Sig: Take 1,000 mg by mouth   alendronate (FOSAMAX) 70 mg tablet 8/26/2019 at Unknown time  No Yes   Sig: Take 1 tablet (70 mg total) by mouth once a week   allopurinol (ZYLOPRIM) 100 mg tablet 9/3/2019 at Unknown time  No Yes   Sig: Take 1 tablet (100 mg total) by mouth daily   atenolol (TENORMIN) 25 mg tablet 9/3/2019 at Unknown time  No Yes   Sig: Take 1 tablet (25 mg total) by mouth daily   clopidogrel (PLAVIX) 75 mg tablet 9/3/2019 at Unknown time  No Yes   Sig: Take 1 tablet (75 mg total) by mouth daily   donepezil (ARICEPT) 5 mg tablet Not Taking at Unknown time  No No   Sig: Take 1 tablet (5 mg total) by mouth daily at bedtime   Patient not taking: Reported on 9/4/2019   hydrochlorothiazide (HYDRODIURIL) 50 mg tablet 9/3/2019 at Unknown time  No Yes   Sig: Take 1 tablet (50 mg total) by mouth daily   lisinopril (ZESTRIL) 40 mg tablet 9/3/2019 at Unknown time  No Yes   Sig: Take 1 tablet (40 mg total) by mouth daily   pentoxifylline (TRENtal) 400 mg ER tablet 9/3/2019 at Unknown time  No Yes   Sig: Take 1 tablet (400 mg total) by mouth 3 (three) times a day with meals   simvastatin (ZOCOR) 40 mg tablet 9/3/2019 at Unknown time  No Yes   Sig: Take 1 tablet (40 mg total) by mouth daily at bedtime      Facility-Administered Medications: None       Allergies   Allergen Reactions    Oxycodone-Acetaminophen Rash       Objective   Vitals:Blood pressure (!) 178/72, pulse 69, temperature 98 5 °F (36 9 °C), temperature source Temporal, resp  rate (!) 38, height 5' 4" (1 626 m), weight 68 kg (150 lb), SpO2 91 %  ,Body mass index is 25 75 kg/m²  Intake/Output Summary (Last 24 hours) at 9/5/2019 1358  Last data filed at 9/4/2019 1801  Gross per 24 hour   Intake 200 ml   Output 1 ml   Net 199 ml       Invasive Devices: Invasive Devices     Peripheral Intravenous Line            Peripheral IV 09/04/19 Left Antecubital 1 day                Physical Exam NAD  Skin: no seen lesions  HEENT: ATNC  Chest: breathing comfortably on room air  GI: no apparent distension  Neurologic Exam Alert and oriented for self, place and time  Memory is intact to recent and remote events  Language is intact to comprehension and expression  No neglect  Speech is normal  EOMI   Pupils are symmetric  Visual field appears intact  Face is symmetric  Tongue is midline  Able to move all extremities against gravity  No dysmetria noted  NIHSS 0    Lab Results:   CBC:   Results from last 7 days   Lab Units 09/05/19  0439 09/04/19  0812   WBC Thousand/uL 7 80 8 90   RBC Million/uL 4 47 4 45   HEMOGLOBIN g/dL 12 5 12 4   HEMATOCRIT % 38 6* 38 2*   MCV fL 87 86   PLATELETS Thousands/uL 335 362   , BMP/CMP:   Results from last 7 days   Lab Units 09/05/19  0439 09/04/19  0812   SODIUM mmol/L 139 139   POTASSIUM mmol/L 3 4* 3 6   CHLORIDE mmol/L 102 101   CO2 mmol/L 27 30   BUN mg/dL 18 22   CREATININE mg/dL 1 05 1 16   CALCIUM mg/dL 9 5 9 2   EGFR ml/min/1 73sq m 51 45   , TSH:   , Coagulation:   Results from last 7 days   Lab Units 09/04/19  0812   INR  1 08   , Lipid Profile:   Results from last 7 days   Lab Units 09/05/19  0439   HDL mg/dL 47   LDL CALC mg/dL 99   TRIGLYCERIDES mg/dL 133     Imaging Studies: I have personally reviewed pertinent films in PACS  EKG, Pathology, and Other Studies: I have personally reviewed pertinent reports

## 2019-09-05 NOTE — PLAN OF CARE
Problem: OCCUPATIONAL THERAPY ADULT  Goal: Performs self-care activities at highest level of function for planned discharge setting  See evaluation for individualized goals  Description  Treatment Interventions: ADL retraining, Functional transfer training, UE strengthening/ROM, Endurance training, Cognitive reorientation, Patient/family training, Compensatory technique education, Neuromuscular reeducation, Continued evaluation          See flowsheet documentation for full assessment, interventions and recommendations  Outcome: Progressing  Note:   Limitation: Decreased ADL status, Decreased UE strength, Decreased Safe judgement during ADL, Decreased cognition, Decreased endurance, Decreased self-care trans, Decreased high-level ADLs     Assessment: Patient participated in Skilled OT session this date with interventions consisting of ADL re training with the use of correct body mechnaics, safety awareness and fall prevention techniques and  therapeutic activities to: increase activity tolerance   Patient agreeable to OT treatment session, upon arrival patient was found seated at edge of bed  Patient requiring ocassional safety reminders  Patient continues to be functioning below baseline level, occupational performance remains limited secondary to factors listed above and increased risk for falls and injury  From OT standpoint, recommendation at time of d/c would be Home with family support  Patient to benefit from continued Occupational Therapy treatment while in the hospital to address deficits as defined above and maximize level of functional independence with ADLs and functional mobility      OT Discharge Recommendation:  Home with family support   OT - OK to Discharge: (Once medically cleared )     GEORGI Briceno/ALECIA

## 2019-09-05 NOTE — PLAN OF CARE
Problem: PHYSICAL THERAPY ADULT  Goal: Performs mobility at highest level of function for planned discharge setting  See evaluation for individualized goals  Description  Treatment/Interventions: Functional transfer training, LE strengthening/ROM, Therapeutic exercise, Endurance training, Equipment eval/education, Patient/family training, Bed mobility, Gait training, Spoke to nursing, OT(&neuro re-education w/balance training)  Equipment Recommended: (TBD)    See flowsheet documentation for full assessment, interventions and recommendations  Anderson Baltazar PT     Outcome: Progressing  Note:   Prognosis: Excellent  Problem List: Decreased endurance, Impaired balance, Decreased mobility  Assessment: Pt seen for PT treatment session this date with interventions consisting of gait training w/ emphasis on improving pt's ability to ambulate level surfaces x 100 feet with close S provided by therapist with no AD, therapeutic activity consisting of training: bed mobility, supine<>sit transfers, sit<>stand transfers, static sitting tolerance at EOB for 5 minutes w/ o UE support, static standing tolerance for 5 minutes w/ o UE support, vc and tactile cues for static sitting posture faciliation, vc and tactile cues for static standing posture faciliation and stand pivot transfers towards B direction and neuromuscular re-education: side stepping bilaterally, marching and multi-directional head turns x 15 reps ft w/ o UE support  Pt agreeable to PT treatment session upon arrival, pt found supine in bed w/ HOB elevated, in no apparent distress and A&O x 4  In comparison to previous session, pt with significant improvements in strength, balance,activity tolerance  Post session: all needs in reach and RN notified of session findings/recommendations Continue to recommend anticipate no needs at time of d/c in order to maximize pt's functional independence and safety w/ mobility   Pt continues to be functioning below baseline level, and remains limited 2* factors listed above and including decreased endurance, impaired balance  PT will continue to see pt while here in order to address the deficits listed above and provide interventions consistent w/ POC in effort to achieve LTGs  Barriers to Discharge: Inaccessible home environment  Barriers to Discharge Comments: JENNIFER home  Recommendation: Home independently     PT - OK to Discharge: Yes(if medically stable)    See flowsheet documentation for full assessment     Neha Doctor, PT

## 2019-09-05 NOTE — OCCUPATIONAL THERAPY NOTE
09/05/19 1530   Restrictions/Precautions   Weight Bearing Precautions Per Order No   Other Precautions Telemetry; Fall Risk   General   Family/Caregiver Present one first approach krish was visiting   Pain Assessment   Pain Assessment No/denies pain   ADL   Where Assessed Edge of bed  (sitting and standing)   Eating Comments patient brushed teeth with supplies brought in by daughter   UB Bathing Assistance 5  Supervision/Setup   UB Bathing Deficit Setup   LB Bathing Assistance 5  Supervision/Setup   LB Bathing Deficit Verbal cueing;Supervision/safety   UB Dressing Comments required minimal assist  with hospital gown secondary to unfamiliar design of same   LB Dressing Comments Indep  with doff/don socks   Functional Standing Tolerance   Time 5+ minutes   Activity   (bathing and brushing teeth )   Bed Mobility   Additional Comments patient sitting at edge of bed on approach    Transfers   Sit to Stand 7  Independent   Stand to Sit 7  Independent   Additional Comments patient eager to be on her feet : "i've been sitting for so long"    Functional Mobility   Functional Mobility 5  Supervision   Additional Comments she bends to reach floor with no LOB    Coordination   Gross Motor WFL   Dexterity WFL   Cognition   Overall Cognitive Status WFL   Arousal/Participation Alert; Cooperative   Comments patient Very talkative about family past history    Activity Tolerance   Activity Tolerance Patient tolerated treatment well   Assessment   Assessment Patient participated in Skilled OT session this date with interventions consisting of ADL re training with the use of correct body mechnaics, safety awareness and fall prevention techniques and  therapeutic activities to: increase activity tolerance   Patient agreeable to OT treatment session, upon arrival patient was found seated at edge of bed  Patient requiring ocassional safety reminders   Patient continues to be functioning below baseline level, occupational performance remains limited secondary to factors listed above and increased risk for falls and injury  From OT standpoint, recommendation at time of d/c would be Home with family support  Patient to benefit from continued Occupational Therapy treatment while in the hospital to address deficits as defined above and maximize level of functional independence with ADLs and functional mobility      Plan   Treatment Interventions ADL retraining   Goal Expiration Date 09/14/19   Treatment Day 1   Gladys Mean, LAUREANO/L

## 2019-09-05 NOTE — ASSESSMENT & PLAN NOTE
· Patient with lacunar infarct seen on MRI  · Neurology input appreciated  · Recommendation was for patient to switch from simvastatin to atorvastatin 40 mg daily  · Continue Plavix  · PT/OT eval appreciated  · Echo result appreciated  · Patient is back to baseline  · Patient stable for discharge with outpatient follow-up

## 2019-09-05 NOTE — PROGRESS NOTES
Progress Note - Maxine Batista 1940, 78 y o  female MRN: [id number]    Unit/Bed#: ICU 02 Encounter: [id number]    Primary Care Provider: Zahra Mullins DO   Date and time admitted to hospital: 9/4/2019  8:05 AM        * Acute CVA (cerebrovascular accident) [facility])  Assessment & Plan  · Clinically improved  · Patient only complains of a headache at this point  · Status post MRI testing with the following results:1 mm focus of possible diffusion restriction in the left juanita could relate to acute ischemia  · Status post a neurology evaluation in the emergency room at time of arrival, await formal Neurology input here in the ICU  · Continue Plavix and pravastatin for secondary prevention  · 2D echocardiogram reviewed-grossly within normal limits  · Status post PT and OT eval-they both recommended short-term rehab - will reach out to case management for further assistance  · Current NIH stroke score scale is 0    Hypertension  Assessment & Plan  · Okay for a little permissive hypertension  · Blood pressures are better than yesterday  · Continue lisinopril and atenolol  · Await further recommendations from Neurology    Mixed hyperlipidemia  Assessment & Plan  · Continue pravastatin 80 mg daily    Gout  Assessment & Plan  · Continue allopurinol      VTE Pharmacologic Prophylaxis: Pharmacologic: Heparin    Patient Centered Rounds: I have performed bedside rounds with nursing staff today      Discussions with Specialists or Other Care Team Provider:  Case management, nursing and pharmacy  Education and Discussions with Family / Patient:  Patient was brought up to par with the plan of care for today, all questions answered to her satisfaction, attempts to call the patient's daughter Mimi Mckenzie at phone number [phone] proved futile    Current Length of Stay: 1 day(s)    Current Patient Status: Inpatient   Certification Statement: The patient will continue to require additional inpatient hospital stay due to The impending neuro evaluation    Discharge Plan:  Discharge planning will commence once the patient has been cleared by Neurology    Code Status: Level 3 - DNAR and DNI    Subjective:   Patient seen and examined  Patient complains of a global headache rating it a 4/10  Feels that the weakness is better  Denies any numbness or tingling  Objective:     Vitals:   Temp (24hrs), Av 2 °F (36 8 °C), Min:98 °F (36 7 °C), Max:98 5 °F (36 9 °C)    Temp:  [98 °F (36 7 °C)-98 5 °F (36 9 °C)] 98 °F (36 7 °C)  HR:  [] 71  Resp:  [15-42] 18  BP: (141-191)/() 165/71  SpO2:  [88 %-95 %] 90 %  Body mass index is 25 75 kg/m²  Input and Output Summary (last 24 hours): Intake/Output Summary (Last 24 hours) at 2019 0833  Last data filed at 2019 1801  Gross per 24 hour   Intake 200 ml   Output 1 ml   Net 199 ml       Physical Exam:     Physical Exam   Constitutional: She is oriented to person, place, and time  She appears well-developed and well-nourished  HENT:   Head: Normocephalic and atraumatic  Nose: Nose normal    Mouth/Throat: Oropharynx is clear and moist    Eyes: Pupils are equal, round, and reactive to light  Conjunctivae and EOM are normal    Neck: Normal range of motion  Neck supple  No JVD present  No thyromegaly present  Cardiovascular: Normal rate, regular rhythm and intact distal pulses  Exam reveals no gallop and no friction rub  No murmur heard  Pulmonary/Chest: Effort normal and breath sounds normal  No respiratory distress  Abdominal: Soft  Bowel sounds are normal  She exhibits no distension and no mass  There is no tenderness  There is no guarding  Musculoskeletal: Normal range of motion  She exhibits no edema  Lymphadenopathy:     She has no cervical adenopathy  Neurological: She is alert and oriented to person, place, and time  No cranial nerve deficit     AAO x3  Cranial nerves 2-12 are intact  Motor 5/5 in all limbs  Sensations intact  Cerebellar function testing within normal limits  Current NIH stroke score scale is 0   Skin: Skin is warm  No rash noted  No erythema  Psychiatric: She has a normal mood and affect  Her behavior is normal    Vitals reviewed  Additional Data:     Labs:    Results from last 7 days   Lab Units 09/05/19  0439   WBC Thousand/uL 7 80   HEMOGLOBIN g/dL 12 5   HEMATOCRIT % 38 6*   PLATELETS Thousands/uL 335   NEUTROS PCT % 58   LYMPHS PCT % 25   MONOS PCT % 7   EOS PCT % 9*     Results from last 7 days   Lab Units 09/05/19  0439   POTASSIUM mmol/L 3 4*   CHLORIDE mmol/L 102   CO2 mmol/L 27   BUN mg/dL 18   CREATININE mg/dL 1 05   CALCIUM mg/dL 9 5     Results from last 7 days   Lab Units 09/04/19  0812   INR  1 08               * I Have Reviewed All Lab Data Listed Above  * Additional Pertinent Lab Tests Reviewed: Bimal [facility] Admission  Reviewed    Imaging:  Imaging Reports Reviewed Today Include:  MRI brain - finding as above    Recent Cultures (last 7 days):           Last 24 Hours Medication List:     Current Facility-Administered Medications:  acetaminophen 650 mg Oral Q6H PRN Mercy Flores MD   allopurinol 100 mg Oral Daily Mercy Flores MD   atenolol 25 mg Oral Daily Mercy Flores MD   clopidogrel 75 mg Oral Daily Mercy Flores MD   heparin (porcine) 5,000 Units Subcutaneous [facility] Mercy Flores MD   lisinopril 40 mg Oral Daily Mercy Flores MD   ondansetron 4 mg Intravenous Q6H PRN Mercy Flores MD   pentoxifylline 400 mg Oral BID With Meals Mercy Flores MD   pravastatin 80 mg Oral Daily With Flora Boyer MD        Today, Patient Was Seen By: Alonzo Black MD    ** Please Note: Dictation voice to text software may have been used in the creation of this document   **

## 2019-09-05 NOTE — NURSING NOTE
Patient OOB at this time, ambulating unassisted with SCD's attached  Was OOB in chair for lunch, questioned where patient was going she said "back to bed"  Reminded patient she has her SCD's attached and could trip over cords  Requested patient ring call bell for assistance, expressed understanding of same

## 2019-09-05 NOTE — SPEECH THERAPY NOTE
Speech Language/Pathology  Speech/Language Pathology Dysphagia Assessment    Patient Name: Shorty Garza  SJUKE'A Date: 9/5/2019     Problem List  Patient Active Problem List   Diagnosis    CKD (chronic kidney disease), stage III ([facility] )    Gout    Osteoporosis    Hypertension    Mixed hyperlipidemia    Cerebrovascular disease, arteriosclerotic, post-stroke    Peripheral vascular disease ([facility] )    Polymyalgia rheumatica ([facility] )    Acute CVA (cerebrovascular accident) ([facility] )    Intermittent claudication ([facility] )     Past Medical History  Past Medical History:   Diagnosis Date    Chronic kidney disease     Dementia     Gout     Hypertension     Osteoporosis      Past Surgical History  Past Surgical History:   Procedure Laterality Date    APPENDECTOMY      HYSTERECTOMY      TUBAL LIGATION      UTERINE FIBROID SURGERY          Bedside Swallow Evaluation:    Summary:  Pt presents w/ normal oropharyngeal swallow  No overt s/s difficulty or aspiration observed  Recommendations:  Diet: Regular  Liquid: thin  Meds: as desired/tolerated    Eval only, No f/u tx indicated  HPI:  Per H&P:  Shorty Garza is a 78 y o  female who presents with right-sided weakness  Patient states that she woke up this morning unable to get up on her own which is unusual for her  Does have a history of CVAs however does not have any history of residual weakness and states she is able to ambulate without any assist devices  Patient was concerned when she was unable to get up and then noticed that she was experiencing right-sided weakness  Patient pressed her Life Alert and was brought into the ER by EMS     In the ER NIH stroke scale was 3 and patient was not a candidate for tPA per Neurology evaluation  CT head and CTA head and neck were negative for any acute findings  Patient was noted to have some chronic findings of previous CVAs      Reason for consult:  R/o aspiration  Determine safest and least restrictive diet  New neuro event    Current diet:  Regular with thin liquids  Premorbid diet[de-identified]  Regular with thin liquids  Previous VBS:  none  O2 requirement:  Room air  Voice/Speech:  WNLs  Social:  Lives with her son  Follows commands:  yes                        Cognitive Status:  Awake, alert, oriented  Oral [facility] exam:  Dentition: natural; missing molars  Labial strength and ROM: good  Lingual strength and ROM: good  Mandibular strength and ROM: good      Items administered:  Puree,lucero crackers, thin liquids  Liquids were taken by straw       Oral stage:  Lip closure: good  Mastication: effective  Bolus formation: good  Bolus control: good  Transfer: prompt and effective  Oral residue: none  Pocketing: no    Pharyngeal stage:  Swallow promptness: appeared promt  Laryngeal rise: good per palpation  Wet voice: no  Throat clear: no  Cough: no  Secondary swallows: no  Audible swallows: no  No overt s/s aspiration    Esophageal stage:  No s/s reported    Results d/w:  Pt, nursing, granddaughter at bedside

## 2019-09-05 NOTE — ASSESSMENT & PLAN NOTE
· Clinically improved  · Patient only complains of a headache at this point  · Status post MRI testing with the following results:1 mm focus of possible diffusion restriction in the left juanita could relate to acute ischemia  · Status post a neurology evaluation in the emergency room at time of arrival, await formal Neurology input here in the ICU  · Continue Plavix and pravastatin for secondary prevention    · 2D echocardiogram reviewed-grossly within normal limits  · Status post PT and OT eval-they both recommended short-term rehab - will reach out to case management for further assistance  · Current NIH stroke score scale is 0

## 2019-09-05 NOTE — SOCIAL WORK
Evaluated the pt with the daughters present  Discussed the role of CM and options with discharge planning  Pt was admitted to the hospital with acute CVA  Pt states she lives with her son and grandson in a 2 story home  Pt has 1 JENNIFER  Her bedroom and bath are on the first floor  Pt reports she does not go upstairs to shower, she sponge bathes  Pt indicated she is independent with ADL's and IADL's except for driving  Pt goes to the store with her son  Pt states everyone does their own laundry  Pt son provides transport  Pt reports she has no DME  Pt [facility] her [facility]  A post acute care recommendation was made by your care team for [facility]  Discussed [place] of Choice with patient  List of agencies given to patient via in person  patient aware the list is custom filtered for them by zip code location and that [facility] post acute providers are designated  Gave the list to the pt who states she wants to review same with her son and they will decide on an agency together  Son will transport home upon discharge  Patient  received discharge checklist   Content reviewed  Patient/caregiver encouraged to participate in discharge plan of care prior to discharge home  CM reviewed d/c planning process including the following: identifying help at home, patient preference for d/c planning needs, availability of treatment team to discuss questions or concerns patient and/or family may have regarding understanding medications and recognizing signs and symptoms once discharged  CM also encouraged patient to follow up with all recommended appointments after discharge  Patient advised of importance for patient and family to participate in managing patients medical well being

## 2019-09-05 NOTE — PHYSICAL THERAPY NOTE
Physical Therapy Treatment Session Note    Patient's Name: Jony Stephens    Admitting Diagnosis  TIA (transient ischemic attack) [G45 9]  Hypertensive crisis [I16 9]  Weakness [R53 1]    Problem List  Patient Active Problem List   Diagnosis    CKD [facility]chronic [facility] stage III ([facility] )    Gout    Osteoporosis    Hypertension    Mixed hyperlipidemia    Cerebrovascular disease, arteriosclerotic, post-stroke    Peripheral vascular disease ([facility] )    Polymyalgia rheumatica ([facility] )    Acute CVA (cerebrovascular accident) ([place] [address] )    Intermittent claudication ([facility] )       Past Medical History  Past Medical History:   Diagnosis Date    Chronic kidney disease     Dementia     Gout     Hypertension     Osteoporosis        Past Surgical History  Past Surgical History:   Procedure Laterality Date    APPENDECTOMY      HYSTERECTOMY      TUBAL LIGATION      UTERINE FIBROID SURGERY          09/05/19 1210   Pain Assessment   Pain Assessment No/denies pain   Pain Score No Pain   Restrictions/Precautions   Weight Bearing Precautions Per Order No   Other Precautions Fall Risk;Telemetry;Multiple lines   General   Chart Reviewed Yes   Additional Pertinent History Pt's nurse Dorothea reports [place] is appropriate for interventions, appears symptoms have resolved  (RLE assessed, strength improved to 3+/5 gross musculature)   Family/Caregiver Present Yes  (daughter upon arrival)   Cognition   Overall Cognitive Status [facility]   Arousal/Participation Alert; Cooperative   Attention Attends with cues to redirect   Orientation Level Oriented X4   Memory Within functional limits   Following Commands Follows one step commands without difficulty   Subjective   Subjective "I'm much better today"   Bed Mobility   Rolling R 6  Modified independent   Additional items HOB elevated; Bedrails   Rolling L 6  Modified independent   Additional items HOB elevated; Bedrails   Supine to Sit 6  Modified independent   Additional items HOB elevated; Bedrails Sit to Supine   (DNT as pt  remained OOB in chair upon conclusion )   Transfers   Sit to Stand 5  Supervision   Additional items Assist x 1;Bedrails   Stand to Sit 5  Supervision   Additional items Assist x 1; Armrests   Stand pivot 5  Supervision   Additional items Assist x 1   Ambulation/Elevation   Gait pattern Forward Flexion;Decreased foot clearance; Short stride   Gait Assistance 5  Supervision   Additional items Assist x 1;Verbal cues; Tactile cues  (VC's for KSENIA adjustment,safety w/directional changes x 4)   Assistive Device None   Distance 100 feet   Stair Management Assistance Not tested   Balance   Static Sitting Good   Dynamic Sitting Good   Static Standing Good   Dynamic Standing Good   Ambulatory Fair +   Endurance Deficit   Endurance Deficit Yes   Endurance Deficit Description /73   O2 saturation 90%   HR 76   Activity Tolerance   Activity Tolerance Patient tolerated treatment well   Nurse Made Aware yes, Dorothea RN & Angella,CM   Exercises   Neuro re-ed X 15 minutes including: standing multi-directional head turns 15 reps each w/ supervision of 1 w/o BUE support, sidestepping B x 15 reps w/ supervision of 1 w/o BUE support, BLE marching x 15 reps w/ supervision of 1 w/o BUE support , and continued postural facilitation to increase stability & safety  Assessment   Prognosis Excellent   Problem List Decreased endurance; Impaired balance;Decreased mobility   Assessment Pt seen for PT treatment session this date with interventions consisting of gait training w/ emphasis on improving pt's ability to ambulate level surfaces x 100 feet with close S provided by therapist with no AD, therapeutic activity consisting of training: bed mobility, supine<>sit transfers, sit<>stand transfers, static sitting tolerance at EOB for 5 minutes w/ o UE support, static standing tolerance for 5 minutes w/ o UE support, vc and tactile cues for static sitting posture faciliation, vc and tactile cues for static standing posture faciliation and stand pivot transfers towards B direction and neuromuscular re-education: side stepping bilaterally, marching and multi-directional head turns x 15 reps ft w/ o UE support  Pt agreeable to PT treatment session upon arrival, pt found supine in bed w/ HOB elevated, in no apparent distress and A&O x 4  In comparison to previous session, pt with significant improvements in strength, balance,activity tolerance  Post session: all needs in reach and RN notified of session findings/recommendations Continue to recommend anticipate no needs at time of d/c in order to maximize pt's functional independence and safety w/ mobility  Pt continues to be functioning below baseline level, and remains limited 2* factors listed above and including decreased endurance, impaired balance  PT will continue to see pt while here in order to address the deficits listed above and provide interventions consistent w/ POC in effort to achieve LTGs  Goals   Patient Goals go home soon   LTG Expiration Date 09/14/19   Long Term Goal #1 Progress AMB goal to 250-300 feet w/o AD independent of assist, dynamic standing balance to Good, and all transfers independent  Treatment Day 1   Plan   Treatment/Interventions Endurance training;Gait training;Spoke to nursing;Spoke to case management;OT  (&neuro re-education w/balance training)   Progress Improving as expected  (exceeding previously provided goals)   PT Frequency 2-3x/wk   Recommendation   Recommendation Home independently   PT - OK to Discharge Yes  (if medically stable)   Additional Comments Upon conclusion, pt  was sitting OOB in chair w/all needs within reach & RN/CM informed of tx outcome       Abdulaziz Justice, PT

## 2019-09-05 NOTE — NURSING NOTE
Patient rang call bell at this time, anxious to go home today  "Worried about my dog "  States son will pick her up if she can be discharged  Spoke with Dr Titus Pereira at this time  He will speak with neurology

## 2019-09-05 NOTE — NURSING NOTE
Patient discharged home with grandson at this time  Reviewed discharge instructions, expressed understanding of same

## 2019-09-05 NOTE — UTILIZATION REVIEW
Initial Clinical Review    Admission: Date/Time/Statement: Inpatient Admission Orders (From admission, onward)     Ordered        09/04/19 0959  Inpatient Admission (expected length of stay for this patient Order details is greater than two midnights)  Once                   Orders Placed This Encounter   Procedures    Inpatient Admission (expected length of stay for this patient Order details is greater than two midnights)     Standing Status:   Standing     Number of Occurrences:   1     Order Specific Question:   Admitting Physician     Answer:   Shelia Hart [[id number]]     Order Specific Question:   Level of Care     Answer:   Critical Care [15]     Order Specific Question:   Estimated length of stay     Answer:   More than 2 Midnights     Order Specific Question:   Certification     Answer:   I certify that inpatient services are medically necessary for this patient for a duration of greater than two midnights  See H&P and MD Progress Notes for additional information about the patient's course of treatment  ED Arrival Information     Expected Arrival Acuity Means of Arrival Escorted By Service Admission Type    - 9/4/2019 08:03 [facility] Critical Care/ICU Emergency    Arrival Complaint    weakness        Chief Complaint   Patient presents with    Weakness - Generalized     partient called EMS for fall and inability to get up  used life alert  Upon arrival to ED patient was no longer able to  move right arm or leg  Patient has no facial deficit or aphasia  Stroke alert called at this time   STROKE Alert     Assessment/Plan: 77 y/o female presents to ED from home by EMS with dizziness, weakness, unable to get up  Hx HTN, CVA/TIA  In ED, pt developed R arm and leg weakness  Stroke alert called  NIHSS 3  Admitted as inpatient to Critical Care due to TIA, HTN  Stroke workup  Not tPA candidate  MRI brain  Neuro consult  PT/OT/Speech  Allow permissive HTN    9/4 Per neuro, R sided weakness, hypertensive emergency with or without acute lacunar infarct  Admit for stroke workup  Reduce bp slowly  No tPA due to outside window, low NIHSS, bp above parameters  9/5 Acute CVA per MRI  Pt c/o headache  Weakness improved  NIHSS now 0   BP improving  Continue lisinopril and atenolol      ED Triage Vitals [09/04/19 0805]   Temperature Pulse Respirations Blood Pressure SpO2   97 7 °F (36 5 °C) 70 20 (!) 220/98 96 %      Temp Source Heart Rate Source Patient Position - Orthostatic VS BP Location FiO2 (%)   Temporal Monitor Lying Left arm --      Pain Score       No Pain        Wt Readings from Last 1 Encounters:   09/04/19 68 kg (150 lb)     Additional Vital Signs:   09/05/19 0800  76 23Abnormal  173/76Abnormal  90 % None (Room air)   09/05/19 0700 98 7 °F (37 1 °C) 66 22 184/74Abnormal  91 % None (Room air)   09/05/19 0600  71 18 165/71 90 %    09/05/19 0500  72 24Abnormal  156/65 91 %    09/05/19 0400 98 °F (36 7 °C) 67 15 158/69 90 %    09/05/19 0300  67 20 171/72Abnormal  88 %Abnormal     09/05/19 0200  65 19 179/73Abnormal  89 %Abnormal     09/05/19 0100  67 18 171/74Abnormal  88 %Abnormal     09/05/19 0000  70 21 169/72 88 %Abnormal     09/04/19 2300  68 19 144/63 90 %    09/04/19 2200  184Abnormal  16 144/70 90 %    09/04/19 2000  71 20 191/78Abnormal  90 %    09/04/19 1900  68 22 185/74Abnormal  89 %Abnormal     09/04/19 1800  64 26Abnormal  141/63 91 % None (Room air)   09/04/19 1700  82 29Abnormal  148/100 92 % None (Room air)   09/04/19 1600  67 29Abnormal  157/67 91 % None (Room air)   09/04/19 1500 98 2 °F (36 8 °C) 60 22 182/77Abnormal  90 % None (Room air)   09/04/19 1400  62 19 190/79Abnormal  92 % None (Room air)   09/04/19 1300  65 22 170/72 92 % None (Room air)   09/04/19 1200  63 21 152/68 90 % None (Room air)   09/04/19 1145  60 19 184/78Abnormal  90 % None (Room air)   09/04/19 1130  66 24Abnormal  182/72Abnormal  90 % None (Room air)   09/04/19 1115  60 15 164/70 91 % None (Room air)   09/04/19 1100  62 21 174/74Abnormal  91 % None (Room air)   09/04/19 1045  62 22 191/77Abnormal  90 % None (Room air)   09/04/19 1030 98 5 °F (36 9 °C) 62 24Abnormal  191/76Abnormal  92 % None (Room air)   09/04/19 1000  60 20 150/65 95 %    09/04/19 0945  69 19 141/63 94 %    09/04/19 0930  66 24Abnormal  144/74 94 %    09/04/19 0915  71 21 170/68 94 %    09/04/19 0900  113Abnormal  42Abnormal  157/88 94 %    09/04/19 0830  85  210/86Abnormal  95 %        Pertinent Labs/Diagnostic Test Results:   Results from last 7 days   Lab Units 09/05/19  0439 09/04/19  0812   WBC Thousand/uL 7 80 8 90   HEMOGLOBIN g/dL 12 5 12 4   HEMATOCRIT % 38 6* 38 2*   PLATELETS Thousands/uL 335 362   NEUTROS ABS Thousands/µL 4 50  --      Results from last 7 days   Lab Units 09/05/19  0439 09/04/19  0812   SODIUM mmol/L 139 139   POTASSIUM mmol/L 3 4* 3 6   CHLORIDE mmol/L 102 101   CO2 mmol/L 27 30   ANION GAP mmol/L 10 8   BUN mg/dL 18 22   CREATININE mg/dL 1 05 1 16   EGFR ml/min/1 73sq m 51 45   CALCIUM mg/dL 9 5 9 2     Results from last 7 days   Lab Units 09/04/19  0806   POC GLUCOSE mg/dl 90     Results from last 7 days   Lab Units 09/05/19  0439 09/04/19  0812   GLUCOSE RANDOM mg/dL 101* 99     Results from last 7 days   Lab Units 09/04/19  0812   PROTIME seconds 12 5   INR  1 08   PTT seconds 32     9/4 EKG:  Normal sinus rhythm  Possible Anterior infarct , age undetermined    9/4 CXR:  Interstitial lung disease  9/4:  CT brain stroke alert:  No acute intracranial hemorrhage  Chronic biparietal cortical infarctions  Chronic lacunar infarction within the left basal ganglia extending into the corona radiata  Generalized atrophy and mild cerebral chronic microangiopathic disease with intracranial vertebral and carotid atherosclerotic disease  9/4 CTA head/neck stroke alert:  Mild cervical ICA origin stenosis due to atherosclerotic disease    Moderate focal stenosis in the left cavernous ICA due to atherosclerotic plaque  Patent anterior and posterior intracranial circulation  Interstitial edema biapical paraseptal emphysematous disease with question of underlying chronic lung disease  9/4 MRI brain:  Tiny 1 mm focus of possible diffusion restriction in the left juanita could relate to acute ischemia  Old infarcts     9/4 Echo:  LEFT VENTRICLE:  Systolic function was normal  Ejection fraction was estimated to be 65 %  There were no regional wall motion abnormalities  There was mild concentric hypertrophy  Doppler parameters were consistent with abnormal left ventricular relaxation (grade 1 diastolic dysfunction)    RIGHT VENTRICLE:  Systolic function was normal    MITRAL VALVE:  There was mild annular calcification  There was trace regurgitation    TRICUSPID VALVE:  There was trace regurgitation    PULMONIC VALVE:  There was trace regurgitation   PERICARDIUM:  There was no pericardial effusion      ED Treatment:   Medication Administration from 09/04/2019 0803 to 09/04/2019 1025       Date/Time Order Dose Route Action     09/04/2019 0843 Labetalol HCl (NORMODYNE) injection 20 mg 20 mg Intravenous Given     09/04/2019 0903 lisinopril (ZESTRIL) tablet 20 mg 20 mg Oral Given     09/04/2019 0926 clopidogrel (PLAVIX) tablet 75 mg 75 mg Oral Given        Past Medical History:   Diagnosis Date    Chronic kidney disease     Dementia     Gout     Hypertension     Osteoporosis      Present on Admission:   [facility]cerebro[facility]) [facility] [address] )   Gout   Hypertension   Mixed hyperlipidemia      Admitting Diagnosis: TIA (transient ischemic attack) [G45 9]  Hypertensive crisis [I16 9]  Weakness [R53 1]  Age/Sex: 78 y o  female  Admission Orders:    Current Facility-Administered Medications:  acetaminophen 650 mg Oral Q6H PRN x1   allopurinol 100 mg Oral Daily   atenolol 25 mg Oral Daily   clopidogrel 75 mg Oral Daily   heparin (porcine) 5,000 Units Subcutaneous Q8H [facility]   lisinopril 40 mg Oral Daily   ondansetron 4 mg Intravenous Q6H PRN   pentoxifylline 400 mg Oral BID With Meals   pravastatin 80 mg Oral Daily With Dinner       IP CONSULT TO NEUROLOGY   VS  Neuro checks  Dysphagia assessment  PT/OT  Speech eval    Network Utilization Review Department  Phone: [phone]; Fax [phone]  [email or web address]  ATTENTION: Please call with any questions or concerns to [phone]  and carefully listen to the prompts so that you are directed to the right person  Send all requests for admission clinical reviews, approved or denied determinations and any other requests to fax [phone]   All voicemails are confidential

## 2019-09-05 NOTE — DISCHARGE SUMMARY
Discharge- Alireza Draper 1940, 78 y o  female MRN: [id number]    Unit/Bed#: ICU 02 Encounter: [id number]    Primary Care Provider: Lilli Alvarado DO   Date and time admitted to hospital: 9/4/2019  8:05 AM        * Acute CVA (cerebrovascular accident) [facility])  Assessment & Plan  · Patient with lacunar infarct seen on MRI  · Neurology input appreciated  · Recommendation was for patient to switch from simvastatin to atorvastatin 40 mg daily  · Continue Plavix  · PT/OT eval appreciated  · Echo result appreciated  · Patient is back to baseline  · Patient stable for discharge with outpatient follow-up    Hypertension  Assessment & Plan  · BP elevated today  · Will resume home HCTZ  · Continue atenolol/lisinopril  · Further management per PCP as outpatient    Mixed hyperlipidemia  Assessment & Plan  · Neurology recommended switching patient to atorvastatin 40 mg daily    Gout  Assessment & Plan  · Continue allopurinol      Discharging Physician / Practitioner: Shayna Valencia MD  PCP: Lilli Alvarado DO  Admission Date:   Admission Orders (From admission, onward)     Ordered        09/04/19 0959  Inpatient Admission (expected length of stay for this patient Order details is greater than two midnights)  Once                   Discharge Date: 09/05/19    Resolved Problems  Date Reviewed: 8/14/2019    None        PATIENT IMPROVED SOONER THAN ANTICIPATED  INITIALLY ANTICIPATED FOR 2 MIDNIGHT STAY HOWEVER DUE TO SIGNIFICANT IMPROVEMENT AND PATIENT REQUESTING TO BE DISCHARGED HOME PATIENT WAS STABLE TODAY AND NEUROLOGY CLEARED PATIENT FOR DISCHARGE HOME WITH OUTPATIENT FOLLOW-UP  Consultations During Hospital Stay:  · Neurology    Procedures Performed:   · None    Significant Findings / Test Results:   · Acute CVA    Incidental Findings:   · None     Test Results Pending at Discharge (will require follow up):    · None     Outpatient Tests Requested:  · Follow-up BMP with PCP    Complications:  None    Reason for Admission:  CVA    Hospital Course: Charlotte Yee is a 78 y o  female patient who originally presented to the hospital on 9/4/2019 due to right-sided weakness  Patient was admitted to the ICU for suspected CVA  MRI confirmed small acute lacunar infarct  Patient was seen by Neurology today who recommended switching patient from simvastatin to atorvastatin 40 mg daily  Neurology also recommended continuing Plavix at home and getting better blood pressure control  Patient's HCTZ which was held on admission was resumed  Patient advised to continue her lisinopril/atenolol  Patient should follow up with PCP for further BP management  Patient did well with physical therapy who recommended home without need for physical therapy  Patient currently back to baseline  Tolerating diet  Ambulating well  Stable for discharge home with outpatient follow-up  PLEASE SEE TODAY'S PROGRESS NOTE FOR FURTHER DETAILS REGARDING ASSESSMENT/PLAN AS WELL AS TODAY'S PHYSICAL EXAM     Please see above list of diagnoses and related plan for additional information  Condition at Discharge: stable     Discharge Day Visit / Exam:     * Please refer to separate progress note for these details *    Discussion with Family:  No family at bedside during my evaluation    Discharge instructions/Information to patient and family:   See after visit summary for information provided to patient and family  Provisions for Follow-Up Care:  See after visit summary for information related to follow-up care and any pertinent home health orders  Disposition:  Home    For Discharges to [facility]:   · Not Applicable to this Patient - Not Applicable to this Patient    Planned Readmission: no     Discharge Statement:  I spent 35 minutes discharging the patient  This time was spent on the day of discharge  I had direct contact with the patient on the day of discharge   Greater than 50% of the total time was spent examining patient, answering all patient questions, arranging and discussing plan of care with patient as well as directly providing post-discharge instructions  Additional time then spent on discharge activities  Discharge Medications:  See after visit summary for reconciled discharge medications provided to patient and family        ** Please Note: This note has been constructed using a voice recognition system **

## 2019-09-05 NOTE — NURSING NOTE
Spoke with son Bob Tim he is aware patient is being discharged today, will send grandson to transport patient home

## 2019-09-05 NOTE — UTILIZATION REVIEW
Notification of Inpatient Admission/Inpatient Authorization Request  This is a Notification of Inpatient Admission/Request for Inpatient Authorization for our facility [address]  Be advised that this patient was admitted to our facility under Inpatient Status  Please contact the Utilization Review Department where the patient is receiving care services for additional admission information  Place of Service Code: 24   Place of Service Name: Inpatient Hospital  Presentation Date & Time: 9/4/2019  8:05 AM  Inpatient Admission Date & Time: 9/4/19 9219  Discharge Date & Time: No discharge date for patient encounter  Discharge Disposition (if discharged): Final discharge disposition not confirmed  Attending Physician & NPI#: Nadeem Cueto Md [[id number] Physician:  IAN Kam  [facility] ID- [id number]  [address] [place] [address]  Phone 1: [phone]  Fax: [phone]     Admission Orders (From admission, onward)     Ordered        09/04/19 0959  Inpatient Admission (expected length of stay for this patient Order details is greater than two midnights)  Once                   Facility: [address] [place] [address] Drive Utilization Review Department  Phone: [phone]; Fax [phone]  [email or web address]  ATTENTION: Please call with any questions or concerns to [phone]  and carefully listen to the prompts so that you are directed to the right person  Send all requests for admission clinical reviews, approved or denied determinations and any other requests to fax [phone]   All voicemails are confidential

## 2019-09-05 NOTE — PLAN OF CARE
Problem: DISCHARGE PLANNING - CARE MANAGEMENT  Goal: Discharge to post-acute care or home with appropriate resources  Description  INTERVENTIONS:  - Conduct assessment to determine patient/family and health care team treatment goals, and need for post-acute services based on payer coverage, community resources, and patient preferences, and barriers to discharge  - Address psychosocial, clinical, and financial barriers to discharge as identified in assessment in conjunction with the patient/family and health care team  - Arrange appropriate level of post-acute services according to patient's   needs and preference and payer coverage in collaboration with the physician and health care team  - Communicate with and update the patient/family, physician, and health care team regarding progress on the discharge plan  - Arrange appropriate transportation to post-acute venues  Discharge home with family  Pt is reviewing [facility] agency information     Outcome: Progressing

## 2019-09-05 NOTE — ASSESSMENT & PLAN NOTE
· BP elevated today  · Will resume home HCTZ  · Continue atenolol/lisinopril  · Further management per PCP as outpatient

## 2019-09-05 NOTE — SOCIAL WORK
Pt walking around her ICU room  Pt reports she does not feel she needs any C services  Pt states she wants to get home to her dog  Pt discharged  Awaiting family to transport

## 2019-09-06 ENCOUNTER — TRANSITIONAL CARE MANAGEMENT (OUTPATIENT)
Dept: INTERNAL MEDICINE CLINIC | Facility: CLINIC | Age: 79
End: 2019-09-06

## 2019-09-06 ENCOUNTER — TELEPHONE (OUTPATIENT)
Dept: INTERNAL MEDICINE CLINIC | Facility: CLINIC | Age: 79
End: 2019-09-06

## 2019-09-06 NOTE — UTILIZATION REVIEW
Notification of Discharge  This is a Notification of Discharge from our facility [address]  Please be advised that this patient has been discharge from our facility  Below you will find the admission and discharge date and time including the patients disposition  PRESENTATION DATE: 9/4/2019  8:05 AM  OBS ADMISSION DATE:  IP ADMISSION DATE: 9/4/19 0959   DISCHARGE DATE: 9/5/2019  4:25 PM  DISPOSITION: Home/Self Care Home/Self Care   Admission Orders listed below:  Admission Orders (From admission, onward)     Ordered        09/04/19 0959  Inpatient Admission (expected length of stay for this patient Order details is greater than two midnights)  Once                   Please contact the UR Department if additional information is required to close this patient's authorization/case  [address]  Utilization Review Department  Phone: [phone]; Fax [phone]  [email or web address]  ATTENTION: Please call with any questions or concerns to [phone]  and carefully listen to the prompts so that you are directed to the right person  Send all requests for admission clinical reviews, approved or denied determinations and any other requests to fax [phone]   All voicemails are confidential

## 2019-09-06 NOTE — TELEPHONE ENCOUNTER
Patient was discharged from [facility] on 9/5  Can we schedule a TCM appt and re route back to us to finish TCM note  Thank you

## 2019-09-18 ENCOUNTER — OFFICE VISIT (OUTPATIENT)
Dept: INTERNAL MEDICINE CLINIC | Facility: CLINIC | Age: 79
End: 2019-09-18
Payer: COMMERCIAL

## 2019-09-18 VITALS
DIASTOLIC BLOOD PRESSURE: 74 MMHG | TEMPERATURE: 99.2 F | HEART RATE: 78 BPM | HEIGHT: 64 IN | BODY MASS INDEX: 25.61 KG/M2 | WEIGHT: 150 LBS | SYSTOLIC BLOOD PRESSURE: 150 MMHG | RESPIRATION RATE: 16 BRPM

## 2019-09-18 DIAGNOSIS — R53.1 RIGHT SIDED WEAKNESS: ICD-10-CM

## 2019-09-18 DIAGNOSIS — Z86.73 CEREBROVASCULAR DISEASE, ARTERIOSCLEROTIC, POST-STROKE: ICD-10-CM

## 2019-09-18 DIAGNOSIS — E78.2 MIXED HYPERLIPIDEMIA: ICD-10-CM

## 2019-09-18 DIAGNOSIS — Z23 ENCOUNTER FOR VACCINATION: ICD-10-CM

## 2019-09-18 DIAGNOSIS — I67.2 CEREBROVASCULAR DISEASE, ARTERIOSCLEROTIC, POST-STROKE: ICD-10-CM

## 2019-09-18 DIAGNOSIS — N18.30 CKD (CHRONIC KIDNEY DISEASE), STAGE III (HCC): ICD-10-CM

## 2019-09-18 DIAGNOSIS — Z13.31 NEGATIVE DEPRESSION SCREENING: ICD-10-CM

## 2019-09-18 DIAGNOSIS — I73.9 PERIPHERAL VASCULAR DISEASE (HCC): ICD-10-CM

## 2019-09-18 DIAGNOSIS — I10 ESSENTIAL HYPERTENSION: ICD-10-CM

## 2019-09-18 DIAGNOSIS — I63.9 ACUTE CVA (CEREBROVASCULAR ACCIDENT) (HCC): Primary | ICD-10-CM

## 2019-09-18 PROCEDURE — 90662 IIV NO PRSV INCREASED AG IM: CPT | Performed by: INTERNAL MEDICINE

## 2019-09-18 PROCEDURE — 99495 TRANSJ CARE MGMT MOD F2F 14D: CPT | Performed by: INTERNAL MEDICINE

## 2019-09-18 PROCEDURE — G0008 ADMIN INFLUENZA VIRUS VAC: HCPCS | Performed by: INTERNAL MEDICINE

## 2019-09-18 RX ORDER — ATENOLOL 50 MG/1
50 TABLET ORAL DAILY
Qty: 90 TABLET | Refills: 3 | Status: SHIPPED | OUTPATIENT
Start: 2019-09-18 | End: 2020-04-17 | Stop reason: SDUPTHER

## 2019-09-18 NOTE — PROGRESS NOTES
Assessment/Plan:     No problem-specific Assessment & Plan notes found for this encounter  Diagnoses and all orders for this visit:    Acute CVA (cerebrovascular accident) ([facility] [address] )  -     atenolol (TENORMIN) 50 mg tablet; Take 1 tablet (50 mg total) by mouth daily    Cerebrovascular disease, arteriosclerotic, post-stroke    Essential hypertension  -     atenolol (TENORMIN) 50 mg tablet; Take 1 tablet (50 mg total) by mouth daily    Peripheral vascular disease ([facility] [address] )    Mixed hyperlipidemia    [facility]    Negative depression screening    Encounter for vaccination  -     influenza vaccine, high-dose, PF 0 5 mL    Right sided weakness     A/P: Doing well and no significant deficits  BP is still up  Will increase the atenolol to 50 bid  May need additional adjustments  Discussed modifying her risks by controlling her BP,Sugars, and cholesterol  Continue current meds  Will up date her flu vaccine  Pt and relative relate a tough domestic environment due to family differences  Discussed pt getting out of the house more to be with other people her age and also for more PT, but reports her son won let her  Will watch closely  May need to have the  evaluate further  RTC five weeks for routine and recheck the BP  Subjective:     Patient ID: Bernadette Villagran is a 78 y o  female  WF presents with her relative for f/u brief admission for acute onset right sided weakness  Pt with HTN, hyperlipidemia, PAD, etc  Seen in the ER and MRI showed a Lacunar infarct  Treated conservatively and improved  Had some residual right side weakness, but declined inpt rehab  Statins changed  Since d/c, doing better with some fatigue and right sided weakness slowly improving  NO falls  Gait is steady  No headaches  Compliant with meds  Activity and diet improving  Tolerating her meds  Review of Systems   Constitutional: Negative for activity change, chills, diaphoresis, fatigue and fever  HENT: Negative for trouble swallowing  Eyes: Negative for visual disturbance  Respiratory: Negative for cough, chest tightness, shortness of breath and wheezing  Cardiovascular: Negative for chest pain, palpitations and leg swelling  Gastrointestinal: Negative for abdominal pain, constipation, diarrhea, nausea and vomiting  Endocrine: Negative for cold intolerance and heat intolerance  Genitourinary: Negative for difficulty urinating, dysuria and frequency  Musculoskeletal: Negative for arthralgias, gait problem and myalgias  Neurological: Positive for weakness  Negative for dizziness, tremors, seizures, syncope, facial asymmetry, speech difficulty, light-headedness, numbness and headaches  Psychiatric/Behavioral: Negative for confusion, dysphoric mood and sleep disturbance  The patient is not nervous/anxious  Objective:     Physical Exam   Constitutional: She is oriented to person, place, and time  She appears well-developed and well-nourished  No distress  HENT:   Head: Normocephalic and atraumatic  Mouth/Throat: Oropharynx is clear and moist    Eyes: Pupils are equal, round, and reactive to light  Conjunctivae and EOM are normal    Neck: Normal range of motion  Neck supple  No JVD present  No tracheal deviation present  No thyromegaly present  Cardiovascular: Normal rate, regular rhythm and normal heart sounds  Pulmonary/Chest: Effort normal and breath sounds normal  No respiratory distress  She has no wheezes  She has no rales  Abdominal: Soft  Bowel sounds are normal  She exhibits no distension  There is no tenderness  There is no guarding  Musculoskeletal: She exhibits no edema, tenderness or deformity  MS w/o gross deformities  Strength 5/5 with tone/rom wnl  DTR 1/4 with distraction  NO gait abnormalities  Lymphadenopathy:     She has no cervical adenopathy  Neurological: She is alert and oriented to person, place, and time  She displays normal reflexes   No cranial nerve deficit  She exhibits normal muscle tone  Coordination normal    Psychiatric: She has a normal mood and affect  Her behavior is normal  Judgment and thought content normal    Nursing note and vitals reviewed  Vitals:    09/18/19 1451   BP: 150/74   Pulse: 78   Resp: 16   Temp: 99 2 °F (37 3 °C)   TempSrc: Tympanic   Weight: 68 kg (150 lb)   Height: 5' 4" (1 626 m)       Transitional Care Management Review: Yumiko Lee is a 78 y o  female here for TCM follow up  During the TCM phone call patient stated:    TCM Call (since 8/18/2019)     Date and time call was made  9/9/2019  8:41 AM    Hospital care reviewed  Records reviewed    Patient was hospitialized at  [address] [place] [address]    Date of Admission  09/04/19    Date of discharge  09/05/19    Diagnosis  CVA    Disposition  Home    Were the patients medications reviewed and updated  Yes    Current Symptoms  None      TCM Call (since 8/18/2019)     Post hospital issues  None    Should patient be enrolled in anticoag monitoring? No    Scheduled for follow up?   Yes    Did you obtain your prescribed medications  Yes    Do you need help managing your prescriptions or medications  No    Is transportation to your appointment needed  No    I have advised the patient to call PCP with any new or worsening symptoms  Nurme 2  Family    The type of support provided  Physical; Emotional; Financial    Do you have social support  Yes, as much as I need    Are you recieving any outpatient services  No    Are you recieving home care services  No    Are you using any community resources  No    Current waiver services  No    Have you fallen in the last 12 months  No    Interperter language line needed  No    Counseling  Patient          Hailey Hernandez, DO

## 2019-10-17 DIAGNOSIS — G45.9 TIA (TRANSIENT ISCHEMIC ATTACK): ICD-10-CM

## 2019-10-17 RX ORDER — ATORVASTATIN CALCIUM 40 MG/1
40 TABLET, FILM COATED ORAL DAILY
Qty: 90 TABLET | Refills: 5 | Status: SHIPPED | OUTPATIENT
Start: 2019-10-17 | End: 2020-06-15 | Stop reason: SDUPTHER

## 2019-12-05 ENCOUNTER — OFFICE VISIT (OUTPATIENT)
Dept: URGENT CARE | Facility: CLINIC | Age: 79
End: 2019-12-05
Payer: COMMERCIAL

## 2019-12-05 VITALS
OXYGEN SATURATION: 97 % | DIASTOLIC BLOOD PRESSURE: 76 MMHG | HEART RATE: 96 BPM | BODY MASS INDEX: 25.61 KG/M2 | SYSTOLIC BLOOD PRESSURE: 144 MMHG | WEIGHT: 150 LBS | TEMPERATURE: 97.4 F | RESPIRATION RATE: 16 BRPM | HEIGHT: 64 IN

## 2019-12-05 DIAGNOSIS — J01.90 ACUTE SINUSITIS, RECURRENCE NOT SPECIFIED, UNSPECIFIED LOCATION: Primary | ICD-10-CM

## 2019-12-05 PROCEDURE — 99213 OFFICE O/P EST LOW 20 MIN: CPT | Performed by: PHYSICIAN ASSISTANT

## 2019-12-05 PROCEDURE — S9088 SERVICES PROVIDED IN URGENT: HCPCS | Performed by: PHYSICIAN ASSISTANT

## 2019-12-05 RX ORDER — AZITHROMYCIN 250 MG/1
TABLET, FILM COATED ORAL
Qty: 6 TABLET | Refills: 0 | Status: SHIPPED | OUTPATIENT
Start: 2019-12-05 | End: 2019-12-10

## 2019-12-05 NOTE — PATIENT INSTRUCTIONS
Hydration and rest  Humidifier at night  Use otc cold medications  Go to ER if symptoms worsen or you have difficulty breathing

## 2019-12-05 NOTE — PROGRESS NOTES
[facility]        NAME: Isabelle Hall is a 78 y o  female  : 1940    MRN: [id number]  DATE: 2019  TIME: 12:50 PM    Assessment and Plan   Acute sinusitis, recurrence not specified, unspecified location [J01 90]  1  Acute sinusitis, recurrence not specified, unspecified location  azithromycin (ZITHROMAX) 250 mg tablet         Patient Instructions     Patient Instructions   Hydration and rest  Humidifier at night  Use otc cold medications  Go to ER if symptoms worsen or you have difficulty breathing  Follow up with PCP in 3-5 days  Proceed to  ER if symptoms worsen  Chief Complaint     Chief Complaint   Patient presents with    Cough     x 3 days    Cold Like Symptoms         History of Present Illness       69yo female c/o URI sx x 3 days  Pt reports hx of sinus pressure, dry cough  Not taking any otc medications  No fever, sob, cp, n/v/d  Tolerating hydration and food  Review of Systems   Review of Systems   Constitutional: Negative for chills, fatigue and fever  HENT: Positive for congestion, postnasal drip, rhinorrhea and sinus pressure  Negative for ear pain, sore throat and voice change  Eyes: Negative for discharge and redness  Respiratory: Positive for cough (dry)  Negative for shortness of breath and wheezing  Cardiovascular: Negative for chest pain  Gastrointestinal: Negative for diarrhea, nausea and vomiting  Musculoskeletal: Negative for myalgias  Neurological: Negative for dizziness and headaches  Hematological: Negative for adenopathy           Current Medications       Current Outpatient Medications:     alendronate (FOSAMAX) 70 mg tablet, Take 1 tablet (70 mg total) by mouth once a week, Disp: 12 tablet, Rfl: 3    allopurinol (ZYLOPRIM) 100 mg tablet, Take 1 tablet (100 mg total) by mouth daily, Disp: 90 tablet, Rfl: 1    atenolol (TENORMIN) 50 mg tablet, Take 1 tablet (50 mg total) by mouth daily, Disp: 90 tablet, Rfl: 3   atorvastatin (LIPITOR) 40 mg tablet, Take 1 tablet (40 mg total) by mouth daily, Disp: 90 tablet, Rfl: 5    clopidogrel (PLAVIX) 75 mg tablet, Take 1 tablet (75 mg total) by mouth daily, Disp: 90 tablet, Rfl: 1    hydrochlorothiazide (HYDRODIURIL) 50 mg tablet, Take 1 tablet (50 mg total) by mouth daily, Disp: 90 tablet, Rfl: 1    lisinopril (ZESTRIL) 40 mg tablet, Take 1 tablet (40 mg total) by mouth daily, Disp: 90 tablet, Rfl: 1    Multiple Vitamins-Minerals (MULTI ADULT GUMMIES) CHEW, Chew 1 tablet daily, Disp: , Rfl:     OMEGA-3 FATTY ACIDS-VITAMIN E PO, Take 1,000 mg by mouth, Disp: , Rfl:     pentoxifylline (TRENtal) 400 mg ER tablet, Take 1 tablet (400 mg total) by mouth 3 (three) times a day with meals, Disp: 90 tablet, Rfl: 1    azithromycin (ZITHROMAX) 250 mg tablet, Take 2 tablets first day, then 1 tablet daily for 5 days, Disp: 6 tablet, Rfl: 0    donepezil (ARICEPT) 5 mg tablet, Take 1 tablet (5 mg total) by mouth daily at bedtime (Patient not taking: Reported on 9/18/2019), Disp: 90 tablet, Rfl: 3    Current Allergies     Allergies as of 12/05/2019 - Reviewed 12/05/2019   Allergen Reaction Noted    Oxycodone-acetaminophen Rash 03/21/2008            The following portions of the patient's history were reviewed and updated as appropriate: allergies, current medications, past family history, past medical history, past social history, past surgical history and problem list      Past Medical History:   Diagnosis Date    [facility]ce[facility]) [facility] [address] [facility]     Chronic kidney disease     Dementia ([facility] [address] [facility]     Gout     Hypertension     Osteoporosis        Past Surgical History:   Procedure Laterality Date    APPENDECTOMY      HYSTERECTOMY      TUBAL LIGATION      UTERINE FIBROID SURGERY         Family History   Problem Relation Age of Onset    Stroke Mother     Other Father         Coal workers' pneumoconiosis    Hypertension Father          Medications have been verified  Objective   /76   Pulse 96   Temp (!) 97 4 °F (36 3 °C)   Resp 16   Ht 5' 4" (1 626 m)   Wt 68 kg (150 lb)   SpO2 97%   BMI 25 75 kg/m²        Physical Exam     Physical Exam   Constitutional: She appears well-developed and well-nourished  HENT:   Head: Normocephalic and atraumatic  Right Ear: Tympanic membrane and ear canal normal    Left Ear: Tympanic membrane and ear canal normal    Nose: Mucosal edema and rhinorrhea (purulent R nostril ) present  Right sinus exhibits frontal sinus tenderness  Left sinus exhibits frontal sinus tenderness  Mouth/Throat: Uvula is midline  No oropharyngeal exudate or posterior oropharyngeal erythema  Eyes: Pupils are equal, round, and reactive to light  Cardiovascular: Normal rate, regular rhythm and normal heart sounds  Pulmonary/Chest: Effort normal and breath sounds normal  No respiratory distress  She has no wheezes  She has no rales  Abdominal: Soft  Skin: Skin is warm  No rash noted

## 2019-12-16 ENCOUNTER — OFFICE VISIT (OUTPATIENT)
Dept: INTERNAL MEDICINE CLINIC | Facility: CLINIC | Age: 79
End: 2019-12-16
Payer: COMMERCIAL

## 2019-12-16 VITALS
RESPIRATION RATE: 18 BRPM | TEMPERATURE: 98 F | BODY MASS INDEX: 25.27 KG/M2 | SYSTOLIC BLOOD PRESSURE: 138 MMHG | WEIGHT: 148 LBS | DIASTOLIC BLOOD PRESSURE: 70 MMHG | HEART RATE: 80 BPM | HEIGHT: 64 IN

## 2019-12-16 DIAGNOSIS — J30.2 SEASONAL ALLERGIES: ICD-10-CM

## 2019-12-16 DIAGNOSIS — M1A.9XX0 CHRONIC GOUT WITHOUT TOPHUS, UNSPECIFIED CAUSE, UNSPECIFIED SITE: ICD-10-CM

## 2019-12-16 DIAGNOSIS — M81.0 AGE-RELATED OSTEOPOROSIS WITHOUT CURRENT PATHOLOGICAL FRACTURE: ICD-10-CM

## 2019-12-16 DIAGNOSIS — Z86.73 CEREBROVASCULAR DISEASE, ARTERIOSCLEROTIC, POST-STROKE: ICD-10-CM

## 2019-12-16 DIAGNOSIS — M35.3 POLYMYALGIA RHEUMATICA (HCC): ICD-10-CM

## 2019-12-16 DIAGNOSIS — I67.2 CEREBROVASCULAR DISEASE, ARTERIOSCLEROTIC, POST-STROKE: ICD-10-CM

## 2019-12-16 DIAGNOSIS — T78.40XA ALLERGIC STATE, INITIAL ENCOUNTER: ICD-10-CM

## 2019-12-16 DIAGNOSIS — I10 ESSENTIAL HYPERTENSION: Primary | ICD-10-CM

## 2019-12-16 DIAGNOSIS — E78.2 MIXED HYPERLIPIDEMIA: ICD-10-CM

## 2019-12-16 DIAGNOSIS — I73.9 PERIPHERAL VASCULAR DISEASE (HCC): ICD-10-CM

## 2019-12-16 PROCEDURE — 99214 OFFICE O/P EST MOD 30 MIN: CPT | Performed by: INTERNAL MEDICINE

## 2019-12-16 PROCEDURE — 1036F TOBACCO NON-USER: CPT | Performed by: INTERNAL MEDICINE

## 2019-12-16 PROCEDURE — 3075F SYST BP GE 130 - 139MM HG: CPT | Performed by: INTERNAL MEDICINE

## 2019-12-16 PROCEDURE — 3078F DIAST BP <80 MM HG: CPT | Performed by: INTERNAL MEDICINE

## 2019-12-16 RX ORDER — FLUTICASONE PROPIONATE 50 MCG
1 SPRAY, SUSPENSION (ML) NASAL DAILY
Qty: 16 G | Refills: 0 | Status: SHIPPED | OUTPATIENT
Start: 2019-12-16 | End: 2020-11-27

## 2019-12-16 RX ORDER — LORATADINE 10 MG/1
10 TABLET ORAL DAILY
Qty: 90 TABLET | Refills: 0 | Status: SHIPPED | OUTPATIENT
Start: 2019-12-16 | End: 2021-06-07

## 2019-12-16 NOTE — PROGRESS NOTES
Assessment/Plan:  Problem List Items Addressed This Visit        Cardiovascular and Mediastinum    Hypertension - Primary    Relevant Orders    Microalbumin / creatinine urine ratio    Cerebrovascular disease, arteriosclerotic, post-stroke    Peripheral vascular disease (HCC)       Musculoskeletal and Integument    Osteoporosis    Relevant Orders    Vitamin D 25 hydroxy       Other    Gout    Relevant Orders    Uric acid    Mixed hyperlipidemia    Polymyalgia rheumatica ([facility] [address] )      Other Visit Diagnoses     Seasonal allergies        Allergic state, initial encounter        Relevant Medications    loratadine (CLARITIN) 10 mg tablet    fluticasone (FLONASE) 50 mcg/act nasal spray           Diagnoses and all orders for this visit:    Essential hypertension  -     Microalbumin / creatinine urine ratio    Mixed hyperlipidemia    Cerebrovascular disease, arteriosclerotic, post-stroke    Peripheral vascular disease (HCC)    Age-related osteoporosis without current pathological fracture  -     Vitamin D 25 hydroxy; Future    Chronic gout without tophus, unspecified cause, unspecified site  -     Uric acid; Future    Polymyalgia rheumatica (HCC)    Seasonal allergies    Allergic state, initial encounter  -     loratadine (CLARITIN) 10 mg tablet; Take 1 tablet (10 mg total) by mouth daily  -     fluticasone (FLONASE) 50 mcg/act nasal spray; 1 spray into each nostril daily        No problem-specific Assessment & Plan notes found for this encounter  A/P: Doing well and labs better  BP is better  Tolerating meds  Suspect allergies as the cause of her s/s  Will start anti-histamine and INS  If persists, consider a trial off ACEI and check  CXR and PFT's  Continue current treatment and RTC four months for routine  Subjective:      Patient ID: Carter Womack is a 78 y o  female  WF RTC for f/u htn, CVA, etc  Doing better, but continues with white nasal d/c and PND despite being on several coaurses of abx from the   Denies CP, SOB, palpitations, orthopnea, or PND  No gout attacks  No stroke like events  PMR is controlled  Due for labs  The following portions of the patient's history were reviewed and updated as appropriate:   She has a past medical history of Acute CVA (cerebrovascular accident) ([facility] [address] Chronic kidney disease, Dementia ([facility] [address] ), Gout, Hypertension, and Osteoporosis  ,  does not have any pertinent problems on file  ,   has a past surgical history that includes Appendectomy; Hysterectomy; Tubal ligation; and Uterine fibroid surgery  ,  family history includes Hypertension in her father; Other in her father; Stroke in her mother  ,   reports that she has quit smoking  She has never used smokeless tobacco  She reports that she does not drink alcohol or use drugs  ,  is allergic to oxycodone-acetaminophen     Current Outpatient Medications   Medication Sig Dispense Refill    alendronate (FOSAMAX) 70 mg tablet Take 1 tablet (70 mg total) by mouth once a week 12 tablet 3    allopurinol (ZYLOPRIM) 100 mg tablet Take 1 tablet (100 mg total) by mouth daily 90 tablet 1    atenolol (TENORMIN) 50 mg tablet Take 1 tablet (50 mg total) by mouth daily 90 tablet 3    atorvastatin (LIPITOR) 40 mg tablet Take 1 tablet (40 mg total) by mouth daily 90 tablet 5    clopidogrel (PLAVIX) 75 mg tablet Take 1 tablet (75 mg total) by mouth daily 90 tablet 1    donepezil (ARICEPT) 5 mg tablet Take 1 tablet (5 mg total) by mouth daily at bedtime 90 tablet 3    hydrochlorothiazide (HYDRODIURIL) 50 mg tablet Take 1 tablet (50 mg total) by mouth daily 90 tablet 1    lisinopril (ZESTRIL) 40 mg tablet Take 1 tablet (40 mg total) by mouth daily 90 tablet 1    Multiple Vitamins-Minerals (MULTI ADULT GUMMIES) CHEW Chew 1 tablet daily      OMEGA-3 FATTY ACIDS-VITAMIN E PO Take 1,000 mg by mouth      pentoxifylline (TRENtal) 400 mg ER tablet Take 1 tablet (400 mg total) by mouth 3 (three) times a day with meals 90 tablet 1    fluticasone (FLONASE) 50 mcg/act nasal spray 1 spray into each nostril daily 16 g 0    loratadine (CLARITIN) 10 mg tablet Take 1 tablet (10 mg total) by mouth daily 90 tablet 0     No current facility-administered medications for this visit  Review of Systems   Constitutional: Negative for activity change, chills, diaphoresis, fatigue and fever  HENT: Positive for congestion, postnasal drip and rhinorrhea  Negative for ear discharge, ear pain, sinus pressure, sinus pain and trouble swallowing  Respiratory: Negative for cough, chest tightness, shortness of breath and wheezing  Cardiovascular: Negative for chest pain, palpitations and leg swelling  Gastrointestinal: Negative for abdominal pain, constipation, diarrhea, nausea and vomiting  Genitourinary: Negative for difficulty urinating, dysuria and frequency  Musculoskeletal: Negative for arthralgias, gait problem and myalgias  Neurological: Positive for weakness  Negative for dizziness, seizures, syncope, light-headedness and headaches  Psychiatric/Behavioral: Negative for confusion and dysphoric mood  The patient is not nervous/anxious  PHQ-9 Depression Screening    PHQ-9:    Frequency of the following problems over the past two weeks:             Objective:  Vitals:    12/16/19 1701   BP: 138/70   Pulse: 80   Resp: 18   Temp: 98 °F (36 7 °C)   TempSrc: Tympanic   Weight: 67 1 kg (148 lb)   Height: 5' 4" (1 626 m)     Body mass index is 25 4 kg/m²  Physical Exam   Constitutional: She is oriented to person, place, and time  She appears well-developed and well-nourished  No distress  HENT:   Head: Normocephalic and atraumatic  Right Ear: External ear normal    Left Ear: External ear normal    Mouth/Throat: Oropharynx is clear and moist    NO sinus tenderness  Turbinates red and inflamed  Cobblestoning noted  Eyes: Pupils are equal, round, and reactive to light  Conjunctivae and EOM are normal    Neck: Neck supple  No JVD present  Cardiovascular: Normal rate, regular rhythm and normal heart sounds  Pulmonary/Chest: Effort normal and breath sounds normal  No respiratory distress  She has no wheezes  She has no rales  Abdominal: Soft  Bowel sounds are normal  She exhibits no distension  There is no tenderness  Musculoskeletal: She exhibits no edema  Lymphadenopathy:     She has no cervical adenopathy  Neurological: She is alert and oriented to person, place, and time  Psychiatric: She has a normal mood and affect  Her behavior is normal  Judgment and thought content normal    Nursing note and vitals reviewed

## 2019-12-20 ENCOUNTER — TELEPHONE (OUTPATIENT)
Dept: INTERNAL MEDICINE CLINIC | Facility: CLINIC | Age: 79
End: 2019-12-20

## 2019-12-20 NOTE — TELEPHONE ENCOUNTER
Refills     Pentoxifylline  400 mg  3x daily with meals     (hasn't been taking it 3x daily because she can't get to pharmacy every month)  Needs 3 mos supply      Liinopril 40 mg tablet 1 x daily      Hydrochlorothiazide  50 mg tablet  1 x daily  Alendronate 710 mg tablet  1 x weekly  Allopurinol  100 mg tablet   1 x daily      Atorvastatin  40 mg tablet   1 x daily    Clopidogrel  75 mg tablet  1 x daily    donepexil  5 mg tablet  1 x daily at bed time        Rohit bhatti

## 2019-12-21 DIAGNOSIS — M1A.9XX0 CHRONIC GOUT WITHOUT TOPHUS, UNSPECIFIED CAUSE, UNSPECIFIED SITE: ICD-10-CM

## 2019-12-21 DIAGNOSIS — I10 ESSENTIAL HYPERTENSION: ICD-10-CM

## 2019-12-21 DIAGNOSIS — I63.9 CEREBROVASCULAR ACCIDENT (CVA), UNSPECIFIED MECHANISM (HCC): ICD-10-CM

## 2019-12-22 RX ORDER — ALLOPURINOL 100 MG/1
TABLET ORAL
Qty: 90 TABLET | Refills: 0 | Status: SHIPPED | OUTPATIENT
Start: 2019-12-22 | End: 2020-03-18

## 2019-12-22 RX ORDER — CLOPIDOGREL BISULFATE 75 MG/1
TABLET ORAL
Qty: 90 TABLET | Refills: 0 | Status: SHIPPED | OUTPATIENT
Start: 2019-12-22 | End: 2020-03-18

## 2019-12-22 RX ORDER — LISINOPRIL 40 MG/1
TABLET ORAL
Qty: 90 TABLET | Refills: 0 | Status: SHIPPED | OUTPATIENT
Start: 2019-12-22 | End: 2020-03-18

## 2019-12-22 RX ORDER — PENTOXIFYLLINE 400 MG/1
TABLET, EXTENDED RELEASE ORAL
Qty: 90 TABLET | Refills: 0 | Status: SHIPPED | OUTPATIENT
Start: 2019-12-22 | End: 2020-03-18

## 2019-12-22 RX ORDER — HYDROCHLOROTHIAZIDE 50 MG/1
TABLET ORAL
Qty: 90 TABLET | Refills: 0 | Status: SHIPPED | OUTPATIENT
Start: 2019-12-22 | End: 2020-03-18

## 2019-12-24 ENCOUNTER — APPOINTMENT (OUTPATIENT)
Dept: LAB | Facility: CLINIC | Age: 79
End: 2019-12-24
Payer: COMMERCIAL

## 2019-12-24 DIAGNOSIS — M1A.9XX0 CHRONIC GOUT WITHOUT TOPHUS, UNSPECIFIED CAUSE, UNSPECIFIED SITE: ICD-10-CM

## 2019-12-24 DIAGNOSIS — M81.0 AGE-RELATED OSTEOPOROSIS WITHOUT CURRENT PATHOLOGICAL FRACTURE: ICD-10-CM

## 2019-12-24 LAB
25(OH)D3 SERPL-MCNC: 9.9 NG/ML (ref 30–100)
CREAT UR-MCNC: 206 MG/DL
MICROALBUMIN UR-MCNC: 25.5 MG/L (ref 0–20)
MICROALBUMIN/CREAT 24H UR: 12 MG/G CREATININE (ref 0–30)
URATE SERPL-MCNC: 4.6 MG/DL (ref 2–6.8)

## 2019-12-24 PROCEDURE — 82306 VITAMIN D 25 HYDROXY: CPT

## 2019-12-24 PROCEDURE — 84550 ASSAY OF BLOOD/URIC ACID: CPT

## 2019-12-24 PROCEDURE — 82570 ASSAY OF URINE CREATININE: CPT | Performed by: INTERNAL MEDICINE

## 2019-12-24 PROCEDURE — 36415 COLL VENOUS BLD VENIPUNCTURE: CPT

## 2019-12-24 PROCEDURE — 82043 UR ALBUMIN QUANTITATIVE: CPT | Performed by: INTERNAL MEDICINE

## 2019-12-26 DIAGNOSIS — E55.9 VITAMIN D DEFICIENCY: Primary | ICD-10-CM

## 2019-12-26 RX ORDER — ERGOCALCIFEROL 1.25 MG/1
50000 CAPSULE ORAL WEEKLY
Qty: 12 CAPSULE | Refills: 3 | Status: SHIPPED | OUTPATIENT
Start: 2019-12-26 | End: 2021-02-26 | Stop reason: SDUPTHER

## 2020-01-22 ENCOUNTER — TELEPHONE (OUTPATIENT)
Dept: INTERNAL MEDICINE CLINIC | Facility: CLINIC | Age: 80
End: 2020-01-22

## 2020-01-22 NOTE — TELEPHONE ENCOUNTER
----- Message from Honorio Ly DO sent at 12/16/2019  5:28 PM EST -----  Call pt in four weeks and get update on her allergies

## 2020-01-22 NOTE — TELEPHONE ENCOUNTER
Spoke with pt states not coughing at night, coughing once in a while, still brings up mucous, but feels the medication is working

## 2020-03-09 NOTE — PATIENT INSTRUCTIONS

## 2020-03-18 DIAGNOSIS — I10 ESSENTIAL HYPERTENSION: ICD-10-CM

## 2020-03-18 DIAGNOSIS — M1A.9XX0 CHRONIC GOUT WITHOUT TOPHUS, UNSPECIFIED CAUSE, UNSPECIFIED SITE: ICD-10-CM

## 2020-03-18 DIAGNOSIS — I63.9 CEREBROVASCULAR ACCIDENT (CVA), UNSPECIFIED MECHANISM (HCC): ICD-10-CM

## 2020-03-18 RX ORDER — HYDROCHLOROTHIAZIDE 50 MG/1
TABLET ORAL
Qty: 90 TABLET | Refills: 1 | Status: SHIPPED | OUTPATIENT
Start: 2020-03-18 | End: 2020-09-08 | Stop reason: SDUPTHER

## 2020-03-18 RX ORDER — CLOPIDOGREL BISULFATE 75 MG/1
TABLET ORAL
Qty: 90 TABLET | Refills: 1 | Status: SHIPPED | OUTPATIENT
Start: 2020-03-18 | End: 2020-09-08 | Stop reason: SDUPTHER

## 2020-03-18 RX ORDER — ALLOPURINOL 100 MG/1
TABLET ORAL
Qty: 90 TABLET | Refills: 1 | Status: SHIPPED | OUTPATIENT
Start: 2020-03-18 | End: 2020-09-08 | Stop reason: SDUPTHER

## 2020-03-18 RX ORDER — LISINOPRIL 40 MG/1
TABLET ORAL
Qty: 90 TABLET | Refills: 1 | Status: SHIPPED | OUTPATIENT
Start: 2020-03-18 | End: 2020-09-08 | Stop reason: SDUPTHER

## 2020-03-18 RX ORDER — PENTOXIFYLLINE 400 MG/1
TABLET, EXTENDED RELEASE ORAL
Qty: 90 TABLET | Refills: 1 | Status: SHIPPED | OUTPATIENT
Start: 2020-03-18 | End: 2021-02-26 | Stop reason: SDUPTHER

## 2020-03-18 NOTE — TELEPHONE ENCOUNTER
Requested medication(s) are due for refill today: Yes  Patient has already received a courtesy refill: No  Other reason request has been forwarded to provider: Please review

## 2020-04-17 ENCOUNTER — TELEMEDICINE (OUTPATIENT)
Dept: INTERNAL MEDICINE CLINIC | Facility: CLINIC | Age: 80
End: 2020-04-17
Payer: COMMERCIAL

## 2020-04-17 VITALS — DIASTOLIC BLOOD PRESSURE: 88 MMHG | SYSTOLIC BLOOD PRESSURE: 166 MMHG | HEART RATE: 81 BPM

## 2020-04-17 DIAGNOSIS — Z86.73 CEREBROVASCULAR DISEASE, ARTERIOSCLEROTIC, POST-STROKE: ICD-10-CM

## 2020-04-17 DIAGNOSIS — M1A.9XX0 CHRONIC GOUT WITHOUT TOPHUS, UNSPECIFIED CAUSE, UNSPECIFIED SITE: ICD-10-CM

## 2020-04-17 DIAGNOSIS — Z00.00 MEDICARE ANNUAL WELLNESS VISIT, SUBSEQUENT: ICD-10-CM

## 2020-04-17 DIAGNOSIS — N18.30 CKD (CHRONIC KIDNEY DISEASE), STAGE III (HCC): ICD-10-CM

## 2020-04-17 DIAGNOSIS — I67.2 CEREBROVASCULAR DISEASE, ARTERIOSCLEROTIC, POST-STROKE: ICD-10-CM

## 2020-04-17 DIAGNOSIS — I10 ESSENTIAL HYPERTENSION: Primary | ICD-10-CM

## 2020-04-17 DIAGNOSIS — M81.0 AGE-RELATED OSTEOPOROSIS WITHOUT CURRENT PATHOLOGICAL FRACTURE: ICD-10-CM

## 2020-04-17 DIAGNOSIS — M35.3 POLYMYALGIA RHEUMATICA (HCC): ICD-10-CM

## 2020-04-17 DIAGNOSIS — I63.9 ACUTE CVA (CEREBROVASCULAR ACCIDENT) (HCC): ICD-10-CM

## 2020-04-17 DIAGNOSIS — E78.2 MIXED HYPERLIPIDEMIA: ICD-10-CM

## 2020-04-17 DIAGNOSIS — I73.9 PERIPHERAL VASCULAR DISEASE (HCC): ICD-10-CM

## 2020-04-17 PROCEDURE — 1170F FXNL STATUS ASSESSED: CPT | Performed by: INTERNAL MEDICINE

## 2020-04-17 PROCEDURE — 3079F DIAST BP 80-89 MM HG: CPT | Performed by: INTERNAL MEDICINE

## 2020-04-17 PROCEDURE — 1036F TOBACCO NON-USER: CPT | Performed by: INTERNAL MEDICINE

## 2020-04-17 PROCEDURE — 1125F AMNT PAIN NOTED PAIN PRSNT: CPT | Performed by: INTERNAL MEDICINE

## 2020-04-17 PROCEDURE — 3077F SYST BP >= 140 MM HG: CPT | Performed by: INTERNAL MEDICINE

## 2020-04-17 PROCEDURE — G0439 PPPS, SUBSEQ VISIT: HCPCS | Performed by: INTERNAL MEDICINE

## 2020-04-17 PROCEDURE — 99214 OFFICE O/P EST MOD 30 MIN: CPT | Performed by: INTERNAL MEDICINE

## 2020-04-17 PROCEDURE — 4040F PNEUMOC VAC/ADMIN/RCVD: CPT | Performed by: INTERNAL MEDICINE

## 2020-04-17 PROCEDURE — 1160F RVW MEDS BY RX/DR IN RCRD: CPT | Performed by: INTERNAL MEDICINE

## 2020-04-17 RX ORDER — ATENOLOL 100 MG/1
100 TABLET ORAL DAILY
Qty: 90 TABLET | Refills: 1 | Status: SHIPPED | OUTPATIENT
Start: 2020-04-17 | End: 2020-07-14 | Stop reason: SDUPTHER

## 2020-05-07 ENCOUNTER — APPOINTMENT (OUTPATIENT)
Dept: LAB | Facility: CLINIC | Age: 80
End: 2020-05-07
Payer: COMMERCIAL

## 2020-05-07 DIAGNOSIS — E78.2 MIXED HYPERLIPIDEMIA: ICD-10-CM

## 2020-05-07 DIAGNOSIS — I10 ESSENTIAL HYPERTENSION: ICD-10-CM

## 2020-05-07 LAB
ALBUMIN SERPL BCP-MCNC: 3.3 G/DL (ref 3.5–5)
ALP SERPL-CCNC: 115 U/L (ref 46–116)
ALT SERPL W P-5'-P-CCNC: 29 U/L (ref 12–78)
ANION GAP SERPL CALCULATED.3IONS-SCNC: 3 MMOL/L (ref 4–13)
AST SERPL W P-5'-P-CCNC: 39 U/L (ref 5–45)
BILIRUB SERPL-MCNC: 0.46 MG/DL (ref 0.2–1)
BUN SERPL-MCNC: 26 MG/DL (ref 5–25)
CALCIUM SERPL-MCNC: 9.1 MG/DL (ref 8.3–10.1)
CHLORIDE SERPL-SCNC: 106 MMOL/L (ref 100–108)
CO2 SERPL-SCNC: 29 MMOL/L (ref 21–32)
CREAT SERPL-MCNC: 1.2 MG/DL (ref 0.6–1.3)
GFR SERPL CREATININE-BSD FRML MDRD: 43 ML/MIN/1.73SQ M
GLUCOSE P FAST SERPL-MCNC: 93 MG/DL (ref 65–99)
LDLC SERPL DIRECT ASSAY-MCNC: 112 MG/DL (ref 0–100)
POTASSIUM SERPL-SCNC: 3.8 MMOL/L (ref 3.5–5.3)
PROT SERPL-MCNC: 8.4 G/DL (ref 6.4–8.2)
SODIUM SERPL-SCNC: 138 MMOL/L (ref 136–145)
TRIGL SERPL-MCNC: 107 MG/DL

## 2020-05-07 PROCEDURE — 83721 ASSAY OF BLOOD LIPOPROTEIN: CPT

## 2020-05-07 PROCEDURE — 80053 COMPREHEN METABOLIC PANEL: CPT

## 2020-05-07 PROCEDURE — 84478 ASSAY OF TRIGLYCERIDES: CPT

## 2020-05-07 PROCEDURE — 36415 COLL VENOUS BLD VENIPUNCTURE: CPT

## 2020-06-14 DIAGNOSIS — M81.0 AGE-RELATED OSTEOPOROSIS WITHOUT CURRENT PATHOLOGICAL FRACTURE: ICD-10-CM

## 2020-06-15 DIAGNOSIS — G45.9 TIA (TRANSIENT ISCHEMIC ATTACK): ICD-10-CM

## 2020-06-15 RX ORDER — ALENDRONATE SODIUM 70 MG/1
TABLET ORAL
Qty: 12 TABLET | Refills: 3 | Status: SHIPPED | OUTPATIENT
Start: 2020-06-15 | End: 2021-02-19 | Stop reason: SDUPTHER

## 2020-06-15 RX ORDER — ATORVASTATIN CALCIUM 40 MG/1
40 TABLET, FILM COATED ORAL DAILY
Qty: 90 TABLET | Refills: 1 | Status: SHIPPED | OUTPATIENT
Start: 2020-06-15 | End: 2020-09-08 | Stop reason: SDUPTHER

## 2020-07-14 ENCOUNTER — OFFICE VISIT (OUTPATIENT)
Dept: INTERNAL MEDICINE CLINIC | Facility: CLINIC | Age: 80
End: 2020-07-14
Payer: COMMERCIAL

## 2020-07-14 VITALS
WEIGHT: 149 LBS | SYSTOLIC BLOOD PRESSURE: 160 MMHG | BODY MASS INDEX: 25.44 KG/M2 | DIASTOLIC BLOOD PRESSURE: 66 MMHG | TEMPERATURE: 98.7 F | HEART RATE: 63 BPM | OXYGEN SATURATION: 98 % | HEIGHT: 64 IN
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
## 2020-07-14 DIAGNOSIS — I10 ESSENTIAL HYPERTENSION: Primary | ICD-10-CM

## 2020-07-14 DIAGNOSIS — Z86.73 HISTORY OF CVA (CEREBROVASCULAR ACCIDENT): ICD-10-CM

## 2020-07-14 PROCEDURE — 3078F DIAST BP <80 MM HG: CPT | Performed by: INTERNAL MEDICINE

## 2020-07-14 PROCEDURE — 1036F TOBACCO NON-USER: CPT | Performed by: INTERNAL MEDICINE

## 2020-07-14 PROCEDURE — 3077F SYST BP >= 140 MM HG: CPT | Performed by: INTERNAL MEDICINE

## 2020-07-14 PROCEDURE — 3008F BODY MASS INDEX DOCD: CPT | Performed by: INTERNAL MEDICINE

## 2020-07-14 PROCEDURE — 99213 OFFICE O/P EST LOW 20 MIN: CPT | Performed by: INTERNAL MEDICINE

## 2020-07-14 PROCEDURE — 4040F PNEUMOC VAC/ADMIN/RCVD: CPT | Performed by: INTERNAL MEDICINE

## 2020-07-14 PROCEDURE — 1160F RVW MEDS BY RX/DR IN RCRD: CPT | Performed by: INTERNAL MEDICINE

## 2020-07-14 RX ORDER — ATENOLOL 100 MG/1
TABLET ORAL
Qty: 90 TABLET | Refills: 1
Start: 2020-07-14 | End: 2020-09-08 | Stop reason: SDUPTHER

## 2020-07-14 RX ORDER — AMLODIPINE BESYLATE 5 MG/1
5 TABLET ORAL DAILY
Qty: 90 TABLET | Refills: 3 | Status: SHIPPED | OUTPATIENT
Start: 2020-07-14 | End: 2021-04-08 | Stop reason: SDUPTHER

## 2020-07-14 NOTE — PATIENT INSTRUCTIONS

## 2020-07-14 NOTE — PROGRESS NOTES
Assessment/Plan:  Problem List Items Addressed This Visit        Cardiovascular and Mediastinum    Hypertension - Primary    Relevant Medications    atenolol (TENORMIN) 100 mg tablet    amLODIPine (NORVASC) 5 mg tablet      Other Visit Diagnoses     Dog scratch        History of CVA (cerebrovascular accident)        Relevant Medications    atenolol (TENORMIN) 100 mg tablet           Diagnoses and all orders for this visit:    Essential hypertension  -     atenolol (TENORMIN) 100 mg tablet; Take half a pill q day  -     amLODIPine (NORVASC) 5 mg tablet; Take 1 tablet (5 mg total) by mouth daily    Dog scratch    History of CVA (cerebrovascular accident)  -     atenolol (TENORMIN) 100 mg tablet; Take half a pill q day  No problem-specific Assessment & Plan notes found for this encounter  A/P: Doing well, but bp still elevated  Didn't tolerate higher dose of the beta blocker  Will add norvasc  Discussed local wound care and recommended [facility]  Td is up to date  Continue current treatment otherwise  RTC three months for routine  Subjective:      Patient ID: Senthil Nguyen is a [de-identified] y o  female  WF RTC with her relative for f/u elevated BP and having her atenolol increased  Pt reports not tolerating the higher dose and went back on 50mg q day  Doing ok otherwise and only c/o is her dog scratched her on the right arm  The following portions of the patient's history were reviewed and updated as appropriate:   She has a past medical history of Acute CVA (cerebrovascular accident) ([facility] [address] Chronic kidney disease, Dementia ([facility] [address] ), Gout, Hypertension, and Osteoporosis  ,  does not have any pertinent problems on file  ,   has a past surgical history that includes Appendectomy; Hysterectomy; Tubal ligation; and Uterine fibroid surgery  ,  family history includes Hypertension in her father; Other in her father; Stroke in her mother  ,   reports that she has quit smoking   She has never used smokeless tobacco  She reports that she does not drink alcohol or use drugs  ,  is allergic to oxycodone-acetaminophen     Current Outpatient Medications   Medication Sig Dispense Refill    alendronate (FOSAMAX) 70 mg tablet Take 1 tablet by mouth once a week 12 tablet 3    allopurinol (ZYLOPRIM) 100 mg tablet Take 1 tablet by mouth once daily 90 tablet 1    atenolol (TENORMIN) 100 mg tablet Take half a pill q day  90 tablet 1    atorvastatin (LIPITOR) 40 mg tablet Take 1 tablet (40 mg total) by mouth daily 90 tablet 1    clopidogrel (PLAVIX) 75 mg tablet Take 1 tablet by mouth once daily 90 tablet 1    donepezil (ARICEPT) 5 mg tablet Take 1 tablet (5 mg total) by mouth daily at bedtime 90 tablet 3    ergocalciferol (VITAMIN D2) 50,000 units Take 1 capsule (50,000 Units total) by mouth once a week 12 capsule 3    fluticasone (FLONASE) 50 mcg/act nasal spray 1 spray into each nostril daily 16 g 0    hydrochlorothiazide (HYDRODIURIL) 50 mg tablet Take 1 tablet by mouth once daily 90 tablet 1    lisinopril (ZESTRIL) 40 mg tablet Take 1 tablet by mouth once daily 90 tablet 1    loratadine (CLARITIN) 10 mg tablet Take 1 tablet (10 mg total) by mouth daily 90 tablet 0    Multiple Vitamins-Minerals (MULTI ADULT GUMMIES) CHEW Chew 1 tablet daily      OMEGA-3 FATTY ACIDS-VITAMIN E PO Take 1,000 mg by mouth      pentoxifylline (TRENtal) 400 mg ER tablet TAKE 1 TABLET BY MOUTH THREE TIMES DAILY WITH MEALS 90 tablet 1    amLODIPine (NORVASC) 5 mg tablet Take 1 tablet (5 mg total) by mouth daily 90 tablet 3     No current facility-administered medications for this visit  Review of Systems   Constitutional: Negative for activity change, chills, diaphoresis, fatigue and fever  Respiratory: Negative for cough, chest tightness, shortness of breath and wheezing  Cardiovascular: Negative for chest pain, palpitations and leg swelling  Gastrointestinal: Negative for abdominal pain, constipation, diarrhea, nausea and vomiting  Genitourinary: Negative for difficulty urinating, dysuria and frequency  Musculoskeletal: Negative for arthralgias, gait problem and myalgias  Skin: Positive for wound  Neurological: Negative for light-headedness and headaches  Psychiatric/Behavioral: Negative for confusion  The patient is not nervous/anxious  PHQ-9 Depression Screening    PHQ-9:    Frequency of the following problems over the past two weeks:             Objective:  Vitals:    07/14/20 1413   BP: 160/66   Pulse: 63   Temp: 98 7 °F (37 1 °C)   SpO2: 98%   Weight: 67 6 kg (149 lb)   Height: 5' 4" (1 626 m)     Body mass index is 25 58 kg/m²  Physical Exam   Constitutional: She is oriented to person, place, and time  She appears well-developed and well-nourished  No distress  HENT:   Head: Normocephalic and atraumatic  Mouth/Throat: Oropharynx is clear and moist    Eyes: Pupils are equal, round, and reactive to light  Conjunctivae and EOM are normal    Cardiovascular: Normal rate, regular rhythm and normal heart sounds  Pulmonary/Chest: Effort normal and breath sounds normal  No respiratory distress  She has no wheezes  She has no rales  Abdominal: Soft  Bowel sounds are normal  She exhibits no distension  There is no tenderness  Musculoskeletal: She exhibits no edema  Neurological: She is alert and oriented to person, place, and time  Skin:   Right proximal forearm with small avulsion would of the skin  Mild bloody d/c, no purulent d/c  No surrounding erythema  Psychiatric: She has a normal mood and affect  Her behavior is normal  Judgment and thought content normal    Nursing note and vitals reviewed

## 2020-08-12 ENCOUNTER — OFFICE VISIT (OUTPATIENT)
Dept: URGENT CARE | Facility: CLINIC | Age: 80
End: 2020-08-12
Payer: COMMERCIAL

## 2020-08-12 VITALS
OXYGEN SATURATION: 96 % | SYSTOLIC BLOOD PRESSURE: 162 MMHG | HEART RATE: 65 BPM | DIASTOLIC BLOOD PRESSURE: 71 MMHG | RESPIRATION RATE: 16 BRPM | TEMPERATURE: 97.8 F

## 2020-08-12 DIAGNOSIS — J01.00 ACUTE MAXILLARY SINUSITIS, RECURRENCE NOT SPECIFIED: Primary | ICD-10-CM

## 2020-08-12 PROCEDURE — S9088 SERVICES PROVIDED IN URGENT: HCPCS | Performed by: PHYSICIAN ASSISTANT

## 2020-08-12 PROCEDURE — 99213 OFFICE O/P EST LOW 20 MIN: CPT | Performed by: PHYSICIAN ASSISTANT

## 2020-08-12 RX ORDER — AZITHROMYCIN 250 MG/1
TABLET, FILM COATED ORAL
Qty: 6 TABLET | Refills: 0 | Status: SHIPPED | OUTPATIENT
Start: 2020-08-12 | End: 2020-08-16

## 2020-08-12 NOTE — PROGRESS NOTES
800            NAME: Walt Knox is a [de-identified] y o  female  : 1940    MRN: [id number]  DATE: 2020  TIME: 11:55 AM    Assessment and Plan   Acute maxillary sinusitis, recurrence not specified [J01 00]  1  Acute maxillary sinusitis, recurrence not specified  azithromycin (ZITHROMAX) 250 mg tablet       Patient Instructions   I have prescribed an antibiotic for the infection  Please take the antibiotic as prescribed and finish the entire prescription  I recommend that the patient takes an over the counter probiotic or eats yogurt with live cultures in it [place]) to keep good bacteria in the gut and help prevent diarrhea  Wash hands frequently to prevent the spread of infection  Can use over the counter cough and cold medications to help with symptoms  Ibuprofen and/or tylenol as needed for pain or fever  If not improving over the next 3-5 days, follow up with PCP  To present to the ER if symptoms worsen  Chief Complaint     Chief Complaint   Patient presents with    Cough     pt c/o a cough and post nasal drip for a week  History of Present Illness   Yuki Raulito Velasquez presents to the clinic c/o    URI    This is a new problem  The current episode started 1 to 4 weeks ago  The problem has been gradually worsening  There has been no fever  Associated symptoms include congestion, coughing, headaches, sinus pain and sneezing  Pertinent negatives include no chest pain, ear pain, joint pain, nausea, rash, rhinorrhea, sore throat, swollen glands or wheezing  She has tried nothing for the symptoms  The treatment provided no relief  Review of Systems   Review of Systems   Constitutional: Negative for chills, fatigue and fever  HENT: Positive for congestion, postnasal drip, sinus pressure, sinus pain and sneezing  Negative for ear discharge, ear pain, rhinorrhea and sore throat  Respiratory: Positive for cough   Negative for apnea, chest tightness, shortness of breath and wheezing  Cardiovascular: Negative for chest pain, palpitations and leg swelling  Gastrointestinal: Negative for abdominal distention and nausea  Musculoskeletal: Negative for joint pain  Skin: Negative for rash  Neurological: Positive for headaches  Negative for dizziness, light-headedness and numbness  Hematological: Negative for adenopathy  Current Medications     Long-Term Medications   Medication Sig Dispense Refill    alendronate (FOSAMAX) 70 mg tablet Take 1 tablet by mouth once a week 12 tablet 3    allopurinol (ZYLOPRIM) 100 mg tablet Take 1 tablet by mouth once daily 90 tablet 1    amLODIPine (NORVASC) 5 mg tablet Take 1 tablet (5 mg total) by mouth daily 90 tablet 3    atenolol (TENORMIN) 100 mg tablet Take half a pill q day   90 tablet 1    atorvastatin (LIPITOR) 40 mg tablet Take 1 tablet (40 mg total) by mouth daily 90 tablet 1    clopidogrel (PLAVIX) 75 mg tablet Take 1 tablet by mouth once daily 90 tablet 1    donepezil (ARICEPT) 5 mg tablet Take 1 tablet (5 mg total) by mouth daily at bedtime 90 tablet 3    ergocalciferol (VITAMIN D2) 50,000 units Take 1 capsule (50,000 Units total) by mouth once a week 12 capsule 3    fluticasone (FLONASE) 50 mcg/act nasal spray 1 spray into each nostril daily 16 g 0    hydrochlorothiazide (HYDRODIURIL) 50 mg tablet Take 1 tablet by mouth once daily 90 tablet 1    lisinopril (ZESTRIL) 40 mg tablet Take 1 tablet by mouth once daily 90 tablet 1    loratadine (CLARITIN) 10 mg tablet Take 1 tablet (10 mg total) by mouth daily 90 tablet 0    Multiple Vitamins-Minerals (MULTI ADULT GUMMIES) CHEW Chew 1 tablet daily      pentoxifylline (TRENtal) 400 mg ER tablet TAKE 1 TABLET BY MOUTH THREE TIMES DAILY WITH MEALS 90 tablet 1       Current Allergies     Allergies as of 08/12/2020 - Reviewed 08/12/2020   Allergen Reaction Noted    Oxycodone-acetaminophen Rash 03/21/2008            The following portions of the patient's history were reviewed and updated as appropriate: allergies, current medications, past family history, past medical history, past social history, past surgical history and problem list   Past Medical History:   Diagnosis Date    [facility] ([facility] )     Chronic kidney disease     Dementia ([facility] )     Gout     Hypertension     Osteoporosis      Past Surgical History:   Procedure Laterality Date    APPENDECTOMY      HYSTERECTOMY      TUBAL LIGATION      UTERINE FIBROID SURGERY       Social History     Socioeconomic History    Marital status:      Spouse name: Not on file    Number of children: 3    Years of education: Not on file    Highest education level: Not on file   Occupational History    Occupation: Store Owner   Social Needs    Financial resource strain: Not on file    Food insecurity     Worry: Not on file     Inability: Not on file   [employer] needs     Medical: Not on file     Non-medical: Not on file   Tobacco Use    Smoking status: Former Smoker    Smokeless tobacco: Never Used   Substance and Sexual Activity    Alcohol use: No     Frequency: Never     Drinks per session: Patient refused     Binge frequency: Never    Drug use: No    Sexual activity: Not Currently   Lifestyle    Physical activity     Days per week: Not on file     Minutes per session: Not on file    Stress: Not on file   Relationships    Social connections     Talks on phone: Not on file     Gets together: Not on file     Attends Sabianism service: Not on file     Active member of club or organization: Not on file     Attends meetings of clubs or organizations: Not on file     Relationship status: Not on file    Intimate partner violence     Fear of current or ex partner: Not on file     Emotionally abused: Not on file     Physically abused: Not on file     Forced sexual activity: Not on file   Other Topics Concern    Not on file   Social History Narrative    Lives with adult children             Objective   /71   Pulse 65   Temp 97 8 °F (36 6 °C)   Resp 16   SpO2 96%      Physical Exam     Physical Exam  Vitals signs and nursing note reviewed  Constitutional:       General: She is not in acute distress  Appearance: She is not ill-appearing, toxic-appearing or diaphoretic  HENT:      Head: Normocephalic  Right Ear: Tympanic membrane and external ear normal       Left Ear: Tympanic membrane and external ear normal       Nose: Congestion present  No rhinorrhea  Right Sinus: Maxillary sinus tenderness present  Left Sinus: Maxillary sinus tenderness present  Mouth/Throat:      Mouth: Mucous membranes are moist       Pharynx: Oropharynx is clear  No oropharyngeal exudate or posterior oropharyngeal erythema  Eyes:      General:         Right eye: No discharge  Left eye: No discharge  Conjunctiva/sclera: Conjunctivae normal    Cardiovascular:      Rate and Rhythm: Normal rate and regular rhythm  Heart sounds: Normal heart sounds  No murmur  No friction rub  No gallop  Pulmonary:      Effort: Pulmonary effort is normal  No respiratory distress  Breath sounds: Normal breath sounds  No stridor  No decreased breath sounds, wheezing, rhonchi or rales  Comments: Crackles b/l lung bases  Skin:     General: Skin is warm  Neurological:      Mental Status: She is alert  Psychiatric:         Mood and Affect: Mood normal          Behavior: Behavior normal          Thought Content:  Thought content normal          Jenny Cruz PA-C

## 2020-09-08 ENCOUNTER — TELEPHONE (OUTPATIENT)
Dept: INTERNAL MEDICINE CLINIC | Facility: CLINIC | Age: 80
End: 2020-09-08

## 2020-09-08 DIAGNOSIS — G45.9 TIA (TRANSIENT ISCHEMIC ATTACK): ICD-10-CM

## 2020-09-08 DIAGNOSIS — I63.9 CEREBROVASCULAR ACCIDENT (CVA), UNSPECIFIED MECHANISM (HCC): ICD-10-CM

## 2020-09-08 DIAGNOSIS — Z86.73 HISTORY OF CVA (CEREBROVASCULAR ACCIDENT): ICD-10-CM

## 2020-09-08 DIAGNOSIS — I10 ESSENTIAL HYPERTENSION: ICD-10-CM

## 2020-09-08 DIAGNOSIS — M1A.9XX0 CHRONIC GOUT WITHOUT TOPHUS, UNSPECIFIED CAUSE, UNSPECIFIED SITE: ICD-10-CM

## 2020-09-08 RX ORDER — ATENOLOL 100 MG/1
TABLET ORAL
Qty: 90 TABLET | Refills: 1
Start: 2020-09-08 | End: 2020-09-11

## 2020-09-08 RX ORDER — LISINOPRIL 40 MG/1
40 TABLET ORAL DAILY
Qty: 90 TABLET | Refills: 1 | Status: SHIPPED | OUTPATIENT
Start: 2020-09-08 | End: 2021-02-26 | Stop reason: SDUPTHER

## 2020-09-08 RX ORDER — PENTOXIFYLLINE 400 MG/1
400 TABLET, EXTENDED RELEASE ORAL
Qty: 90 TABLET | Refills: 1 | Status: SHIPPED | OUTPATIENT
Start: 2020-09-08 | End: 2020-11-16

## 2020-09-08 RX ORDER — ALLOPURINOL 100 MG/1
100 TABLET ORAL DAILY
Qty: 90 TABLET | Refills: 1 | Status: SHIPPED | OUTPATIENT
Start: 2020-09-08 | End: 2021-02-26 | Stop reason: SDUPTHER

## 2020-09-08 RX ORDER — CLOPIDOGREL BISULFATE 75 MG/1
75 TABLET ORAL DAILY
Qty: 90 TABLET | Refills: 1 | Status: SHIPPED | OUTPATIENT
Start: 2020-09-08 | End: 2021-01-29 | Stop reason: SDUPTHER

## 2020-09-08 RX ORDER — HYDROCHLOROTHIAZIDE 50 MG/1
50 TABLET ORAL DAILY
Qty: 90 TABLET | Refills: 1 | Status: SHIPPED | OUTPATIENT
Start: 2020-09-08 | End: 2021-02-26 | Stop reason: SDUPTHER

## 2020-09-08 RX ORDER — ATORVASTATIN CALCIUM 40 MG/1
40 TABLET, FILM COATED ORAL DAILY
Qty: 90 TABLET | Refills: 1 | Status: SHIPPED | OUTPATIENT
Start: 2020-09-08 | End: 2021-01-29 | Stop reason: SDUPTHER

## 2020-09-11 ENCOUNTER — TELEPHONE (OUTPATIENT)
Dept: INTERNAL MEDICINE CLINIC | Facility: CLINIC | Age: 80
End: 2020-09-11

## 2020-09-11 DIAGNOSIS — I10 ESSENTIAL HYPERTENSION: Primary | ICD-10-CM

## 2020-09-11 RX ORDER — ATENOLOL 50 MG/1
50 TABLET ORAL DAILY
Qty: 30 TABLET | Refills: 5 | Status: SHIPPED | OUTPATIENT
Start: 2020-09-11 | End: 2021-05-14 | Stop reason: SDUPTHER

## 2020-09-11 NOTE — TELEPHONE ENCOUNTER
Pt said the atenolol was not sent to [facility] for her      She wants atenolol 50 mg -1 daily #90-[facility] [place]    (She does not want the 100 mg because she cannot cut them in half)

## 2020-09-21 ENCOUNTER — OFFICE VISIT (OUTPATIENT)
Dept: URGENT CARE | Facility: CLINIC | Age: 80
End: 2020-09-21
Payer: COMMERCIAL

## 2020-09-21 VITALS
SYSTOLIC BLOOD PRESSURE: 178 MMHG | HEIGHT: 64 IN | DIASTOLIC BLOOD PRESSURE: 76 MMHG | TEMPERATURE: 97.8 F | BODY MASS INDEX: 25.44 KG/M2 | HEART RATE: 74 BPM | WEIGHT: 149 LBS | RESPIRATION RATE: 18 BRPM | OXYGEN SATURATION: 95 %

## 2020-09-21 DIAGNOSIS — S46.811A STRAIN OF RIGHT TRAPEZIUS MUSCLE, INITIAL ENCOUNTER: Primary | ICD-10-CM

## 2020-09-21 PROCEDURE — 99213 OFFICE O/P EST LOW 20 MIN: CPT | Performed by: NURSE PRACTITIONER

## 2020-09-21 PROCEDURE — S9088 SERVICES PROVIDED IN URGENT: HCPCS | Performed by: NURSE PRACTITIONER

## 2020-09-21 NOTE — PATIENT INSTRUCTIONS
The neck tightness is from muscle strain/spasm  Tylenol, ice/heat or just moist heat, gentle massage can all help  See below for more information  Muscle Spasm   WHAT YOU NEED TO KNOW:   A muscle spasm is a sudden contraction of any muscle or group of muscles  A muscle cramp is a painful muscle spasm  Muscle cramps commonly occur after intense exercise or during pregnancy  They may also be caused by certain medications, dehydration, low calcium or magnesium levels, or another medical condition  DISCHARGE INSTRUCTIONS:   Medicines: You may need the following:  · NSAIDs  help decrease swelling and pain or fever  This medicine is available with or without a doctor's order  NSAIDs can cause stomach bleeding or kidney problems in certain people  If you take blood thinner medicine, always ask your healthcare provider if NSAIDs are safe for you  Always read the medicine label and follow directions  · Take your medicine as directed  Contact your healthcare provider if you think your medicine is not helping or if you have side effects  Tell him of her if you are allergic to any medicine  Keep a list of the medicines, vitamins, and herbs you take  Include the amounts, and when and why you take them  Bring the list or the pill bottles to follow-up visits  Carry your medicine list with you in case of an emergency  Follow up with your healthcare provider as directed: You may need other tests or treatment  You may also be referred to a physical therapist or other specialist  Write down your questions so you remember to ask them during your visits  Self-care:   · Stretch  your muscle to help relieve the cramp  It may be helpful to keep your muscle in the stretched position until the cramp is gone  · Apply heat  to help decrease pain and muscle spasms  Apply heat on the area for 20 to 30 minutes every 2 hours for as many days as directed  · Apply ice  to help decrease swelling and pain   Ice may also help prevent tissue damage  Use an ice pack, or put crushed ice in a plastic bag  Cover it with a towel and place it on your muscle for 15 to 20 minutes every hour or as directed  · Drink more liquids  to help prevent muscle cramps caused by dehydration  Sports drinks may help replace electrolytes you lose through sweat during exercise  Ask your healthcare provider how much liquid to drink each day and which liquids are best for you  · Eat healthy foods , such as fruits, vegetables, whole grains, low-fat dairy products, and lean proteins (meat, beans, and fish)  If you are pregnant, ask your healthcare provider about foods that are high in magnesium and sodium  They may help to relieve cramps during pregnancy  · Massage your muscle  to help relieve the cramp  · Take frequent deep breaths  until the cramp feels better  Lie down while you take the deep breaths so you do not get dizzy or lightheaded  Contact your healthcare provider if:   · You have signs of dehydration, such as a headache, dark yellow urine, dry eyes or mouth, or a fast heartbeat  · You have questions or concerns about your condition or care  Return to the emergency department if:   · You have warmth, swelling, or redness in the cramping muscle  · You have frequent or unrelieved muscle cramps in several different muscles  · You have muscle cramps with numbness, tingling, and burning in your hands and feet  © 2017 [employer]  Information is for End User's use only and may not be sold, redistributed or otherwise used for commercial purposes  All illustrations and images included in CareNotes® are the copyrighted property of A D A M , Inc  or [employer]  The above information is an  only  It is not intended as medical advice for individual conditions or treatments   Talk to your doctor, nurse or pharmacist before following any medical regimen to see if it is safe and effective for you

## 2020-09-21 NOTE — PROGRESS NOTES
[facility]        NAME: Bandar Thorne is a [de-identified] y o  female  : 1940    MRN: [id number]  DATE: 2020  TIME: 1:26 PM    Assessment and Plan   Strain of right trapezius muscle, initial encounter [C98 174K]  1  Strain of right trapezius muscle, initial encounter           Patient Instructions     Patient Instructions   The neck tightness is from muscle strain/spasm  Tylenol, ice/heat or just moist heat, gentle massage can all help  See below for more information  Muscle Spasm   WHAT YOU NEED TO KNOW:   A muscle spasm is a sudden contraction of any muscle or group of muscles  A muscle cramp is a painful muscle spasm  Muscle cramps commonly occur after intense exercise or during pregnancy  They may also be caused by certain medications, dehydration, low calcium or magnesium levels, or another medical condition  DISCHARGE INSTRUCTIONS:   Medicines: You may need the following:  · NSAIDs  help decrease swelling and pain or fever  This medicine is available with or without a doctor's order  NSAIDs can cause stomach bleeding or kidney problems in certain people  If you take blood thinner medicine, always ask your healthcare provider if NSAIDs are safe for you  Always read the medicine label and follow directions  · Take your medicine as directed  Contact your healthcare provider if you think your medicine is not helping or if you have side effects  Tell him of her if you are allergic to any medicine  Keep a list of the medicines, vitamins, and herbs you take  Include the amounts, and when and why you take them  Bring the list or the pill bottles to follow-up visits  Carry your medicine list with you in case of an emergency  Follow up with your healthcare provider as directed: You may need other tests or treatment  You may also be referred to a physical therapist or other specialist  Write down your questions so you remember to ask them during your visits     Self-care:   · Stretch  your muscle to help relieve the cramp  It may be helpful to keep your muscle in the stretched position until the cramp is gone  · Apply heat  to help decrease pain and muscle spasms  Apply heat on the area for 20 to 30 minutes every 2 hours for as many days as directed  · Apply ice  to help decrease swelling and pain  Ice may also help prevent tissue damage  Use an ice pack, or put crushed ice in a plastic bag  Cover it with a towel and place it on your muscle for 15 to 20 minutes every hour or as directed  · Drink more liquids  to help prevent muscle cramps caused by dehydration  Sports drinks may help replace electrolytes you lose through sweat during exercise  Ask your healthcare provider how much liquid to drink each day and which liquids are best for you  · Eat healthy foods , such as fruits, vegetables, whole grains, low-fat dairy products, and lean proteins (meat, beans, and fish)  If you are pregnant, ask your healthcare provider about foods that are high in magnesium and sodium  They may help to relieve cramps during pregnancy  · Massage your muscle  to help relieve the cramp  · Take frequent deep breaths  until the cramp feels better  Lie down while you take the deep breaths so you do not get dizzy or lightheaded  Contact your healthcare provider if:   · You have signs of dehydration, such as a headache, dark yellow urine, dry eyes or mouth, or a fast heartbeat  · You have questions or concerns about your condition or care  Return to the emergency department if:   · You have warmth, swelling, or redness in the cramping muscle  · You have frequent or unrelieved muscle cramps in several different muscles  · You have muscle cramps with numbness, tingling, and burning in your hands and feet  © 2017 [address] [place] Information is for End User's use only and may not be sold, redistributed or otherwise used for commercial purposes   All illustrations and images included in [facility] are the copyrighted property of A D A M , Inc  or [employer]  The above information is an  only  It is not intended as medical advice for individual conditions or treatments  Talk to your doctor, nurse or pharmacist before following any medical regimen to see if it is safe and effective for you  Follow up with PCP in 3-5 days  Proceed to  ER if symptoms worsen  Chief Complaint     Chief Complaint   Patient presents with    Neck Pain     Patient c/o stiff neck,  unable to turn neck to the right side, denies injury  History of Present Illness       Patient presents to be seen accompanied by daughter  She reports onset of a sore stiff neck on the right side couple days ago after she fell sleep with her head hanging down/forward  She has not attempted any treatment, noting that she does not like to take medications, even Tylenol  She states that it seems slightly better today, but still hurts when she turns her neck to the right side  No injury  She states that she is experiencing mild discomfort in her right ear and is not sure that is related to the neck  Both patient and daughter state that they are planning to visit a 10year-old family member who has cancer and is receiving chemotherapy, so patient went to make sure she was not sick before the visit  She has no other symptoms of illness  Patient does note that she had a flu vaccine 1 5 weeks ago, but she had no symptoms from that until the stiff neck that occurred this weekend  Review of Systems   Review of Systems   HENT: Positive for ear pain  Negative for ear discharge  Musculoskeletal: Positive for myalgias  All other systems reviewed and are negative          Current Medications       Current Outpatient Medications:     alendronate (FOSAMAX) 70 mg tablet, Take 1 tablet by mouth once a week, Disp: 12 tablet, Rfl: 3    allopurinol (ZYLOPRIM) 100 mg tablet, Take 1 tablet (100 mg total) by mouth daily, Disp: 90 tablet, Rfl: 1    amLODIPine (NORVASC) 5 mg tablet, Take 1 tablet (5 mg total) by mouth daily, Disp: 90 tablet, Rfl: 3    atenolol (TENORMIN) 50 mg tablet, Take 1 tablet (50 mg total) by mouth daily, Disp: 30 tablet, Rfl: 5    atorvastatin (LIPITOR) 40 mg tablet, Take 1 tablet (40 mg total) by mouth daily, Disp: 90 tablet, Rfl: 1    clopidogrel (PLAVIX) 75 mg tablet, Take 1 tablet (75 mg total) by mouth daily, Disp: 90 tablet, Rfl: 1    donepezil (ARICEPT) 5 mg tablet, Take 1 tablet (5 mg total) by mouth daily at bedtime, Disp: 90 tablet, Rfl: 3    ergocalciferol (VITAMIN D2) 50,000 units, Take 1 capsule (50,000 Units total) by mouth once a week, Disp: 12 capsule, Rfl: 3    fluticasone (FLONASE) 50 mcg/act nasal spray, 1 spray into each nostril daily, Disp: 16 g, Rfl: 0    hydrochlorothiazide (HYDRODIURIL) 50 mg tablet, Take 1 tablet (50 mg total) by mouth daily, Disp: 90 tablet, Rfl: 1    lisinopril (ZESTRIL) 40 mg tablet, Take 1 tablet (40 mg total) by mouth daily, Disp: 90 tablet, Rfl: 1    loratadine (CLARITIN) 10 mg tablet, Take 1 tablet (10 mg total) by mouth daily, Disp: 90 tablet, Rfl: 0    Multiple Vitamins-Minerals (MULTI ADULT GUMMIES) CHEW, Chew 1 tablet daily, Disp: , Rfl:     OMEGA-3 FATTY ACIDS-VITAMIN E PO, Take 1,000 mg by mouth, Disp: , Rfl:     pentoxifylline (TRENtal) 400 mg ER tablet, TAKE 1 TABLET BY MOUTH THREE TIMES DAILY WITH MEALS, Disp: 90 tablet, Rfl: 1    pentoxifylline (TRENtal) 400 mg ER tablet, Take 1 tablet (400 mg total) by mouth 3 (three) times a day with meals, Disp: 90 tablet, Rfl: 1    Current Allergies     Allergies as of 09/21/2020 - Reviewed 09/21/2020   Allergen Reaction Noted    Oxycodone-acetaminophen Rash 03/21/2008            The following portions of the patient's history were reviewed and updated as appropriate: allergies, current medications, past family history, past medical history, past social history, past surgical history and problem list      Past Medical History:   Diagnosis Date    [facility]     Chronic kidney disease     [facility]     Gout     Hypertension     Osteoporosis        Past Surgical History:   Procedure Laterality Date    APPENDECTOMY      HYSTERECTOMY      TUBAL LIGATION      UTERINE FIBROID SURGERY         Family History   Problem Relation Age of Onset    Stroke Mother     Other Father         Coal workers' pneumoconiosis    Hypertension Father          Medications have been verified  Objective   BP (!) 178/76   Pulse 74   Temp 97 8 °F (36 6 °C) (Temporal)   Resp 18   Ht 5' 4" (1 626 m)   Wt 67 6 kg (149 lb)   SpO2 95%   BMI 25 58 kg/m²        Physical Exam     Physical Exam  Vitals signs and nursing note reviewed  Constitutional:       General: She is not in acute distress  Appearance: Normal appearance  She is well-developed  She is not ill-appearing, toxic-appearing or diaphoretic  HENT:      Head: Normocephalic and atraumatic  Right Ear: Hearing, tympanic membrane, ear canal and external ear normal       Left Ear: Hearing, tympanic membrane, ear canal and external ear normal       Nose: Nose normal       Mouth/Throat:      Mouth: Mucous membranes are moist       Pharynx: Oropharynx is clear  Uvula midline  No pharyngeal swelling, oropharyngeal exudate, posterior oropharyngeal erythema or uvula swelling  Eyes:      Pupils: Pupils are equal, round, and reactive to light  Neck:      Musculoskeletal: Normal range of motion and neck supple  Muscular tenderness (mild R trapezius muscle spasm/tightness) present  No spinous process tenderness  Thyroid: No thyroid tenderness  Pulmonary:      Effort: Pulmonary effort is normal  No respiratory distress  Abdominal:      General: There is no distension  Palpations: Abdomen is soft     Musculoskeletal:      Cervical back: She exhibits decreased range of motion (slightly due to discomfort), tenderness, pain and spasm  She exhibits no bony tenderness, no swelling, no edema, no deformity, no laceration and normal pulse  Back:    Lymphadenopathy:      Cervical: No cervical adenopathy  Skin:     General: Skin is warm and dry  Capillary Refill: Capillary refill takes less than 2 seconds  Neurological:      Mental Status: She is alert and oriented to person, place, and time  Psychiatric:         Behavior: Behavior normal          Thought Content:  Thought content normal          Judgment: Judgment normal

## 2020-11-16 ENCOUNTER — OFFICE VISIT (OUTPATIENT)
Dept: INTERNAL MEDICINE CLINIC | Facility: CLINIC | Age: 80
End: 2020-11-16
Payer: COMMERCIAL

## 2020-11-16 ENCOUNTER — APPOINTMENT (OUTPATIENT)
Dept: LAB | Facility: CLINIC | Age: 80
End: 2020-11-16
Payer: COMMERCIAL

## 2020-11-16 VITALS
RESPIRATION RATE: 18 BRPM | DIASTOLIC BLOOD PRESSURE: 60 MMHG | WEIGHT: 149 LBS | HEIGHT: 64 IN | OXYGEN SATURATION: 90 % | SYSTOLIC BLOOD PRESSURE: 138 MMHG | HEART RATE: 62 BPM | TEMPERATURE: 97.7 F | BODY MASS INDEX: 25.44 KG/M2

## 2020-11-16 DIAGNOSIS — I73.9 INTERMITTENT CLAUDICATION (HCC): ICD-10-CM

## 2020-11-16 DIAGNOSIS — Z13.220 SCREENING FOR LIPID DISORDERS: ICD-10-CM

## 2020-11-16 DIAGNOSIS — N18.30 STAGE 3 CHRONIC KIDNEY DISEASE, UNSPECIFIED WHETHER STAGE 3A OR 3B CKD (HCC): ICD-10-CM

## 2020-11-16 DIAGNOSIS — E78.2 MIXED HYPERLIPIDEMIA: ICD-10-CM

## 2020-11-16 DIAGNOSIS — Z13.31 NEGATIVE DEPRESSION SCREENING: ICD-10-CM

## 2020-11-16 DIAGNOSIS — Z13.29 SCREENING FOR THYROID DISORDER: ICD-10-CM

## 2020-11-16 DIAGNOSIS — I10 ESSENTIAL HYPERTENSION: Primary | ICD-10-CM

## 2020-11-16 DIAGNOSIS — Z86.73 CEREBROVASCULAR DISEASE, ARTERIOSCLEROTIC, POST-STROKE: ICD-10-CM

## 2020-11-16 DIAGNOSIS — Z13.1 SCREENING FOR DIABETES MELLITUS (DM): ICD-10-CM

## 2020-11-16 DIAGNOSIS — M81.0 AGE-RELATED OSTEOPOROSIS WITHOUT CURRENT PATHOLOGICAL FRACTURE: ICD-10-CM

## 2020-11-16 DIAGNOSIS — M1A.9XX0 CHRONIC GOUT WITHOUT TOPHUS, UNSPECIFIED CAUSE, UNSPECIFIED SITE: ICD-10-CM

## 2020-11-16 DIAGNOSIS — M35.3 POLYMYALGIA RHEUMATICA (HCC): ICD-10-CM

## 2020-11-16 DIAGNOSIS — Z13.0 SCREENING FOR DEFICIENCY ANEMIA: ICD-10-CM

## 2020-11-16 DIAGNOSIS — I73.9 PERIPHERAL VASCULAR DISEASE (HCC): ICD-10-CM

## 2020-11-16 DIAGNOSIS — I67.2 CEREBROVASCULAR DISEASE, ARTERIOSCLEROTIC, POST-STROKE: ICD-10-CM

## 2020-11-16 DIAGNOSIS — Z23 ENCOUNTER FOR VACCINATION: ICD-10-CM

## 2020-11-16 DIAGNOSIS — I10 ESSENTIAL HYPERTENSION: ICD-10-CM

## 2020-11-16 LAB
25(OH)D3 SERPL-MCNC: 58.6 NG/ML (ref 30–100)
ALBUMIN SERPL BCP-MCNC: 3.5 G/DL (ref 3.5–5)
ALP SERPL-CCNC: 86 U/L (ref 46–116)
ALT SERPL W P-5'-P-CCNC: 20 U/L (ref 12–78)
ANION GAP SERPL CALCULATED.3IONS-SCNC: 5 MMOL/L (ref 4–13)
AST SERPL W P-5'-P-CCNC: 23 U/L (ref 5–45)
BASOPHILS # BLD AUTO: 0.08 THOUSANDS/ΜL (ref 0–0.1)
BASOPHILS NFR BLD AUTO: 1 % (ref 0–1)
BILIRUB SERPL-MCNC: 0.6 MG/DL (ref 0.2–1)
BUN SERPL-MCNC: 22 MG/DL (ref 5–25)
CALCIUM SERPL-MCNC: 9.5 MG/DL (ref 8.3–10.1)
CHLORIDE SERPL-SCNC: 104 MMOL/L (ref 100–108)
CHOLEST SERPL-MCNC: 202 MG/DL (ref 50–200)
CO2 SERPL-SCNC: 32 MMOL/L (ref 21–32)
CREAT SERPL-MCNC: 1.21 MG/DL (ref 0.6–1.3)
EOSINOPHIL # BLD AUTO: 0.94 THOUSAND/ΜL (ref 0–0.61)
EOSINOPHIL NFR BLD AUTO: 10 % (ref 0–6)
ERYTHROCYTE [DISTWIDTH] IN BLOOD BY AUTOMATED COUNT: 14.5 % (ref 11.6–15.1)
GFR SERPL CREATININE-BSD FRML MDRD: 42 ML/MIN/1.73SQ M
GLUCOSE P FAST SERPL-MCNC: 88 MG/DL (ref 65–99)
HCT VFR BLD AUTO: 41.4 % (ref 34.8–46.1)
HDLC SERPL-MCNC: 46 MG/DL
HGB BLD-MCNC: 12.7 G/DL (ref 11.5–15.4)
IMM GRANULOCYTES # BLD AUTO: 0.03 THOUSAND/UL (ref 0–0.2)
IMM GRANULOCYTES NFR BLD AUTO: 0 % (ref 0–2)
LDLC SERPL CALC-MCNC: 132 MG/DL (ref 0–100)
LYMPHOCYTES # BLD AUTO: 2.58 THOUSANDS/ΜL (ref 0.6–4.47)
LYMPHOCYTES NFR BLD AUTO: 27 % (ref 14–44)
MCH RBC QN AUTO: 28.3 PG (ref 26.8–34.3)
MCHC RBC AUTO-ENTMCNC: 30.7 G/DL (ref 31.4–37.4)
MCV RBC AUTO: 92 FL (ref 82–98)
MONOCYTES # BLD AUTO: 0.6 THOUSAND/ΜL (ref 0.17–1.22)
MONOCYTES NFR BLD AUTO: 6 % (ref 4–12)
NEUTROPHILS # BLD AUTO: 5.19 THOUSANDS/ΜL (ref 1.85–7.62)
NEUTS SEG NFR BLD AUTO: 56 % (ref 43–75)
NRBC BLD AUTO-RTO: 0 /100 WBCS
PLATELET # BLD AUTO: 331 THOUSANDS/UL (ref 149–390)
PMV BLD AUTO: 10.1 FL (ref 8.9–12.7)
POTASSIUM SERPL-SCNC: 3.5 MMOL/L (ref 3.5–5.3)
PROT SERPL-MCNC: 8.3 G/DL (ref 6.4–8.2)
RBC # BLD AUTO: 4.49 MILLION/UL (ref 3.81–5.12)
SODIUM SERPL-SCNC: 141 MMOL/L (ref 136–145)
TRIGL SERPL-MCNC: 120 MG/DL
TSH SERPL DL<=0.05 MIU/L-ACNC: 3.54 UIU/ML (ref 0.36–3.74)
URATE SERPL-MCNC: 5.2 MG/DL (ref 2–6.8)
WBC # BLD AUTO: 9.42 THOUSAND/UL (ref 4.31–10.16)

## 2020-11-16 PROCEDURE — 36415 COLL VENOUS BLD VENIPUNCTURE: CPT

## 2020-11-16 PROCEDURE — 85025 COMPLETE CBC W/AUTO DIFF WBC: CPT

## 2020-11-16 PROCEDURE — 3725F SCREEN DEPRESSION PERFORMED: CPT | Performed by: INTERNAL MEDICINE

## 2020-11-16 PROCEDURE — 80061 LIPID PANEL: CPT

## 2020-11-16 PROCEDURE — 99214 OFFICE O/P EST MOD 30 MIN: CPT | Performed by: INTERNAL MEDICINE

## 2020-11-16 PROCEDURE — 1160F RVW MEDS BY RX/DR IN RCRD: CPT | Performed by: INTERNAL MEDICINE

## 2020-11-16 PROCEDURE — 3078F DIAST BP <80 MM HG: CPT | Performed by: INTERNAL MEDICINE

## 2020-11-16 PROCEDURE — 82306 VITAMIN D 25 HYDROXY: CPT

## 2020-11-16 PROCEDURE — 3075F SYST BP GE 130 - 139MM HG: CPT | Performed by: INTERNAL MEDICINE

## 2020-11-16 PROCEDURE — 84550 ASSAY OF BLOOD/URIC ACID: CPT

## 2020-11-16 PROCEDURE — 1036F TOBACCO NON-USER: CPT | Performed by: INTERNAL MEDICINE

## 2020-11-16 PROCEDURE — 80053 COMPREHEN METABOLIC PANEL: CPT

## 2020-11-16 PROCEDURE — 84443 ASSAY THYROID STIM HORMONE: CPT

## 2020-11-27 DIAGNOSIS — T78.40XA ALLERGY, INITIAL ENCOUNTER: ICD-10-CM

## 2020-11-27 RX ORDER — FLUTICASONE PROPIONATE 50 MCG
SPRAY, SUSPENSION (ML) NASAL
Qty: 16 G | Refills: 5 | Status: SHIPPED | OUTPATIENT
Start: 2020-11-27 | End: 2021-06-07

## 2021-01-29 DIAGNOSIS — I63.9 CEREBROVASCULAR ACCIDENT (CVA), UNSPECIFIED MECHANISM (HCC): ICD-10-CM

## 2021-01-29 DIAGNOSIS — G45.9 TIA (TRANSIENT ISCHEMIC ATTACK): ICD-10-CM

## 2021-01-29 RX ORDER — ATORVASTATIN CALCIUM 40 MG/1
40 TABLET, FILM COATED ORAL DAILY
Qty: 90 TABLET | Refills: 1 | Status: SHIPPED | OUTPATIENT
Start: 2021-01-29 | End: 2021-02-26 | Stop reason: SDUPTHER

## 2021-01-29 RX ORDER — CLOPIDOGREL BISULFATE 75 MG/1
75 TABLET ORAL DAILY
Qty: 90 TABLET | Refills: 1 | Status: SHIPPED | OUTPATIENT
Start: 2021-01-29 | End: 2021-05-03 | Stop reason: SDUPTHER

## 2021-01-29 NOTE — TELEPHONE ENCOUNTER
Pt needs clopidogrel (PLAVIX) 75 mg tablet refilled, send to [facility] in [facility]  Pt takes atorvastatin (LIPITOR) 40 mg tablet  She has dry mouth and lips and drinking a lot  She was given generic brand, she needs Brand name  Please make this change  Pt called the pharmacy about lipitor, it will be too expensive for brand name and he told her about an OTC medicine for the dry mouth  She will continue getting the generic  She does not need this filled at this time

## 2021-02-03 ENCOUNTER — TELEMEDICINE (OUTPATIENT)
Dept: INTERNAL MEDICINE CLINIC | Facility: CLINIC | Age: 81
End: 2021-02-03
Payer: COMMERCIAL

## 2021-02-03 VITALS — BODY MASS INDEX: 21.68 KG/M2 | WEIGHT: 127 LBS | HEIGHT: 64 IN

## 2021-02-03 DIAGNOSIS — J06.9 UPPER RESPIRATORY TRACT INFECTION, UNSPECIFIED TYPE: ICD-10-CM

## 2021-02-03 DIAGNOSIS — Z20.822 CLOSE EXPOSURE TO COVID-19 VIRUS: ICD-10-CM

## 2021-02-03 DIAGNOSIS — J06.9 UPPER RESPIRATORY TRACT INFECTION, UNSPECIFIED TYPE: Primary | ICD-10-CM

## 2021-02-03 PROCEDURE — U0005 INFEC AGEN DETEC AMPLI PROBE: HCPCS | Performed by: INTERNAL MEDICINE

## 2021-02-03 PROCEDURE — 99213 OFFICE O/P EST LOW 20 MIN: CPT | Performed by: INTERNAL MEDICINE

## 2021-02-03 PROCEDURE — U0003 INFECTIOUS AGENT DETECTION BY NUCLEIC ACID (DNA OR RNA); SEVERE ACUTE RESPIRATORY SYNDROME CORONAVIRUS 2 (SARS-COV-2) (CORONAVIRUS DISEASE [COVID-19]), AMPLIFIED PROBE TECHNIQUE, MAKING USE OF HIGH THROUGHPUT TECHNOLOGIES AS DESCRIBED BY CMS-2020-01-R: HCPCS | Performed by: INTERNAL MEDICINE

## 2021-02-03 NOTE — PATIENT INSTRUCTIONS
COVID-19 (Coronavirus Disease 2019)   WHAT YOU NEED TO KNOW:   COVID-19 is the disease caused by the novel (new) coronavirus first discovered in December 2019  Coronaviruses generally cause upper respiratory (nose, throat, and lung) infections, such as a cold  The new virus can also cause serious lower respiratory conditions, such as pneumonia or acute respiratory distress syndrome (ARDS)  Anyone can develop serious problems from the new virus, but your risk is higher if you are 65 or older  A weak immune system, diabetes, or a heart or lung condition can also increase your risk  DISCHARGE INSTRUCTIONS:   If you think you or someone you know may be infected:  Do the following to protect others:  · If emergency care is needed,  tell the  about the possible infection, or call ahead and tell the emergency department  · Call a healthcare provider  for instructions if symptoms are mild  Anyone who may be infected should not  arrive without calling first  The provider will need to protect staff members and other patients  · The person who may be infected needs to wear a face covering  while getting medical care  This will help lower the risk of infecting others  Coverings are not used for anyone who is younger than 2 years, has breathing problems, or cannot remove it  The provider can give you instructions for anyone who cannot wear a covering  Call your local emergency number (911 in the [facility]) or go to the emergency department if:   · You have trouble breathing or shortness of breath at rest     · You have chest pain or pressure that lasts longer than 5 minutes  · You become confused or hard to wake  · Your lips or face are blue  · You have a fever of 104°F (40°C) or higher  Call your doctor if:   · You do not  have symptoms of COVID-19 but had close physical contact within 14 days with someone who tested positive  · You have questions or concerns about your condition or care      Medicines: You may need any of the following for mild symptoms:  · Decongestants  help reduce nasal congestion and help you breathe more easily  If you take decongestant pills, they may make you feel restless or cause problems with your sleep  Do not use decongestant sprays for more than a few days  · Cough suppressants  help reduce coughing  Ask your healthcare provider which type of cough medicine is best for you  · Acetaminophen  decreases pain and fever  It is available without a doctor's order  Ask how much to take and how often to take it  Follow directions  Read the labels of all other medicines you are using to see if they also contain acetaminophen, or ask your doctor or pharmacist  Acetaminophen can cause liver damage if not taken correctly  Do not use more than 4 grams (4,000 milligrams) total of acetaminophen in one day  · NSAIDs , such as ibuprofen, help decrease swelling, pain, and fever  NSAIDs can cause stomach bleeding or kidney problems in certain people  If you take blood thinner medicine, always ask your healthcare provider if NSAIDs are safe for you  Always read the medicine label and follow directions  · Take your medicine as directed  Contact your healthcare provider if you think your medicine is not helping or if you have side effects  Tell him or her if you are allergic to any medicine  Keep a list of the medicines, vitamins, and herbs you take  Include the amounts, and when and why you take them  Bring the list or the pill bottles to follow-up visits  Carry your medicine list with you in case of an emergency  How the 2019 coronavirus spreads: The virus spreads quickly and easily  You can become infected if you are in contact with a large amount of the virus, even for a short time  You can also become infected by being around a small amount of virus for a long time   The following are ways the virus is thought to spread, but more information may be coming:  · Droplets are the most common way all coronaviruses spread  The virus can travel in droplets that form when a person talks, coughs, or sneezes  Anyone who breathes in the droplets or gets them in his or her eyes can become infected with the virus  Close personal contact with an infected person is thought to be the main way the virus spreads  Close personal contact means you are within 6 feet (2 meters) of the person  · Person-to-person contact can spread the virus  For example, a person with the virus on his or her hands can spread it by shaking hands with someone  At this time, it does not appear that the virus can be passed to a baby during pregnancy or delivery  The baby can be infected after he or she is born through person-to-person contact  The virus also does not appear to spread in breast milk  If you are pregnant or breastfeeding, talk to your healthcare provider or obstetrician about any concerns you have  · The virus can stay on objects and surfaces  A person can get the virus on his or her hands by touching the object or surface  Infection happens if the person then touches his or her eyes or mouth with unwashed hands  It is not yet known how long the virus can stay on an object or surface  That is why it is important to clean all surfaces that are used regularly  · An infected animal may be able to infect a person who touches it  This may happen at live markets or on a farm  How everyone can lower the risk for COVID-19:  The best way to prevent infection is to avoid anyone who is infected, but this can be hard to do  An infected person can spread the virus before signs or symptoms begin, or even if signs or symptoms never develop  The following can help lower the risk for infection:      · Wash your hands often throughout the day  Use soap and water  Rub your soapy hands together, lacing your fingers  Wash the front and back of each hand, and in between your fingers   Use the fingers of one hand to scrub under the fingernails of the other hand  Wash for at least 20 seconds  Rinse with warm, running water for several seconds  Then dry your hands with a clean towel or paper towel  Use hand  that contains alcohol if soap and water are not available  Do not touch your eyes, nose, or mouth without washing your hands first  Teach children how to wash their hands and use hand   · Cover a sneeze or cough  This prevents droplets from traveling from you to others  Turn your face away and cover your mouth and nose with a tissue  Throw the tissue away  Use the bend of your arm if a tissue is not available  Then wash your hands well with soap and water or use hand   Turn and cover your face if you are around someone who is sneezing or coughing  Teach children how to cover a cough or sneeze  · Follow worldwide, national, and local social distancing guidelines  Social distancing means people avoid close physical contact so the virus cannot spread from one person to another  Keep at least 6 feet (2 meters) between you and others  Also keep this distance from anyone who comes to your home, such as someone making a delivery  · Make a habit of not touching your face  It is not known how long the virus can stay on objects and surfaces  If you get the virus on your hands, you can transfer it to your eyes, nose, or mouth and become infected  You can also transfer it to objects, surfaces, or people  Be aware of what you touch when you go out  Examples include handrails and elevator buttons  Try not to touch anything with bare hands unless it is necessary  Wash your hands before you leave your home and when you return  · Clean and disinfect high-touch surfaces and objects often  Use a disinfecting solution or wipes  You can make a solution by diluting 4 teaspoons of bleach in 1 quart (4 cups) of water   Clean and disinfect even if you think no one living in or coming to your home is infected with the virus  You can wipe items with a disinfecting cloth before you bring them into your home  Wash your hands after you handle anything you bring into your home  · Make your immune system as healthy as possible  A weakened immune system makes you more vulnerable to the new coronavirus  No COVID-19 vaccine is available yet  Vaccines such as the flu and pneumonia vaccines can help your immune system  Your healthcare provider can tell you which vaccines to get, and when to get them  Keep your immune system as strong as possible  Do not smoke  Eat healthy foods, exercise regularly, and try to manage stress  Go to bed and wake up at the same times each day  Social distancing guidelines:  National and local social distancing rules vary  Rules may change over time as restrictions are lifted  Restrictions may return if an outbreak happens where you live  It is important to know and follow all current social distancing rules in your area  The following are general guidelines:  · Limit trips out of your home  You may be able to have food, medicines, and other supplies delivered  If possible, have delivered items left at your door or other area  Try not to have someone hand you an item  You will be so close to the person that the virus can spread between you  · Do not have close physical contact with anyone who does not live in your home  Do not shake hands with, hug, or kiss a person as a greeting  Stand or walk as far from others as possible  If you must use public transportation (such as a bus or subway), try to sit or stand away from others  You can stay safely connected with others through phone calls, e-mail messages, social media websites, and video chats  Check in on anyone who may be having a hard time socially distancing, or who lives alone  Ask administrators at nursing homes or long-term care facilities how you can safely communicate with someone living there      · Wear a cloth face covering around others who do not live in your home  Face coverings help prevent the virus from spreading to others in droplets  You can use a clear face covering if someone needs to read your lips  This is a cloth covering that has plastic over the mouth area so your lips can be seen  Do not use coverings that have breathing valves or vents  The virus can travel out of the valve or vent and be spread to others  Do not take your covering off to talk, cough, or sneeze  Do not use coverings on children younger than 2 years or on anyone who has breathing problems or cannot remove it  · Only allow medical or other necessary professionals into your home  Wear your face covering, and remind professionals to wear a face covering  Remind them to wash their hands when they arrive and before they leave  Do not  let anyone who does not live in your home in, even if the person is not sick  A person can pass the virus to others before symptoms of COVID-19 begin  Some people never even develop symptoms  Children commonly have mild symptoms or no symptoms  It may be hard to tell a child not to hug or kiss you  Explain that this is how he or she can help you stay healthy  · Do not go to someone else's home unless it is necessary  Do not go over to visit, even if the person is lonely  Only go if you need to help him or her  Make sure you both wear face coverings while you are there  · Avoid large gatherings and crowds  Gatherings or crowds of 10 or more individuals can cause the virus to spread  Examples of gatherings include parties, sporting events, [employer] services, and conferences  Crowds may form at beaches, [place], and tourist attractions  Protect yourself by staying away from large gatherings and crowds  · Ask your healthcare provider for other ways to have appointments  You may be able to have appointments without having to go into the provider's office  Some providers offer phone, video, or other types of appointments  You may also be able to get prescriptions for a few months of your medicines at a time  · Stay safe if you must go out to work  You may have a job that can only be done outside your home  Keep physical distance between you and other workers as much as possible  Follow your employer's rules so everyone stays safe  If you have COVID-19 and are recovering at home:  Healthcare providers will give you specific instructions to follow  The following are general guidelines to remind you how to keep others safe until you are well:  · Wash your hands often  Use soap and water as much as possible  You can use hand  that contains alcohol if soap and water are not available  Do not share towels with anyone  If you use paper towels, throw them away in a lined trash can kept in your room or area  Use a covered trash can, if possible  · Do not go out of your home unless it is necessary  You may have to go to your healthcare provider's office for check-ups or to get prescription refills  Do not arrive at the provider's office without an appointment  Providers have to make their offices safe for staff and other patients  · Do not have close physical contact with anyone unless it is necessary  Only have close physical contact with a person giving direct care, or a baby or child you must care for  Family members and friends should not visit you  If possible, stay in a separate area or room of your home if you live with others  No one should go into the area or room except to give you care  You can visit with others by phone, video chat, e-mail, or similar systems  It is important to stay connected with others in your life while you recover  · Wear a face covering while others are near you  This can help prevent droplets from spreading the virus when you talk, sneeze, or cough  Put the covering on before anyone comes into your room or area   Remind the person to cover his or her nose and mouth before going in to provide care for you  · Do not share items  Do not share dishes, towels, or other items with anyone  Items need to be washed after you use them  · Protect your baby  Wash your hands with soap and water often throughout the day  Wear a clean face covering while you breastfeed, or while you express or pump breast milk  If possible, ask someone who is well to care for your baby  You can put breast milk in bottles for the person to use, if needed  Talk to your healthcare provider if you have any questions or concerns about caring for or bonding with your baby  He or she will tell you when to bring your baby in for check-ups and vaccines  He or she will also tell you what to do if you think your baby was infected with the new virus  · Do not handle live animals  Until more is known, it is best not to touch, play with, or handle live animals  Some animals, including pets, have been infected with the new coronavirus  Do not handle or care for animals until you are well  Care includes feeding, petting, and cuddling your pet  Do not let your pet lick you or share your food  Ask someone who is not infected to take care of your pet, if possible  If you must care for a pet, wear a face covering  Wash your hands before and after you give care  · Follow directions from your healthcare provider for being around others after you recover  You will need to wait at least 10 days after symptoms first appeared  Then you will need to have no fever for 24 hours without fever medicine, and no other symptoms  A loss of taste or smell may continue for several months  It is considered okay to be around others if this is your only symptom  It is not known for sure if or for how long a recovered person can pass the virus to others  Your provider may give you instructions, such as continuing social distancing or wearing a face covering around others      How to take care of someone who has COVID-19:  If the person lives in another home, arrange for a time to give care  Remember to bring a few pairs of disposable gloves and a cloth face covering  The following are general guidelines to help you safely care for anyone who has COVID-19:  · Wash your hands often  Wash before and after you go into the person's home, area, or room  Throw paper towels away in a lined trash can that has a lid, if possible  · Do not allow others to go near the person  No one should come into the person's home unless it is necessary  If possible, the person should be in a separate area or room if he or she lives with others  Keep the room's door shut unless you need to go in or out  Have others call, video chat, or e-mail the person if he or she is feeling well enough  The person may feel lonely if he or she is kept separate for a long period of time  Safe communication can help him or her stay connected to family and friends  · Make sure the person's room has good air flow  You may be able to open the window if the weather allows  An air conditioner can also be turned on to help air move  · Contact the person before you go in to give care  Make sure the person is wearing a face covering  Remind him or her to wash his or her hands with soap and water  He or she can use hand  that contains alcohol if soap and water are not available  Put on a face covering before you go in to give care  · Wear gloves while you give care and clean  Clean items the person uses often  Clean countertops, cooking surfaces, and the fronts and insides of the microwave and refrigerator  Clean the shower, toilet, the area around the toilet, the sink, the area around the sink, and faucets  Gather used laundry or bedding  Wash and dry items on the warmest settings the fabric allows  Wash dishes and silverware in hot, soapy water or in a   · Anything you throw away needs to go into a lined trash can    When you need to empty the trash, close the open end of the lining and tie it closed  This helps prevent items the virus is on from spilling out of the trash  Remove your gloves and throw them away  Wash your hands  Follow up with your doctor as directed:  Write down your questions so you remember to ask them during your visits  For more information:   · Centers for Disease Control and Prevention  [address] , [address]  Phone: 9- 242 - 317-3850  Web Address: [employer] com br    © Copyright [facility] [address] Information is for End User's use only and may not be sold, redistributed or otherwise used for commercial purposes  All illustrations and images included in CareNotes® are the copyrighted property of A D A M , Inc  or [facility] Thomas   The above information is an  only  It is not intended as medical advice for individual conditions or treatments  Talk to your doctor, nurse or pharmacist before following any medical regimen to see if it is safe and effective for you

## 2021-02-03 NOTE — PROGRESS NOTES
COVID-19 Virtual Visit     Assessment/Plan:    Problem List Items Addressed This Visit     None      Visit Diagnoses     Upper respiratory tract infection, unspecified type    -  Primary    Relevant Orders    Novel Coronavirus (Covid-19),PCR [facility] - Collected at [facility] or [facility]    Close exposure to COVID-19 virus        Relevant Orders    Novel Coronavirus (Covid-19),PCR [facility] - Collected at [place] [address] or [facility]         Disposition:     I referred patient to one of our centralized sites for a COVID-19 swab  A/P: Day # 3 since onset of COVID s/s  Covid test will be ordered  Doing ok and mainly nasal congestion and chill  Slight SOB, but no conversational dyspnea   Start isolation/quarantine, increase fluids, PRN motrin/tylenol, and breathing exercises  RTC one day for f/u  I have spent 20 minutes directly with the patient  Greater than 50% of this time was spent in counseling/coordination of care regarding: diagnostic results, prognosis, risks and benefits of treatment options, instructions for management, patient and family education, importance of treatment compliance, risk factor reductions and impressions  Encounter provider Patricia Isaac DO    Provider located at [address] [place] [id number]    Recent Visits  No visits were found meeting these conditions  Showing recent visits within past 7 days and meeting all other requirements     Today's Visits  Date Type Provider Dept   02/03/21 Telemedicine Patricia Isaac DO  Harshal Aniya today's visits and meeting all other requirements     Future Appointments  No visits were found meeting these conditions  Showing future appointments within next 150 days and meeting all other requirements      This virtual check-in was done via [employer] and patient was informed that this is a secure, HIPAA-compliant platform  She agrees to proceed      Patient agrees to participate in a virtual check in via telephone or video visit instead of presenting to the office to address urgent/immediate medical needs  Patient is aware this is a billable service  After connecting through [facility], the patient was identified by name and date of birth  Duran Keene was informed that this was a telemedicine visit and that the exam was being conducted confidentially over secure lines  My office door was closed  No one else was in the room  Duran Keene acknowledged consent and understanding of privacy and security of the telemedicine visit  I informed the patient that I have reviewed her record in Epic and presented the opportunity for her to ask any questions regarding the visit today  The patient agreed to participate  Subjective: Duran Keene is a [de-identified] y o  female who is concerned about COVID-19  Patient's symptoms include fever, chills, cough, shortness of breath and headache  Patient denies fatigue, congestion, rhinorrhea, sore throat, anosmia, loss of taste, chest tightness, abdominal pain, nausea, vomiting, diarrhea and myalgias       Date of symptom onset: 2/1/2021    Exposure:   Contact with a person who is under investigation (PUI) for or who is positive for COVID-19 within the last 14 days?: Yes    Hospitalized recently for fever and/or lower respiratory symptoms?: No      Currently a healthcare worker that is involved in direct patient care?: No      Works in a special setting where the risk of COVID-19 transmission may be high? (this may include long-term care, correctional and [facility] facilities; homeless shelters; assisted-living facilities and group homes ): No      No results found for: Alfonso Alejandra, [address] [facility], [address] [facility],[address], [place] [address]  Past Medical History:   Diagnosis Date    Acute CVA (cerebrovascular accident) ([address] )     Chronic kidney disease     Dementia [address] [address]     Gout     Hypertension     Osteoporosis      Past Surgical History:   Procedure Laterality Date    APPENDECTOMY      HYSTERECTOMY      TUBAL LIGATION      UTERINE FIBROID SURGERY       Current Outpatient Medications   Medication Sig Dispense Refill    alendronate (FOSAMAX) 70 mg tablet Take 1 tablet by mouth once a week 12 tablet 3    allopurinol (ZYLOPRIM) 100 mg tablet Take 1 tablet (100 mg total) by mouth daily 90 tablet 1    amLODIPine (NORVASC) 5 mg tablet Take 1 tablet (5 mg total) by mouth daily 90 tablet 3    atenolol (TENORMIN) 50 mg tablet Take 1 tablet (50 mg total) by mouth daily 30 tablet 5    atorvastatin (LIPITOR) 40 mg tablet Take 1 tablet (40 mg total) by mouth daily 90 tablet 1    clopidogrel (PLAVIX) 75 mg tablet Take 1 tablet (75 mg total) by mouth daily 90 tablet 1    donepezil (ARICEPT) 5 mg tablet Take 1 tablet (5 mg total) by mouth daily at bedtime 90 tablet 3    ergocalciferol (VITAMIN D2) 50,000 units Take 1 capsule (50,000 Units total) by mouth once a week 12 capsule 3    fluticasone (FLONASE) 50 mcg/act nasal spray Use 1 spray(s) in each nostril once daily 16 g 5    hydrochlorothiazide (HYDRODIURIL) 50 mg tablet Take 1 tablet (50 mg total) by mouth daily 90 tablet 1    lisinopril (ZESTRIL) 40 mg tablet Take 1 tablet (40 mg total) by mouth daily 90 tablet 1    loratadine (CLARITIN) 10 mg tablet Take 1 tablet (10 mg total) by mouth daily 90 tablet 0    Multiple Vitamins-Minerals (MULTI ADULT GUMMIES) CHEW Chew 1 tablet daily      OMEGA-3 FATTY ACIDS-VITAMIN E PO Take 1,000 mg by mouth      pentoxifylline (TRENtal) 400 mg ER tablet TAKE 1 TABLET BY MOUTH THREE TIMES DAILY WITH MEALS 90 tablet 1     No current facility-administered medications for this visit  Allergies   Allergen Reactions    Oxycodone-Acetaminophen Rash       Review of Systems   Constitutional: Positive for activity change, chills and fever  Negative for diaphoresis and fatigue  HENT: Negative for congestion, postnasal drip, rhinorrhea, sinus pressure, sinus pain and sore throat  No loss of taste/smell  Respiratory: Positive for cough and shortness of breath  Negative for chest tightness and wheezing  Cardiovascular: Negative for chest pain, palpitations and leg swelling  Gastrointestinal: Negative for abdominal pain, constipation, diarrhea, nausea and vomiting  Genitourinary: Negative for difficulty urinating, dysuria and frequency  Musculoskeletal: Negative for arthralgias, gait problem and myalgias  Neurological: Positive for headaches  Negative for light-headedness  Psychiatric/Behavioral: Negative for confusion  The patient is not nervous/anxious  Objective:    Vitals:    02/03/21 1133   Weight: 57 6 kg (127 lb)   Height: 5' 4" (1 626 m)       Physical Exam  Vitals signs and nursing note reviewed  Constitutional:       Appearance: Normal appearance  HENT:      Head: Normocephalic and atraumatic  Mouth/Throat:      Mouth: Mucous membranes are moist    Eyes:      Extraocular Movements: Extraocular movements intact  Conjunctiva/sclera: Conjunctivae normal       Pupils: Pupils are equal, round, and reactive to light  Pulmonary:      Effort: Pulmonary effort is normal  No respiratory distress  Neurological:      General: No focal deficit present  Mental Status: She is alert and oriented to person, place, and time  Mental status is at baseline  Psychiatric:         Mood and Affect: Mood normal          Behavior: Behavior normal          Thought Content: Thought content normal          Judgment: Judgment normal        VIRTUAL VISIT DISCLAIMER    Shagufta Velasquez acknowledges that she has consented to an online visit or consultation  She understands that the online visit is based solely on information provided by her, and that, in the absence of a face-to-face physical evaluation by the physician, the diagnosis she receives is both limited and provisional in terms of accuracy and completeness   This is not intended to replace a full medical face-to-face evaluation by the physician  Barry Velasquez understands and accepts these terms

## 2021-02-04 ENCOUNTER — TELEMEDICINE (OUTPATIENT)
Dept: INTERNAL MEDICINE CLINIC | Facility: CLINIC | Age: 81
End: 2021-02-04
Payer: COMMERCIAL

## 2021-02-04 DIAGNOSIS — Z20.822 CLOSE EXPOSURE TO COVID-19 VIRUS: ICD-10-CM

## 2021-02-04 DIAGNOSIS — J06.9 UPPER RESPIRATORY TRACT INFECTION, UNSPECIFIED TYPE: Primary | ICD-10-CM

## 2021-02-04 LAB — SARS-COV-2 RNA RESP QL NAA+PROBE: POSITIVE

## 2021-02-04 PROCEDURE — 99213 OFFICE O/P EST LOW 20 MIN: CPT | Performed by: INTERNAL MEDICINE

## 2021-02-04 NOTE — PATIENT INSTRUCTIONS
COVID-19 (Coronavirus Disease 2019)   WHAT YOU NEED TO KNOW:   COVID-19 is the disease caused by the novel (new) coronavirus first discovered in December 2019  Coronaviruses generally cause upper respiratory (nose, throat, and lung) infections, such as a cold  The new virus can also cause serious lower respiratory conditions, such as pneumonia or acute respiratory distress syndrome (ARDS)  Anyone can develop serious problems from the new virus, but your risk is higher if you are 65 or older  A weak immune system, diabetes, or a heart or lung condition can also increase your risk  DISCHARGE INSTRUCTIONS:   If you think you or someone you know may be infected:  Do the following to protect others:  · If emergency care is needed,  tell the  about the possible infection, or call ahead and tell the emergency department  · Call a healthcare provider  for instructions if symptoms are mild  Anyone who may be infected should not  arrive without calling first  The provider will need to protect staff members and other patients  · The person who may be infected needs to wear a face covering  while getting medical care  This will help lower the risk of infecting others  Coverings are not used for anyone who is younger than 2 years, has breathing problems, or cannot remove it  The provider can give you instructions for anyone who cannot wear a covering  Call your local emergency number (911 in the [address] [facility]) or go to the emergency department if:   · You have trouble breathing or shortness of breath at rest     · You have chest pain or pressure that lasts longer than 5 minutes  · You become confused or hard to wake  · Your lips or face are blue  · You have a fever of 104°F (40°C) or higher  Call your doctor if:   · You do not  have symptoms of COVID-19 but had close physical contact within 14 days with someone who tested positive  · You have questions or concerns about your condition or care      Medicines: You may need any of the following for mild symptoms:  · Decongestants  help reduce nasal congestion and help you breathe more easily  If you take decongestant pills, they may make you feel restless or cause problems with your sleep  Do not use decongestant sprays for more than a few days  · Cough suppressants  help reduce coughing  Ask your healthcare provider which type of cough medicine is best for you  · Acetaminophen  decreases pain and fever  It is available without a doctor's order  Ask how much to take and how often to take it  Follow directions  Read the labels of all other medicines you are using to see if they also contain acetaminophen, or ask your doctor or pharmacist  Acetaminophen can cause liver damage if not taken correctly  Do not use more than 4 grams (4,000 milligrams) total of acetaminophen in one day  · NSAIDs , such as ibuprofen, help decrease swelling, pain, and fever  NSAIDs can cause stomach bleeding or kidney problems in certain people  If you take blood thinner medicine, always ask your healthcare provider if NSAIDs are safe for you  Always read the medicine label and follow directions  · Take your medicine as directed  Contact your healthcare provider if you think your medicine is not helping or if you have side effects  Tell him or her if you are allergic to any medicine  Keep a list of the medicines, vitamins, and herbs you take  Include the amounts, and when and why you take them  Bring the list or the pill bottles to follow-up visits  Carry your medicine list with you in case of an emergency  How the 2019 coronavirus spreads: The virus spreads quickly and easily  You can become infected if you are in contact with a large amount of the virus, even for a short time  You can also become infected by being around a small amount of virus for a long time   The following are ways the virus is thought to spread, but more information may be coming:  · Droplets are the most common way all coronaviruses spread  The virus can travel in droplets that form when a person talks, coughs, or sneezes  Anyone who breathes in the droplets or gets them in his or her eyes can become infected with the virus  Close personal contact with an infected person is thought to be the main way the virus spreads  Close personal contact means you are within 6 feet (2 meters) of the person  · Person-to-person contact can spread the virus  For example, a person with the virus on his or her hands can spread it by shaking hands with someone  At this time, it does not appear that the virus can be passed to a baby during pregnancy or delivery  The baby can be infected after he or she is born through person-to-person contact  The virus also does not appear to spread in breast milk  If you are pregnant or breastfeeding, talk to your healthcare provider or obstetrician about any concerns you have  · The virus can stay on objects and surfaces  A person can get the virus on his or her hands by touching the object or surface  Infection happens if the person then touches his or her eyes or mouth with unwashed hands  It is not yet known how long the virus can stay on an object or surface  That is why it is important to clean all surfaces that are used regularly  · An infected animal may be able to infect a person who touches it  This may happen at live markets or on a farm  How everyone can lower the risk for COVID-19:  The best way to prevent infection is to avoid anyone who is infected, but this can be hard to do  An infected person can spread the virus before signs or symptoms begin, or even if signs or symptoms never develop  The following can help lower the risk for infection:      · Wash your hands often throughout the day  Use soap and water  Rub your soapy hands together, lacing your fingers  Wash the front and back of each hand, and in between your fingers   Use the fingers of one hand to scrub under the fingernails of the other hand  Wash for at least 20 seconds  Rinse with warm, running water for several seconds  Then dry your hands with a clean towel or paper towel  Use hand  that contains alcohol if soap and water are not available  Do not touch your eyes, nose, or mouth without washing your hands first  Teach children how to wash their hands and use hand   · Cover a sneeze or cough  This prevents droplets from traveling from you to others  Turn your face away and cover your mouth and nose with a tissue  Throw the tissue away  Use the bend of your arm if a tissue is not available  Then wash your hands well with soap and water or use hand   Turn and cover your face if you are around someone who is sneezing or coughing  Teach children how to cover a cough or sneeze  · Follow worldwide, national, and local social distancing guidelines  Social distancing means people avoid close physical contact so the virus cannot spread from one person to another  Keep at least 6 feet (2 meters) between you and others  Also keep this distance from anyone who comes to your home, such as someone making a delivery  · Make a habit of not touching your face  It is not known how long the virus can stay on objects and surfaces  If you get the virus on your hands, you can transfer it to your eyes, nose, or mouth and become infected  You can also transfer it to objects, surfaces, or people  Be aware of what you touch when you go out  Examples include handrails and elevator buttons  Try not to touch anything with bare hands unless it is necessary  Wash your hands before you leave your home and when you return  · Clean and disinfect high-touch surfaces and objects often  Use a disinfecting solution or wipes  You can make a solution by diluting 4 teaspoons of bleach in 1 quart (4 cups) of water   Clean and disinfect even if you think no one living in or coming to your home is infected with the virus  You can wipe items with a disinfecting cloth before you bring them into your home  Wash your hands after you handle anything you bring into your home  · Make your immune system as healthy as possible  A weakened immune system makes you more vulnerable to the new coronavirus  No COVID-19 vaccine is available yet  Vaccines such as the flu and pneumonia vaccines can help your immune system  Your healthcare provider can tell you which vaccines to get, and when to get them  Keep your immune system as strong as possible  Do not smoke  Eat healthy foods, exercise regularly, and try to manage stress  Go to bed and wake up at the same times each day  Social distancing guidelines:  National and local social distancing rules vary  Rules may change over time as restrictions are lifted  Restrictions may return if an outbreak happens where you live  It is important to know and follow all current social distancing rules in your area  The following are general guidelines:  · Limit trips out of your home  You may be able to have food, medicines, and other supplies delivered  If possible, have delivered items left at your door or other area  Try not to have someone hand you an item  You will be so close to the person that the virus can spread between you  · Do not have close physical contact with anyone who does not live in your home  Do not shake hands with, hug, or kiss a person as a greeting  Stand or walk as far from others as possible  If you must use public transportation (such as a bus or subway), try to sit or stand away from others  You can stay safely connected with others through phone calls, e-mail messages, social media websites, and video chats  Check in on anyone who may be having a hard time socially distancing, or who lives alone  Ask administrators at nursing homes or long-term care facilities how you can safely communicate with someone living there      · Wear a cloth face covering around others who do not live in your home  Face coverings help prevent the virus from spreading to others in droplets  You can use a clear face covering if someone needs to read your lips  This is a cloth covering that has plastic over the mouth area so your lips can be seen  Do not use coverings that have breathing valves or vents  The virus can travel out of the valve or vent and be spread to others  Do not take your covering off to talk, cough, or sneeze  Do not use coverings on children younger than 2 years or on anyone who has breathing problems or cannot remove it  · Only allow medical or other necessary professionals into your home  Wear your face covering, and remind professionals to wear a face covering  Remind them to wash their hands when they arrive and before they leave  Do not  let anyone who does not live in your home in, even if the person is not sick  A person can pass the virus to others before symptoms of COVID-19 begin  Some people never even develop symptoms  Children commonly have mild symptoms or no symptoms  It may be hard to tell a child not to hug or kiss you  Explain that this is how he or she can help you stay healthy  · Do not go to someone else's home unless it is necessary  Do not go over to visit, even if the person is lonely  Only go if you need to help him or her  Make sure you both wear face coverings while you are there  · Avoid large gatherings and crowds  Gatherings or crowds of 10 or more individuals can cause the virus to spread  Examples of gatherings include parties, sporting events, [employer] services, and conferences  Crowds may form at beaches, caleor, and tourist attractions  Protect yourself by staying away from large gatherings and crowds  · Ask your healthcare provider for other ways to have appointments  You may be able to have appointments without having to go into the provider's office  Some providers offer phone, video, or other types of appointments  You may also be able to get prescriptions for a few months of your medicines at a time  · Stay safe if you must go out to work  You may have a job that can only be done outside your home  Keep physical distance between you and other workers as much as possible  Follow your employer's rules so everyone stays safe  If you have COVID-19 and are recovering at home:  Healthcare providers will give you specific instructions to follow  The following are general guidelines to remind you how to keep others safe until you are well:  · Wash your hands often  Use soap and water as much as possible  You can use hand  that contains alcohol if soap and water are not available  Do not share towels with anyone  If you use paper towels, throw them away in a lined trash can kept in your room or area  Use a covered trash can, if possible  · Do not go out of your home unless it is necessary  You may have to go to your healthcare provider's office for check-ups or to get prescription refills  Do not arrive at the provider's office without an appointment  Providers have to make their offices safe for staff and other patients  · Do not have close physical contact with anyone unless it is necessary  Only have close physical contact with a person giving direct care, or a baby or child you must care for  Family members and friends should not visit you  If possible, stay in a separate area or room of your home if you live with others  No one should go into the area or room except to give you care  You can visit with others by phone, video chat, e-mail, or similar systems  It is important to stay connected with others in your life while you recover  · Wear a face covering while others are near you  This can help prevent droplets from spreading the virus when you talk, sneeze, or cough  Put the covering on before anyone comes into your room or area   Remind the person to cover his or her nose and mouth before going in to provide care for you  · Do not share items  Do not share dishes, towels, or other items with anyone  Items need to be washed after you use them  · Protect your baby  Wash your hands with soap and water often throughout the day  Wear a clean face covering while you breastfeed, or while you express or pump breast milk  If possible, ask someone who is well to care for your baby  You can put breast milk in bottles for the person to use, if needed  Talk to your healthcare provider if you have any questions or concerns about caring for or bonding with your baby  He or she will tell you when to bring your baby in for check-ups and vaccines  He or she will also tell you what to do if you think your baby was infected with the new virus  · Do not handle live animals  Until more is known, it is best not to touch, play with, or handle live animals  Some animals, including pets, have been infected with the new coronavirus  Do not handle or care for animals until you are well  Care includes feeding, petting, and cuddling your pet  Do not let your pet lick you or share your food  Ask someone who is not infected to take care of your pet, if possible  If you must care for a pet, wear a face covering  Wash your hands before and after you give care  · Follow directions from your healthcare provider for being around others after you recover  You will need to wait at least 10 days after symptoms first appeared  Then you will need to have no fever for 24 hours without fever medicine, and no other symptoms  A loss of taste or smell may continue for several months  It is considered okay to be around others if this is your only symptom  It is not known for sure if or for how long a recovered person can pass the virus to others  Your provider may give you instructions, such as continuing social distancing or wearing a face covering around others      How to take care of someone who has COVID-19:  If the person lives in another home, arrange for a time to give care  Remember to bring a few pairs of disposable gloves and a cloth face covering  The following are general guidelines to help you safely care for anyone who has COVID-19:  · Wash your hands often  Wash before and after you go into the person's home, area, or room  Throw paper towels away in a lined trash can that has a lid, if possible  · Do not allow others to go near the person  No one should come into the person's home unless it is necessary  If possible, the person should be in a separate area or room if he or she lives with others  Keep the room's door shut unless you need to go in or out  Have others call, video chat, or e-mail the person if he or she is feeling well enough  The person may feel lonely if he or she is kept separate for a long period of time  Safe communication can help him or her stay connected to family and friends  · Make sure the person's room has good air flow  You may be able to open the window if the weather allows  An air conditioner can also be turned on to help air move  · Contact the person before you go in to give care  Make sure the person is wearing a face covering  Remind him or her to wash his or her hands with soap and water  He or she can use hand  that contains alcohol if soap and water are not available  Put on a face covering before you go in to give care  · Wear gloves while you give care and clean  Clean items the person uses often  Clean countertops, cooking surfaces, and the fronts and insides of the microwave and refrigerator  Clean the shower, toilet, the area around the toilet, the sink, the area around the sink, and faucets  Gather used laundry or bedding  Wash and dry items on the warmest settings the fabric allows  Wash dishes and silverware in hot, soapy water or in a   · Anything you throw away needs to go into a lined trash can    When you need to empty the trash, close the open end of the lining and tie it closed  This helps prevent items the virus is on from spilling out of the trash  Remove your gloves and throw them away  Wash your hands  Follow up with your doctor as directed:  Write down your questions so you remember to ask them during your visits  For more information:   · Centers for Disease Control and Prevention  [address] , [address]  Phone: 9- [phone]  Web Address: [employer] com br    © Copyright [address] [place] [address] Drive Information is for End User's use only and may not be sold, redistributed or otherwise used for commercial purposes  All illustrations and images included in CareNotes® are the copyrighted property of [employer] D A M [employer]  or [facility]   The above information is an  only  It is not intended as medical advice for individual conditions or treatments  Talk to your doctor, nurse or pharmacist before following any medical regimen to see if it is safe and effective for you

## 2021-02-04 NOTE — PROGRESS NOTES
COVID-19 Virtual Visit     Assessment/Plan:    Problem List Items Addressed This Visit     None      Visit Diagnoses     Upper respiratory tract infection, unspecified type    -  Primary    Close exposure to COVID-19 virus             Disposition:     I recommended continued isolation until at least 24 hours have passed since recovery defined as resolution of fever without the use of fever-reducing medications AND improvement in COVID symptoms AND 10 days have passed since onset of symptoms (or 10 days have passed since date of first positive viral diagnostic test for asymptomatic patients)  A/P: Day # 4 since onset of COVID s/s  Covid test still pending  Doing well and mainly fatigue with a slight cough  NO fevers or SOB  Continue isolation/quarantine, increase fluids, PRN motrin/tylenol, and breathing exercises  RTC one day for f/u  I have spent 15 minutes directly with the patient  Greater than 50% of this time was spent in counseling/coordination of care regarding: diagnostic results, prognosis, risks and benefits of treatment options, patient and family education, importance of treatment compliance, risk factor reductions and impressions  Encounter provider Nikhil Pena DO    Provider located at [address] [place] [phone]    Recent Visits  Date Type Provider Dept   02/03/21 Telemedicine DO Viktor Pollard recent visits within past 7 days and meeting all other requirements     Today's Visits  Date Type Provider Dept   02[facility]04[facility] visits and meeting all other requirements     Future Appointments  No visits were found meeting these conditions     Showing future appointments within next 150 days and meeting all other requirements        Patient agrees to participate in a virtual check in via telephone or video visit instead of presenting to the office to address urgent/immediate medical needs  Patient is aware this is a billable service  After connecting through Telephone, the patient was identified by name and date of birth  Elba Pandya was informed that this was a telemedicine visit and that the exam was being conducted confidentially over secure lines  My office door was closed  No one else was in the room  Elba Pandya acknowledged consent and understanding of privacy and security of the telemedicine visit  I informed the patient that I have reviewed her record in Epic and presented the opportunity for her to ask any questions regarding the visit today  The patient agreed to participate  It was my intent to perform this visit via video technology but the patient was not able to do a video connection so the visit was completed via audio telephone only  Subjective: Elba Pandya is a [de-identified] y o  female who has been screened for COVID-19  Symptom change since last report: unchanged  Patient's symptoms include fatigue and cough  Patient denies fever, chills, congestion, rhinorrhea, sore throat, anosmia, loss of taste, shortness of breath, chest tightness, abdominal pain, nausea, vomiting, diarrhea, myalgias and headaches  Eldon Mahan has been staying home and has isolated themselves in her home  She is taking care to not share personal items and is cleaning all surfaces that are touched often, like counters, tabletops, and doorknobs using household cleaning sprays or wipes  She is wearing a mask when she leaves her room       Date of symptom onset: 1/31/2021    No results found for: Alexia Estes  Past Medical History:   Diagnosis Date    [facility]bro[facility] [address] [facility]     Chronic kidney disease     Dementia [facility] [address] [facility]     Gout     Hypertension     Osteoporosis      Past Surgical History:   Procedure Laterality Date    APPENDECTOMY      HYSTERECTOMY      TUBAL LIGATION  UTERINE FIBROID SURGERY       Current Outpatient Medications   Medication Sig Dispense Refill    alendronate (FOSAMAX) 70 mg tablet Take 1 tablet by mouth once a week 12 tablet 3    allopurinol (ZYLOPRIM) 100 mg tablet Take 1 tablet (100 mg total) by mouth daily 90 tablet 1    amLODIPine (NORVASC) 5 mg tablet Take 1 tablet (5 mg total) by mouth daily 90 tablet 3    atenolol (TENORMIN) 50 mg tablet Take 1 tablet (50 mg total) by mouth daily 30 tablet 5    atorvastatin (LIPITOR) 40 mg tablet Take 1 tablet (40 mg total) by mouth daily 90 tablet 1    clopidogrel (PLAVIX) 75 mg tablet Take 1 tablet (75 mg total) by mouth daily 90 tablet 1    donepezil (ARICEPT) 5 mg tablet Take 1 tablet (5 mg total) by mouth daily at bedtime 90 tablet 3    ergocalciferol (VITAMIN D2) 50,000 units Take 1 capsule (50,000 Units total) by mouth once a week 12 capsule 3    fluticasone (FLONASE) 50 mcg/act nasal spray Use 1 spray(s) in each nostril once daily 16 g 5    hydrochlorothiazide (HYDRODIURIL) 50 mg tablet Take 1 tablet (50 mg total) by mouth daily 90 tablet 1    lisinopril (ZESTRIL) 40 mg tablet Take 1 tablet (40 mg total) by mouth daily 90 tablet 1    loratadine (CLARITIN) 10 mg tablet Take 1 tablet (10 mg total) by mouth daily 90 tablet 0    Multiple Vitamins-Minerals (MULTI ADULT GUMMIES) CHEW Chew 1 tablet daily      OMEGA-3 FATTY ACIDS-VITAMIN E PO Take 1,000 mg by mouth      pentoxifylline (TRENtal) 400 mg ER tablet TAKE 1 TABLET BY MOUTH THREE TIMES DAILY WITH MEALS 90 tablet 1     No current facility-administered medications for this visit  Allergies   Allergen Reactions    Oxycodone-Acetaminophen Rash       Review of Systems   Constitutional: Positive for fatigue  Negative for activity change, chills, diaphoresis and fever  HENT: Negative for congestion, postnasal drip, rhinorrhea, sinus pressure, sinus pain and sore throat  No loss of taste/smell  Respiratory: Positive for cough  Negative for chest tightness, shortness of breath and wheezing  Cardiovascular: Negative for chest pain, palpitations and leg swelling  Gastrointestinal: Negative for abdominal pain, constipation, diarrhea, nausea and vomiting  Genitourinary: Negative for difficulty urinating, dysuria and frequency  Musculoskeletal: Negative for arthralgias, gait problem and myalgias  Neurological: Negative for light-headedness and headaches  Psychiatric/Behavioral: Negative for confusion  The patient is not nervous/anxious  Objective:    Vitals:       Physical Exam  Vitals signs and nursing note reviewed  Constitutional:       General: She is not in acute distress  HENT:      Head: Normocephalic and atraumatic  Pulmonary:      Effort: Pulmonary effort is normal  No respiratory distress  Neurological:      General: No focal deficit present  Mental Status: She is alert and oriented to person, place, and time  Mental status is at baseline  Psychiatric:         Mood and Affect: Mood normal          Behavior: Behavior normal          Thought Content: Thought content normal          Judgment: Judgment normal        VIRTUAL VISIT DISCLAIMER    Shagufta Velasquez acknowledges that she has consented to an online visit or consultation  She understands that the online visit is based solely on information provided by her, and that, in the absence of a face-to-face physical evaluation by the physician, the diagnosis she receives is both limited and provisional in terms of accuracy and completeness  This is not intended to replace a full medical face-to-face evaluation by the physician  Paola Velasquez understands and accepts these terms

## 2021-02-05 ENCOUNTER — TELEMEDICINE (OUTPATIENT)
Dept: INTERNAL MEDICINE CLINIC | Facility: CLINIC | Age: 81
End: 2021-02-05
Payer: COMMERCIAL

## 2021-02-05 DIAGNOSIS — U07.1 LAB TEST POSITIVE FOR DETECTION OF COVID-19 VIRUS: Primary | ICD-10-CM

## 2021-02-05 PROCEDURE — 99212 OFFICE O/P EST SF 10 MIN: CPT | Performed by: INTERNAL MEDICINE

## 2021-02-05 NOTE — PATIENT INSTRUCTIONS
COVID-19 (Coronavirus Disease 2019)   WHAT YOU NEED TO KNOW:   COVID-19 is the disease caused by the novel (new) coronavirus first discovered in December 2019  Coronaviruses generally cause upper respiratory (nose, throat, and lung) infections, such as a cold  The new virus can also cause serious lower respiratory conditions, such as pneumonia or acute respiratory distress syndrome (ARDS)  Anyone can develop serious problems from the new virus, but your risk is higher if you are 65 or older  A weak immune system, diabetes, or a heart or lung condition can also increase your risk  DISCHARGE INSTRUCTIONS:   If you think you or someone you know may be infected:  Do the following to protect others:  · If emergency care is needed,  tell the  about the possible infection, or call ahead and tell the emergency department  · Call a healthcare provider  for instructions if symptoms are mild  Anyone who may be infected should not  arrive without calling first  The provider will need to protect staff members and other patients  · The person who may be infected needs to wear a face covering  while getting medical care  This will help lower the risk of infecting others  Coverings are not used for anyone who is younger than 2 years, has breathing problems, or cannot remove it  The provider can give you instructions for anyone who cannot wear a covering  Call your local emergency number (911 in the [facility]) or go to the emergency department if:   · You have trouble breathing or shortness of breath at rest     · You have chest pain or pressure that lasts longer than 5 minutes  · You become confused or hard to wake  · Your lips or face are blue  · You have a fever of 104°F (40°C) or higher  Call your doctor if:   · You do not  have symptoms of COVID-19 but had close physical contact within 14 days with someone who tested positive  · You have questions or concerns about your condition or care      Medicines: You may need any of the following for mild symptoms:  · Decongestants  help reduce nasal congestion and help you breathe more easily  If you take decongestant pills, they may make you feel restless or cause problems with your sleep  Do not use decongestant sprays for more than a few days  · Cough suppressants  help reduce coughing  Ask your healthcare provider which type of cough medicine is best for you  · Acetaminophen  decreases pain and fever  It is available without a doctor's order  Ask how much to take and how often to take it  Follow directions  Read the labels of all other medicines you are using to see if they also contain acetaminophen, or ask your doctor or pharmacist  Acetaminophen can cause liver damage if not taken correctly  Do not use more than 4 grams (4,000 milligrams) total of acetaminophen in one day  · NSAIDs , such as ibuprofen, help decrease swelling, pain, and fever  NSAIDs can cause stomach bleeding or kidney problems in certain people  If you take blood thinner medicine, always ask your healthcare provider if NSAIDs are safe for you  Always read the medicine label and follow directions  · Take your medicine as directed  Contact your healthcare provider if you think your medicine is not helping or if you have side effects  Tell him or her if you are allergic to any medicine  Keep a list of the medicines, vitamins, and herbs you take  Include the amounts, and when and why you take them  Bring the list or the pill bottles to follow-up visits  Carry your medicine list with you in case of an emergency  How the 2019 coronavirus spreads: The virus spreads quickly and easily  You can become infected if you are in contact with a large amount of the virus, even for a short time  You can also become infected by being around a small amount of virus for a long time   The following are ways the virus is thought to spread, but more information may be coming:  · Droplets are the most common way all coronaviruses spread  The virus can travel in droplets that form when a person talks, coughs, or sneezes  Anyone who breathes in the droplets or gets them in his or her eyes can become infected with the virus  Close personal contact with an infected person is thought to be the main way the virus spreads  Close personal contact means you are within 6 feet (2 meters) of the person  · Person-to-person contact can spread the virus  For example, a person with the virus on his or her hands can spread it by shaking hands with someone  At this time, it does not appear that the virus can be passed to a baby during pregnancy or delivery  The baby can be infected after he or she is born through person-to-person contact  The virus also does not appear to spread in breast milk  If you are pregnant or breastfeeding, talk to your healthcare provider or obstetrician about any concerns you have  · The virus can stay on objects and surfaces  A person can get the virus on his or her hands by touching the object or surface  Infection happens if the person then touches his or her eyes or mouth with unwashed hands  It is not yet known how long the virus can stay on an object or surface  That is why it is important to clean all surfaces that are used regularly  · An infected animal may be able to infect a person who touches it  This may happen at live markets or on a farm  How everyone can lower the risk for COVID-19:  The best way to prevent infection is to avoid anyone who is infected, but this can be hard to do  An infected person can spread the virus before signs or symptoms begin, or even if signs or symptoms never develop  The following can help lower the risk for infection:      · Wash your hands often throughout the day  Use soap and water  Rub your soapy hands together, lacing your fingers  Wash the front and back of each hand, and in between your fingers   Use the fingers of one hand to scrub under the fingernails of the other hand  Wash for at least 20 seconds  Rinse with warm, running water for several seconds  Then dry your hands with a clean towel or paper towel  Use hand  that contains alcohol if soap and water are not available  Do not touch your eyes, nose, or mouth without washing your hands first  Teach children how to wash their hands and use hand   · Cover a sneeze or cough  This prevents droplets from traveling from you to others  Turn your face away and cover your mouth and nose with a tissue  Throw the tissue away  Use the bend of your arm if a tissue is not available  Then wash your hands well with soap and water or use hand   Turn and cover your face if you are around someone who is sneezing or coughing  Teach children how to cover a cough or sneeze  · Follow worldwide, national, and local social distancing guidelines  Social distancing means people avoid close physical contact so the virus cannot spread from one person to another  Keep at least 6 feet (2 meters) between you and others  Also keep this distance from anyone who comes to your home, such as someone making a delivery  · Make a habit of not touching your face  It is not known how long the virus can stay on objects and surfaces  If you get the virus on your hands, you can transfer it to your eyes, nose, or mouth and become infected  You can also transfer it to objects, surfaces, or people  Be aware of what you touch when you go out  Examples include handrails and elevator buttons  Try not to touch anything with bare hands unless it is necessary  Wash your hands before you leave your home and when you return  · Clean and disinfect high-touch surfaces and objects often  Use a disinfecting solution or wipes  You can make a solution by diluting 4 teaspoons of bleach in 1 quart (4 cups) of water   Clean and disinfect even if you think no one living in or coming to your home is infected with the virus  You can wipe items with a disinfecting cloth before you bring them into your home  Wash your hands after you handle anything you bring into your home  · Make your immune system as healthy as possible  A weakened immune system makes you more vulnerable to the new coronavirus  No COVID-19 vaccine is available yet  Vaccines such as the flu and pneumonia vaccines can help your immune system  Your healthcare provider can tell you which vaccines to get, and when to get them  Keep your immune system as strong as possible  Do not smoke  Eat healthy foods, exercise regularly, and try to manage stress  Go to bed and wake up at the same times each day  Social distancing guidelines:  National and local social distancing rules vary  Rules may change over time as restrictions are lifted  Restrictions may return if an outbreak happens where you live  It is important to know and follow all current social distancing rules in your area  The following are general guidelines:  · Limit trips out of your home  You may be able to have food, medicines, and other supplies delivered  If possible, have delivered items left at your door or other area  Try not to have someone hand you an item  You will be so close to the person that the virus can spread between you  · Do not have close physical contact with anyone who does not live in your home  Do not shake hands with, hug, or kiss a person as a greeting  Stand or walk as far from others as possible  If you must use public transportation (such as a bus or subway), try to sit or stand away from others  You can stay safely connected with others through phone calls, e-mail messages, social media websites, and video chats  Check in on anyone who may be having a hard time socially distancing, or who lives alone  Ask administrators at nursing homes or long-term care facilities how you can safely communicate with someone living there      · Wear a cloth face covering around others who do not live in your home  Face coverings help prevent the virus from spreading to others in droplets  You can use a clear face covering if someone needs to read your lips  This is a cloth covering that has plastic over the mouth area so your lips can be seen  Do not use coverings that have breathing valves or vents  The virus can travel out of the valve or vent and be spread to others  Do not take your covering off to talk, cough, or sneeze  Do not use coverings on children younger than 2 years or on anyone who has breathing problems or cannot remove it  · Only allow medical or other necessary professionals into your home  Wear your face covering, and remind professionals to wear a face covering  Remind them to wash their hands when they arrive and before they leave  Do not  let anyone who does not live in your home in, even if the person is not sick  A person can pass the virus to others before symptoms of COVID-19 begin  Some people never even develop symptoms  Children commonly have mild symptoms or no symptoms  It may be hard to tell a child not to hug or kiss you  Explain that this is how he or she can help you stay healthy  · Do not go to someone else's home unless it is necessary  Do not go over to visit, even if the person is lonely  Only go if you need to help him or her  Make sure you both wear face coverings while you are there  · Avoid large gatherings and crowds  Gatherings or crowds of 10 or more individuals can cause the virus to spread  Examples of gatherings include parties, sporting events, Yazidism services, and conferences  Crowds may form at beaches, [place], and tourist attractions  Protect yourself by staying away from large gatherings and crowds  · Ask your healthcare provider for other ways to have appointments  You may be able to have appointments without having to go into the provider's office  Some providers offer phone, video, or other types of appointments  You may also be able to get prescriptions for a few months of your medicines at a time  · Stay safe if you must go out to work  You may have a job that can only be done outside your home  Keep physical distance between you and other workers as much as possible  Follow your employer's rules so everyone stays safe  If you have COVID-19 and are recovering at home:  Healthcare providers will give you specific instructions to follow  The following are general guidelines to remind you how to keep others safe until you are well:  · Wash your hands often  Use soap and water as much as possible  You can use hand  that contains alcohol if soap and water are not available  Do not share towels with anyone  If you use paper towels, throw them away in a lined trash can kept in your room or area  Use a covered trash can, if possible  · Do not go out of your home unless it is necessary  You may have to go to your healthcare provider's office for check-ups or to get prescription refills  Do not arrive at the provider's office without an appointment  Providers have to make their offices safe for staff and other patients  · Do not have close physical contact with anyone unless it is necessary  Only have close physical contact with a person giving direct care, or a baby or child you must care for  Family members and friends should not visit you  If possible, stay in a separate area or room of your home if you live with others  No one should go into the area or room except to give you care  You can visit with others by phone, video chat, e-mail, or similar systems  It is important to stay connected with others in your life while you recover  · Wear a face covering while others are near you  This can help prevent droplets from spreading the virus when you talk, sneeze, or cough  Put the covering on before anyone comes into your room or area   Remind the person to cover his or her nose and mouth before going in to provide care for you  · Do not share items  Do not share dishes, towels, or other items with anyone  Items need to be washed after you use them  · Protect your baby  Wash your hands with soap and water often throughout the day  Wear a clean face covering while you breastfeed, or while you express or pump breast milk  If possible, ask someone who is well to care for your baby  You can put breast milk in bottles for the person to use, if needed  Talk to your healthcare provider if you have any questions or concerns about caring for or bonding with your baby  He or she will tell you when to bring your baby in for check-ups and vaccines  He or she will also tell you what to do if you think your baby was infected with the new virus  · Do not handle live animals  Until more is known, it is best not to touch, play with, or handle live animals  Some animals, including pets, have been infected with the new coronavirus  Do not handle or care for animals until you are well  Care includes feeding, petting, and cuddling your pet  Do not let your pet lick you or share your food  Ask someone who is not infected to take care of your pet, if possible  If you must care for a pet, wear a face covering  Wash your hands before and after you give care  · Follow directions from your healthcare provider for being around others after you recover  You will need to wait at least 10 days after symptoms first appeared  Then you will need to have no fever for 24 hours without fever medicine, and no other symptoms  A loss of taste or smell may continue for several months  It is considered okay to be around others if this is your only symptom  It is not known for sure if or for how long a recovered person can pass the virus to others  Your provider may give you instructions, such as continuing social distancing or wearing a face covering around others      How to take care of someone who has COVID-19:  If the person lives in another home, arrange for a time to give care  Remember to bring a few pairs of disposable gloves and a cloth face covering  The following are general guidelines to help you safely care for anyone who has COVID-19:  · Wash your hands often  Wash before and after you go into the person's home, area, or room  Throw paper towels away in a lined trash can that has a lid, if possible  · Do not allow others to go near the person  No one should come into the person's home unless it is necessary  If possible, the person should be in a separate area or room if he or she lives with others  Keep the room's door shut unless you need to go in or out  Have others call, video chat, or e-mail the person if he or she is feeling well enough  The person may feel lonely if he or she is kept separate for a long period of time  Safe communication can help him or her stay connected to family and friends  · Make sure the person's room has good air flow  You may be able to open the window if the weather allows  An air conditioner can also be turned on to help air move  · Contact the person before you go in to give care  Make sure the person is wearing a face covering  Remind him or her to wash his or her hands with soap and water  He or she can use hand  that contains alcohol if soap and water are not available  Put on a face covering before you go in to give care  · Wear gloves while you give care and clean  Clean items the person uses often  Clean countertops, cooking surfaces, and the fronts and insides of the microwave and refrigerator  Clean the shower, toilet, the area around the toilet, the sink, the area around the sink, and faucets  Gather used laundry or bedding  Wash and dry items on the warmest settings the fabric allows  Wash dishes and silverware in hot, soapy water or in a   · Anything you throw away needs to go into a lined trash can    When you need to empty the trash, close the open end of the lining and tie it closed  This helps prevent items the virus is on from spilling out of the trash  Remove your gloves and throw them away  Wash your hands  Follow up with your doctor as directed:  Write down your questions so you remember to ask them during your visits  For more information:   · Centers for Disease Control and Prevention  [address] , [address]  Phone: 6- 346 - [phone]  Web Address: [employer] com br    © Copyright [address] [place] [address] Drive Information is for End User's use only and may not be sold, redistributed or otherwise used for commercial purposes  All illustrations and images included in CareNotes® are the copyrighted property of A D A M [employer]  or [facility]   The above information is an  only  It is not intended as medical advice for individual conditions or treatments  Talk to your doctor, nurse or pharmacist before following any medical regimen to see if it is safe and effective for you

## 2021-02-05 NOTE — PROGRESS NOTES
COVID-19 Virtual Visit     Assessment/Plan:    Problem List Items Addressed This Visit     None      Visit Diagnoses     Lab test positive for detection of COVID-19 virus    -  Primary         Disposition:     I recommended continued isolation until at least 24 hours have passed since recovery defined as resolution of fever without the use of fever-reducing medications AND improvement in COVID symptoms AND 10 days have passed since onset of symptoms (or 10 days have passed since date of first positive viral diagnostic test for asymptomatic patients)  A/P: Day # 5 since onset of COVID s/s  Covid test now positive  Doing well and improving  Some fatigue and slight SOB, but no conversational dyspnea  Continue isolation/quarantine, increase fluids, PRN motrin/tylenol, and breathing exercises  RTC one day for f/u  I have spent 15 minutes directly with the patient  Greater than 50% of this time was spent in counseling/coordination of care regarding: diagnostic results, prognosis, risks and benefits of treatment options, instructions for management, patient and family education, importance of treatment compliance, risk factor reductions and impressions  Encounter provider Rissa Wylie DO    Provider located at [address] [place] [phone]    Recent Visits  Date Type Provider Dept   02/04/21 Telemedicine Rissa Wylie DO [facility]   02/03/21 Telemedicine DO Viktor Peralta recent visits within past 7 days and meeting all other requirements     Today's Visits  Date Type Provider Dept   02/05/21 Telemedicine DO Viktor Peralta today's visits and meeting all other requirements     Future Appointments  No visits were found meeting these conditions     Showing future appointments within next 150 days and meeting all other requirements      This virtual check-in was done via [email or web address] and patient was informed that this is not a secure, HIPAA-compliant platform  She agrees to proceed  Patient agrees to participate in a virtual check in via telephone or video visit instead of presenting to the office to address urgent/immediate medical needs  Patient is aware this is a billable service  After connecting through [facility], the patient was identified by name and date of birth  Hyacinth Lema was informed that this was a telemedicine visit and that the exam was being conducted confidentially over secure lines  My office door was closed  No one else was in the room  Hyacinth Lema acknowledged consent and understanding of privacy and security of the telemedicine visit  I informed the patient that I have reviewed her record in Epic and presented the opportunity for her to ask any questions regarding the visit today  The patient agreed to participate  Subjective: Hyacinth Lema is a 80 y o  female who has been screened for COVID-19  Symptom change since last report: improving  Patient's symptoms include fatigue and shortness of breath  Patient denies fever, chills, congestion, rhinorrhea, sore throat, anosmia, loss of taste, cough, chest tightness, abdominal pain, nausea, vomiting, diarrhea, myalgias and headaches  Nelly Mack has been staying home and has isolated themselves in her home  She is taking care to not share personal items and is cleaning all surfaces that are touched often, like counters, tabletops, and doorknobs using household cleaning sprays or wipes  She is wearing a mask when she leaves her room       Date of symptom onset: 1/31/2021  Date of positive COVID-19 PCR: 2/3/2021    Lab Results   Component Value Date    SARSCOV2 Positive (A) 02/03/2021     Past Medical History:   Diagnosis Date    [facility]bro[facility] [address] [facility]     Chronic kidney disease     Dementia ([facility] [address] [facility]     Gout     Hypertension     Osteoporosis      Past Surgical History: Procedure Laterality Date    APPENDECTOMY      HYSTERECTOMY      TUBAL LIGATION      UTERINE FIBROID SURGERY       Current Outpatient Medications   Medication Sig Dispense Refill    alendronate (FOSAMAX) 70 mg tablet Take 1 tablet by mouth once a week 12 tablet 3    allopurinol (ZYLOPRIM) 100 mg tablet Take 1 tablet (100 mg total) by mouth daily 90 tablet 1    amLODIPine (NORVASC) 5 mg tablet Take 1 tablet (5 mg total) by mouth daily 90 tablet 3    atenolol (TENORMIN) 50 mg tablet Take 1 tablet (50 mg total) by mouth daily 30 tablet 5    atorvastatin (LIPITOR) 40 mg tablet Take 1 tablet (40 mg total) by mouth daily 90 tablet 1    clopidogrel (PLAVIX) 75 mg tablet Take 1 tablet (75 mg total) by mouth daily 90 tablet 1    donepezil (ARICEPT) 5 mg tablet Take 1 tablet (5 mg total) by mouth daily at bedtime 90 tablet 3    ergocalciferol (VITAMIN D2) 50,000 units Take 1 capsule (50,000 Units total) by mouth once a week 12 capsule 3    fluticasone (FLONASE) 50 mcg/act nasal spray Use 1 spray(s) in each nostril once daily 16 g 5    hydrochlorothiazide (HYDRODIURIL) 50 mg tablet Take 1 tablet (50 mg total) by mouth daily 90 tablet 1    lisinopril (ZESTRIL) 40 mg tablet Take 1 tablet (40 mg total) by mouth daily 90 tablet 1    loratadine (CLARITIN) 10 mg tablet Take 1 tablet (10 mg total) by mouth daily 90 tablet 0    Multiple Vitamins-Minerals (MULTI ADULT GUMMIES) CHEW Chew 1 tablet daily      OMEGA-3 FATTY ACIDS-VITAMIN E PO Take 1,000 mg by mouth      pentoxifylline (TRENtal) 400 mg ER tablet TAKE 1 TABLET BY MOUTH THREE TIMES DAILY WITH MEALS 90 tablet 1     No current facility-administered medications for this visit  Allergies   Allergen Reactions    Oxycodone-Acetaminophen Rash       Review of Systems   Constitutional: Positive for activity change and fatigue  Negative for chills, diaphoresis and fever     HENT: Negative for congestion, postnasal drip, rhinorrhea, sinus pressure, sinus pain and sore throat  No loss of taste/smell  Respiratory: Positive for shortness of breath  Negative for cough, chest tightness and wheezing  Cardiovascular: Negative for chest pain, palpitations and leg swelling  Gastrointestinal: Negative for abdominal pain, constipation, diarrhea, nausea and vomiting  Genitourinary: Negative for difficulty urinating, dysuria and frequency  Musculoskeletal: Negative for arthralgias, gait problem and myalgias  Neurological: Negative for light-headedness and headaches  Psychiatric/Behavioral: Negative for confusion  The patient is not nervous/anxious  Objective:    Vitals:       Physical Exam  Vitals signs and nursing note reviewed  Constitutional:       General: She is not in acute distress  Appearance: Normal appearance  She is not ill-appearing  HENT:      Head: Normocephalic and atraumatic  Eyes:      Extraocular Movements: Extraocular movements intact  Conjunctiva/sclera: Conjunctivae normal       Pupils: Pupils are equal, round, and reactive to light  Pulmonary:      Effort: Pulmonary effort is normal  No respiratory distress  Neurological:      General: No focal deficit present  Mental Status: She is alert and oriented to person, place, and time  Mental status is at baseline  Psychiatric:         Mood and Affect: Mood normal          Behavior: Behavior normal          Thought Content: Thought content normal          Judgment: Judgment normal        VIRTUAL VISIT DISCLAIMER    Shaguftaglendy Velasquez acknowledges that she has consented to an online visit or consultation  She understands that the online visit is based solely on information provided by her, and that, in the absence of a face-to-face physical evaluation by the physician, the diagnosis she receives is both limited and provisional in terms of accuracy and completeness  This is not intended to replace a full medical face-to-face evaluation by the physician   [address] [place] [address] understands and accepts these terms

## 2021-02-06 ENCOUNTER — TELEMEDICINE (OUTPATIENT)
Dept: INTERNAL MEDICINE CLINIC | Facility: CLINIC | Age: 81
End: 2021-02-06
Payer: COMMERCIAL

## 2021-02-06 DIAGNOSIS — U07.1 LAB TEST POSITIVE FOR DETECTION OF COVID-19 VIRUS: Primary | ICD-10-CM

## 2021-02-06 PROCEDURE — 99213 OFFICE O/P EST LOW 20 MIN: CPT | Performed by: INTERNAL MEDICINE

## 2021-02-06 NOTE — PATIENT INSTRUCTIONS
COVID-19 (Coronavirus Disease 2019)   WHAT YOU NEED TO KNOW:   COVID-19 is the disease caused by the novel (new) coronavirus first discovered in December 2019  Coronaviruses generally cause upper respiratory (nose, throat, and lung) infections, such as a cold  The new virus can also cause serious lower respiratory conditions, such as pneumonia or acute respiratory distress syndrome (ARDS)  Anyone can develop serious problems from the new virus, but your risk is higher if you are 65 or older  A weak immune system, diabetes, or a heart or lung condition can also increase your risk  DISCHARGE INSTRUCTIONS:   If you think you or someone you know may be infected:  Do the following to protect others:  · If emergency care is needed,  tell the  about the possible infection, or call ahead and tell the emergency department  · Call a healthcare provider  for instructions if symptoms are mild  Anyone who may be infected should not  arrive without calling first  The provider will need to protect staff members and other patients  · The person who may be infected needs to wear a face covering  while getting medical care  This will help lower the risk of infecting others  Coverings are not used for anyone who is younger than 2 years, has breathing problems, or cannot remove it  The provider can give you instructions for anyone who cannot wear a covering  Call your local emergency number (911 in the [address], located within [facility] [facility]) or go to the emergency department if:   · You have trouble breathing or shortness of breath at rest     · You have chest pain or pressure that lasts longer than 5 minutes  · You become confused or hard to wake  · Your lips or face are blue  · You have a fever of 104°F (40°C) or higher  Call your doctor if:   · You do not  have symptoms of COVID-19 but had close physical contact within 14 days with someone who tested positive  · You have questions or concerns about your condition or care      Medicines: You may need any of the following for mild symptoms:  · Decongestants  help reduce nasal congestion and help you breathe more easily  If you take decongestant pills, they may make you feel restless or cause problems with your sleep  Do not use decongestant sprays for more than a few days  · Cough suppressants  help reduce coughing  Ask your healthcare provider which type of cough medicine is best for you  · Acetaminophen  decreases pain and fever  It is available without a doctor's order  Ask how much to take and how often to take it  Follow directions  Read the labels of all other medicines you are using to see if they also contain acetaminophen, or ask your doctor or pharmacist  Acetaminophen can cause liver damage if not taken correctly  Do not use more than 4 grams (4,000 milligrams) total of acetaminophen in one day  · NSAIDs , such as ibuprofen, help decrease swelling, pain, and fever  NSAIDs can cause stomach bleeding or kidney problems in certain people  If you take blood thinner medicine, always ask your healthcare provider if NSAIDs are safe for you  Always read the medicine label and follow directions  · Take your medicine as directed  Contact your healthcare provider if you think your medicine is not helping or if you have side effects  Tell him or her if you are allergic to any medicine  Keep a list of the medicines, vitamins, and herbs you take  Include the amounts, and when and why you take them  Bring the list or the pill bottles to follow-up visits  Carry your medicine list with you in case of an emergency  How the 2019 coronavirus spreads: The virus spreads quickly and easily  You can become infected if you are in contact with a large amount of the virus, even for a short time  You can also become infected by being around a small amount of virus for a long time   The following are ways the virus is thought to spread, but more information may be coming:  · Droplets are the most common way all coronaviruses spread  The virus can travel in droplets that form when a person talks, coughs, or sneezes  Anyone who breathes in the droplets or gets them in his or her eyes can become infected with the virus  Close personal contact with an infected person is thought to be the main way the virus spreads  Close personal contact means you are within 6 feet (2 meters) of the person  · Person-to-person contact can spread the virus  For example, a person with the virus on his or her hands can spread it by shaking hands with someone  At this time, it does not appear that the virus can be passed to a baby during pregnancy or delivery  The baby can be infected after he or she is born through person-to-person contact  The virus also does not appear to spread in breast milk  If you are pregnant or breastfeeding, talk to your healthcare provider or obstetrician about any concerns you have  · The virus can stay on objects and surfaces  A person can get the virus on his or her hands by touching the object or surface  Infection happens if the person then touches his or her eyes or mouth with unwashed hands  It is not yet known how long the virus can stay on an object or surface  That is why it is important to clean all surfaces that are used regularly  · An infected animal may be able to infect a person who touches it  This may happen at live markets or on a farm  How everyone can lower the risk for COVID-19:  The best way to prevent infection is to avoid anyone who is infected, but this can be hard to do  An infected person can spread the virus before signs or symptoms begin, or even if signs or symptoms never develop  The following can help lower the risk for infection:      · Wash your hands often throughout the day  Use soap and water  Rub your soapy hands together, lacing your fingers  Wash the front and back of each hand, and in between your fingers   Use the fingers of one hand to scrub under the fingernails of the other hand  Wash for at least 20 seconds  Rinse with warm, running water for several seconds  Then dry your hands with a clean towel or paper towel  Use hand  that contains alcohol if soap and water are not available  Do not touch your eyes, nose, or mouth without washing your hands first  Teach children how to wash their hands and use hand   · Cover a sneeze or cough  This prevents droplets from traveling from you to others  Turn your face away and cover your mouth and nose with a tissue  Throw the tissue away  Use the bend of your arm if a tissue is not available  Then wash your hands well with soap and water or use hand   Turn and cover your face if you are around someone who is sneezing or coughing  Teach children how to cover a cough or sneeze  · Follow worldwide, national, and local social distancing guidelines  Social distancing means people avoid close physical contact so the virus cannot spread from one person to another  Keep at least 6 feet (2 meters) between you and others  Also keep this distance from anyone who comes to your home, such as someone making a delivery  · Make a habit of not touching your face  It is not known how long the virus can stay on objects and surfaces  If you get the virus on your hands, you can transfer it to your eyes, nose, or mouth and become infected  You can also transfer it to objects, surfaces, or people  Be aware of what you touch when you go out  Examples include handrails and elevator buttons  Try not to touch anything with bare hands unless it is necessary  Wash your hands before you leave your home and when you return  · Clean and disinfect high-touch surfaces and objects often  Use a disinfecting solution or wipes  You can make a solution by diluting 4 teaspoons of bleach in 1 quart (4 cups) of water   Clean and disinfect even if you think no one living in or coming to your home is infected with the virus  You can wipe items with a disinfecting cloth before you bring them into your home  Wash your hands after you handle anything you bring into your home  · Make your immune system as healthy as possible  A weakened immune system makes you more vulnerable to the new coronavirus  No COVID-19 vaccine is available yet  Vaccines such as the flu and pneumonia vaccines can help your immune system  Your healthcare provider can tell you which vaccines to get, and when to get them  Keep your immune system as strong as possible  Do not smoke  Eat healthy foods, exercise regularly, and try to manage stress  Go to bed and wake up at the same times each day  Social distancing guidelines:  National and local social distancing rules vary  Rules may change over time as restrictions are lifted  Restrictions may return if an outbreak happens where you live  It is important to know and follow all current social distancing rules in your area  The following are general guidelines:  · Limit trips out of your home  You may be able to have food, medicines, and other supplies delivered  If possible, have delivered items left at your door or other area  Try not to have someone hand you an item  You will be so close to the person that the virus can spread between you  · Do not have close physical contact with anyone who does not live in your home  Do not shake hands with, hug, or kiss a person as a greeting  Stand or walk as far from others as possible  If you must use public transportation (such as a bus or subway), try to sit or stand away from others  You can stay safely connected with others through phone calls, e-mail messages, social media websites, and video chats  Check in on anyone who may be having a hard time socially distancing, or who lives alone  Ask administrators at nursing homes or long-term care facilities how you can safely communicate with someone living there      · Wear a cloth face covering around others who do not live in your home  Face coverings help prevent the virus from spreading to others in droplets  You can use a clear face covering if someone needs to read your lips  This is a cloth covering that has plastic over the mouth area so your lips can be seen  Do not use coverings that have breathing valves or vents  The virus can travel out of the valve or vent and be spread to others  Do not take your covering off to talk, cough, or sneeze  Do not use coverings on children younger than 2 years or on anyone who has breathing problems or cannot remove it  · Only allow medical or other necessary professionals into your home  Wear your face covering, and remind professionals to wear a face covering  Remind them to wash their hands when they arrive and before they leave  Do not  let anyone who does not live in your home in, even if the person is not sick  A person can pass the virus to others before symptoms of COVID-19 begin  Some people never even develop symptoms  Children commonly have mild symptoms or no symptoms  It may be hard to tell a child not to hug or kiss you  Explain that this is how he or she can help you stay healthy  · Do not go to someone else's home unless it is necessary  Do not go over to visit, even if the person is lonely  Only go if you need to help him or her  Make sure you both wear face coverings while you are there  · Avoid large gatherings and crowds  Gatherings or crowds of 10 or more individuals can cause the virus to spread  Examples of gatherings include parties, sporting events, Temple services, and conferences  Crowds may form at beaches, [place], and tourist attractions  Protect yourself by staying away from large gatherings and crowds  · Ask your healthcare provider for other ways to have appointments  You may be able to have appointments without having to go into the provider's office  Some providers offer phone, video, or other types of appointments  You may also be able to get prescriptions for a few months of your medicines at a time  · Stay safe if you must go out to work  You may have a job that can only be done outside your home  Keep physical distance between you and other workers as much as possible  Follow your employer's rules so everyone stays safe  If you have COVID-19 and are recovering at home:  Healthcare providers will give you specific instructions to follow  The following are general guidelines to remind you how to keep others safe until you are well:  · Wash your hands often  Use soap and water as much as possible  You can use hand  that contains alcohol if soap and water are not available  Do not share towels with anyone  If you use paper towels, throw them away in a lined trash can kept in your room or area  Use a covered trash can, if possible  · Do not go out of your home unless it is necessary  You may have to go to your healthcare provider's office for check-ups or to get prescription refills  Do not arrive at the provider's office without an appointment  Providers have to make their offices safe for staff and other patients  · Do not have close physical contact with anyone unless it is necessary  Only have close physical contact with a person giving direct care, or a baby or child you must care for  Family members and friends should not visit you  If possible, stay in a separate area or room of your home if you live with others  No one should go into the area or room except to give you care  You can visit with others by phone, video chat, e-mail, or similar systems  It is important to stay connected with others in your life while you recover  · Wear a face covering while others are near you  This can help prevent droplets from spreading the virus when you talk, sneeze, or cough  Put the covering on before anyone comes into your room or area   Remind the person to cover his or her nose and mouth before going in to provide care for you  · Do not share items  Do not share dishes, towels, or other items with anyone  Items need to be washed after you use them  · Protect your baby  Wash your hands with soap and water often throughout the day  Wear a clean face covering while you breastfeed, or while you express or pump breast milk  If possible, ask someone who is well to care for your baby  You can put breast milk in bottles for the person to use, if needed  Talk to your healthcare provider if you have any questions or concerns about caring for or bonding with your baby  He or she will tell you when to bring your baby in for check-ups and vaccines  He or she will also tell you what to do if you think your baby was infected with the new virus  · Do not handle live animals  Until more is known, it is best not to touch, play with, or handle live animals  Some animals, including pets, have been infected with the new coronavirus  Do not handle or care for animals until you are well  Care includes feeding, petting, and cuddling your pet  Do not let your pet lick you or share your food  Ask someone who is not infected to take care of your pet, if possible  If you must care for a pet, wear a face covering  Wash your hands before and after you give care  · Follow directions from your healthcare provider for being around others after you recover  You will need to wait at least 10 days after symptoms first appeared  Then you will need to have no fever for 24 hours without fever medicine, and no other symptoms  A loss of taste or smell may continue for several months  It is considered okay to be around others if this is your only symptom  It is not known for sure if or for how long a recovered person can pass the virus to others  Your provider may give you instructions, such as continuing social distancing or wearing a face covering around others      How to take care of someone who has COVID-19:  If the person lives in another home, arrange for a time to give care  Remember to bring a few pairs of disposable gloves and a cloth face covering  The following are general guidelines to help you safely care for anyone who has COVID-19:  · Wash your hands often  Wash before and after you go into the person's home, area, or room  Throw paper towels away in a lined trash can that has a lid, if possible  · Do not allow others to go near the person  No one should come into the person's home unless it is necessary  If possible, the person should be in a separate area or room if he or she lives with others  Keep the room's door shut unless you need to go in or out  Have others call, video chat, or e-mail the person if he or she is feeling well enough  The person may feel lonely if he or she is kept separate for a long period of time  Safe communication can help him or her stay connected to family and friends  · Make sure the person's room has good air flow  You may be able to open the window if the weather allows  An air conditioner can also be turned on to help air move  · Contact the person before you go in to give care  Make sure the person is wearing a face covering  Remind him or her to wash his or her hands with soap and water  He or she can use hand  that contains alcohol if soap and water are not available  Put on a face covering before you go in to give care  · Wear gloves while you give care and clean  Clean items the person uses often  Clean countertops, cooking surfaces, and the fronts and insides of the microwave and refrigerator  Clean the shower, toilet, the area around the toilet, the sink, the area around the sink, and faucets  Gather used laundry or bedding  Wash and dry items on the warmest settings the fabric allows  Wash dishes and silverware in hot, soapy water or in a   · Anything you throw away needs to go into a lined trash can    When you need to empty the trash, close the open end of the lining and tie it closed  This helps prevent items the virus is on from spilling out of the trash  Remove your gloves and throw them away  Wash your hands  Follow up with your doctor as directed:  Write down your questions so you remember to ask them during your visits  For more information:   · Centers for Disease Control and Prevention  [address] , [address]  Phone: 8- 814 - 566-4257  Web Address: [employer] com br    © Copyright [address] Information is for End User's use only and may not be sold, redistributed or otherwise used for commercial purposes  All illustrations and images included in CareNotes® are the copyrighted property of [employer] M [employer]  or [facility] [employer] Thomas   The above information is an  only  It is not intended as medical advice for individual conditions or treatments  Talk to your doctor, nurse or pharmacist before following any medical regimen to see if it is safe and effective for you

## 2021-02-06 NOTE — PROGRESS NOTES
COVID-19 Virtual Visit     Assessment/Plan:    Problem List Items Addressed This Visit     None      Visit Diagnoses     Lab test positive for detection of COVID-19 virus    -  Primary         Disposition:     I recommended continued isolation until at least 24 hours have passed since recovery defined as resolution of fever without the use of fever-reducing medications AND improvement in COVID symptoms AND 10 days have passed since onset of symptoms (or 10 days have passed since date of first positive viral diagnostic test for asymptomatic patients)  A/P: Day # 6 since onset of COVID s/s  Covid test was positive  Doing well and no real s/s  Some SOB, but similar to her chronic problem  Discussed monoclonal infusion, but is already day 6 and minimal s/s  Defer at this time    Continue isolation/quarantine, increase fluids, PRN motrin/tylenol, and breathing exercises  RTC one day for f/u  I have spent 15 minutes directly with the patient  Greater than 50% of this time was spent in counseling/coordination of care regarding: prognosis, risks and benefits of treatment options, instructions for management, patient and family education, importance of treatment compliance, risk factor reductions and impressions  Encounter provider Patricia Astudillo DO    Provider located at [address] [place] [id number]    Recent Visits  Date Type Provider Dept   02/05/21 Telemedicine Patricia Astudillo DO [facility]   02/04/21 [facility], DO [facility]   02/03/21 Telemedicine Patricia Astudillo DO [facility]   Showing recent visits within past 7 days and meeting all other requirements     Today's Visits  Date Type Provider Dept   02/06/21 Telemedicine DO Viktor Villagran today's visits and meeting all other requirements     Future Appointments  No visits were found meeting these conditions  Showing future appointments within next 150 days and meeting all other requirements      This virtual check-in was done via [employer] and patient was informed that this is a secure, HIPAA-compliant platform  She agrees to proceed  Patient agrees to participate in a virtual check in via telephone or video visit instead of presenting to the office to address urgent/immediate medical needs  Patient is aware this is a billable service  After connecting through [facility], the patient was identified by name and date of birth  Duran Keene was informed that this was a telemedicine visit and that the exam was being conducted confidentially over secure lines  My office door was closed  No one else was in the room  Duran Keene acknowledged consent and understanding of privacy and security of the telemedicine visit  I informed the patient that I have reviewed her record in Epic and presented the opportunity for her to ask any questions regarding the visit today  The patient agreed to participate  Subjective: Duran Keene is a 80 y o  female who has been screened for COVID-19  Symptom change since last report: improving  Patient's symptoms include shortness of breath  Patient denies fever, chills, fatigue, congestion, rhinorrhea, sore throat, anosmia, loss of taste, cough, chest tightness, abdominal pain, nausea, vomiting, diarrhea, myalgias and headaches  Yola Jacob has been staying home and has isolated themselves in her home  She is taking care to not share personal items and is cleaning all surfaces that are touched often, like counters, tabletops, and doorknobs using household cleaning sprays or wipes  She is wearing a mask when she leaves her room       Date of symptom onset: 1/31/2021  Date of positive COVID-19 PCR: 2/3/2021    Lab Results   Component Value Date    SARSCOV2 Positive (A) 02/03/2021     Past Medical History:   Diagnosis Date    [facility] [address] [facility]     Chronic kidney disease     Dementia ([facility] [place] [address] [facility]     Gout     Hypertension     Osteoporosis      Past Surgical History:   Procedure Laterality Date    APPENDECTOMY      HYSTERECTOMY      TUBAL LIGATION      UTERINE FIBROID SURGERY       Current Outpatient Medications   Medication Sig Dispense Refill    alendronate (FOSAMAX) 70 mg tablet Take 1 tablet by mouth once a week 12 tablet 3    allopurinol (ZYLOPRIM) 100 mg tablet Take 1 tablet (100 mg total) by mouth daily 90 tablet 1    amLODIPine (NORVASC) 5 mg tablet Take 1 tablet (5 mg total) by mouth daily 90 tablet 3    atenolol (TENORMIN) 50 mg tablet Take 1 tablet (50 mg total) by mouth daily 30 tablet 5    atorvastatin (LIPITOR) 40 mg tablet Take 1 tablet (40 mg total) by mouth daily 90 tablet 1    clopidogrel (PLAVIX) 75 mg tablet Take 1 tablet (75 mg total) by mouth daily 90 tablet 1    donepezil (ARICEPT) 5 mg tablet Take 1 tablet (5 mg total) by mouth daily at bedtime 90 tablet 3    ergocalciferol (VITAMIN D2) 50,000 units Take 1 capsule (50,000 Units total) by mouth once a week 12 capsule 3    fluticasone (FLONASE) 50 mcg/act nasal spray Use 1 spray(s) in each nostril once daily 16 g 5    hydrochlorothiazide (HYDRODIURIL) 50 mg tablet Take 1 tablet (50 mg total) by mouth daily 90 tablet 1    lisinopril (ZESTRIL) 40 mg tablet Take 1 tablet (40 mg total) by mouth daily 90 tablet 1    loratadine (CLARITIN) 10 mg tablet Take 1 tablet (10 mg total) by mouth daily 90 tablet 0    Multiple Vitamins-Minerals (MULTI ADULT GUMMIES) CHEW Chew 1 tablet daily      OMEGA-3 FATTY ACIDS-VITAMIN E PO Take 1,000 mg by mouth      pentoxifylline (TRENtal) 400 mg ER tablet TAKE 1 TABLET BY MOUTH THREE TIMES DAILY WITH MEALS 90 tablet 1     No current facility-administered medications for this visit        Allergies   Allergen Reactions    Oxycodone-Acetaminophen Rash       Review of Systems   Constitutional: Negative for activity change, chills, diaphoresis, fatigue and fever    HENT: Negative for congestion, postnasal drip, rhinorrhea, sinus pressure, sinus pain and sore throat  No loss of taste/smell  Respiratory: Positive for shortness of breath  Negative for cough, chest tightness and wheezing  Cardiovascular: Negative for chest pain, palpitations and leg swelling  Gastrointestinal: Negative for abdominal pain, constipation, diarrhea, nausea and vomiting  Genitourinary: Negative for difficulty urinating, dysuria and frequency  Musculoskeletal: Negative for arthralgias, gait problem and myalgias  Neurological: Negative for light-headedness and headaches  Psychiatric/Behavioral: Negative for confusion  The patient is not nervous/anxious  Objective:    Vitals:       Physical Exam  Vitals signs and nursing note reviewed  Constitutional:       General: She is not in acute distress  Appearance: Normal appearance  She is not ill-appearing  HENT:      Head: Normocephalic and atraumatic  Eyes:      Extraocular Movements: Extraocular movements intact  Conjunctiva/sclera: Conjunctivae normal       Pupils: Pupils are equal, round, and reactive to light  Pulmonary:      Effort: Pulmonary effort is normal  No respiratory distress  Neurological:      General: No focal deficit present  Mental Status: She is alert and oriented to person, place, and time  Mental status is at baseline  Psychiatric:         Mood and Affect: Mood normal          Behavior: Behavior normal          Thought Content: Thought content normal          Judgment: Judgment normal        VIRTUAL VISIT DISCLAIMER    Shagufta Velasquez acknowledges that she has consented to an online visit or consultation  She understands that the online visit is based solely on information provided by her, and that, in the absence of a face-to-face physical evaluation by the physician, the diagnosis she receives is both limited and provisional in terms of accuracy and completeness   This is not intended to replace a full medical face-to-face evaluation by the physician  Simona Velasquez understands and accepts these terms

## 2021-02-07 ENCOUNTER — TELEMEDICINE (OUTPATIENT)
Dept: INTERNAL MEDICINE CLINIC | Facility: CLINIC | Age: 81
End: 2021-02-07
Payer: COMMERCIAL

## 2021-02-07 DIAGNOSIS — U07.1 LAB TEST POSITIVE FOR DETECTION OF COVID-19 VIRUS: Primary | ICD-10-CM

## 2021-02-07 PROCEDURE — 99212 OFFICE O/P EST SF 10 MIN: CPT | Performed by: INTERNAL MEDICINE

## 2021-02-07 NOTE — PATIENT INSTRUCTIONS
COVID-19 (Coronavirus Disease 2019)   WHAT YOU NEED TO KNOW:   COVID-19 is the disease caused by the novel (new) coronavirus first discovered in December 2019  Coronaviruses generally cause upper respiratory (nose, throat, and lung) infections, such as a cold  The new virus can also cause serious lower respiratory conditions, such as pneumonia or acute respiratory distress syndrome (ARDS)  Anyone can develop serious problems from the new virus, but your risk is higher if you are 65 or older  A weak immune system, diabetes, or a heart or lung condition can also increase your risk  DISCHARGE INSTRUCTIONS:   If you think you or someone you know may be infected:  Do the following to protect others:  · If emergency care is needed,  tell the  about the possible infection, or call ahead and tell the emergency department  · Call a healthcare provider  for instructions if symptoms are mild  Anyone who may be infected should not  arrive without calling first  The provider will need to protect staff members and other patients  · The person who may be infected needs to wear a face covering  while getting medical care  This will help lower the risk of infecting others  Coverings are not used for anyone who is younger than 2 years, has breathing problems, or cannot remove it  The provider can give you instructions for anyone who cannot wear a covering  Call your local emergency number (911 in the [facility]) or go to the emergency department if:   · You have trouble breathing or shortness of breath at rest     · You have chest pain or pressure that lasts longer than 5 minutes  · You become confused or hard to wake  · Your lips or face are blue  · You have a fever of 104°F (40°C) or higher  Call your doctor if:   · You do not  have symptoms of COVID-19 but had close physical contact within 14 days with someone who tested positive  · You have questions or concerns about your condition or care      Medicines: You may need any of the following for mild symptoms:  · Decongestants  help reduce nasal congestion and help you breathe more easily  If you take decongestant pills, they may make you feel restless or cause problems with your sleep  Do not use decongestant sprays for more than a few days  · Cough suppressants  help reduce coughing  Ask your healthcare provider which type of cough medicine is best for you  · Acetaminophen  decreases pain and fever  It is available without a doctor's order  Ask how much to take and how often to take it  Follow directions  Read the labels of all other medicines you are using to see if they also contain acetaminophen, or ask your doctor or pharmacist  Acetaminophen can cause liver damage if not taken correctly  Do not use more than 4 grams (4,000 milligrams) total of acetaminophen in one day  · NSAIDs , such as ibuprofen, help decrease swelling, pain, and fever  NSAIDs can cause stomach bleeding or kidney problems in certain people  If you take blood thinner medicine, always ask your healthcare provider if NSAIDs are safe for you  Always read the medicine label and follow directions  · Take your medicine as directed  Contact your healthcare provider if you think your medicine is not helping or if you have side effects  Tell him or her if you are allergic to any medicine  Keep a list of the medicines, vitamins, and herbs you take  Include the amounts, and when and why you take them  Bring the list or the pill bottles to follow-up visits  Carry your medicine list with you in case of an emergency  How the 2019 coronavirus spreads: The virus spreads quickly and easily  You can become infected if you are in contact with a large amount of the virus, even for a short time  You can also become infected by being around a small amount of virus for a long time   The following are ways the virus is thought to spread, but more information may be coming:  · Droplets are the most common way all coronaviruses spread  The virus can travel in droplets that form when a person talks, coughs, or sneezes  Anyone who breathes in the droplets or gets them in his or her eyes can become infected with the virus  Close personal contact with an infected person is thought to be the main way the virus spreads  Close personal contact means you are within 6 feet (2 meters) of the person  · Person-to-person contact can spread the virus  For example, a person with the virus on his or her hands can spread it by shaking hands with someone  At this time, it does not appear that the virus can be passed to a baby during pregnancy or delivery  The baby can be infected after he or she is born through person-to-person contact  The virus also does not appear to spread in breast milk  If you are pregnant or breastfeeding, talk to your healthcare provider or obstetrician about any concerns you have  · The virus can stay on objects and surfaces  A person can get the virus on his or her hands by touching the object or surface  Infection happens if the person then touches his or her eyes or mouth with unwashed hands  It is not yet known how long the virus can stay on an object or surface  That is why it is important to clean all surfaces that are used regularly  · An infected animal may be able to infect a person who touches it  This may happen at live markets or on a farm  How everyone can lower the risk for COVID-19:  The best way to prevent infection is to avoid anyone who is infected, but this can be hard to do  An infected person can spread the virus before signs or symptoms begin, or even if signs or symptoms never develop  The following can help lower the risk for infection:      · Wash your hands often throughout the day  Use soap and water  Rub your soapy hands together, lacing your fingers  Wash the front and back of each hand, and in between your fingers   Use the fingers of one hand to scrub under the fingernails of the other hand  Wash for at least 20 seconds  Rinse with warm, running water for several seconds  Then dry your hands with a clean towel or paper towel  Use hand  that contains alcohol if soap and water are not available  Do not touch your eyes, nose, or mouth without washing your hands first  Teach children how to wash their hands and use hand   · Cover a sneeze or cough  This prevents droplets from traveling from you to others  Turn your face away and cover your mouth and nose with a tissue  Throw the tissue away  Use the bend of your arm if a tissue is not available  Then wash your hands well with soap and water or use hand   Turn and cover your face if you are around someone who is sneezing or coughing  Teach children how to cover a cough or sneeze  · Follow worldwide, national, and local social distancing guidelines  Social distancing means people avoid close physical contact so the virus cannot spread from one person to another  Keep at least 6 feet (2 meters) between you and others  Also keep this distance from anyone who comes to your home, such as someone making a delivery  · Make a habit of not touching your face  It is not known how long the virus can stay on objects and surfaces  If you get the virus on your hands, you can transfer it to your eyes, nose, or mouth and become infected  You can also transfer it to objects, surfaces, or people  Be aware of what you touch when you go out  Examples include handrails and elevator buttons  Try not to touch anything with bare hands unless it is necessary  Wash your hands before you leave your home and when you return  · Clean and disinfect high-touch surfaces and objects often  Use a disinfecting solution or wipes  You can make a solution by diluting 4 teaspoons of bleach in 1 quart (4 cups) of water   Clean and disinfect even if you think no one living in or coming to your home is infected with the virus  You can wipe items with a disinfecting cloth before you bring them into your home  Wash your hands after you handle anything you bring into your home  · Make your immune system as healthy as possible  A weakened immune system makes you more vulnerable to the new coronavirus  No COVID-19 vaccine is available yet  Vaccines such as the flu and pneumonia vaccines can help your immune system  Your healthcare provider can tell you which vaccines to get, and when to get them  Keep your immune system as strong as possible  Do not smoke  Eat healthy foods, exercise regularly, and try to manage stress  Go to bed and wake up at the same times each day  Social distancing guidelines:  National and local social distancing rules vary  Rules may change over time as restrictions are lifted  Restrictions may return if an outbreak happens where you live  It is important to know and follow all current social distancing rules in your area  The following are general guidelines:  · Limit trips out of your home  You may be able to have food, medicines, and other supplies delivered  If possible, have delivered items left at your door or other area  Try not to have someone hand you an item  You will be so close to the person that the virus can spread between you  · Do not have close physical contact with anyone who does not live in your home  Do not shake hands with, hug, or kiss a person as a greeting  Stand or walk as far from others as possible  If you must use public transportation (such as a bus or subway), try to sit or stand away from others  You can stay safely connected with others through phone calls, e-mail messages, social media websites, and video chats  Check in on anyone who may be having a hard time socially distancing, or who lives alone  Ask administrators at nursing homes or long-term care facilities how you can safely communicate with someone living there      · Wear a cloth face covering around others who do not live in your home  Face coverings help prevent the virus from spreading to others in droplets  You can use a clear face covering if someone needs to read your lips  This is a cloth covering that has plastic over the mouth area so your lips can be seen  Do not use coverings that have breathing valves or vents  The virus can travel out of the valve or vent and be spread to others  Do not take your covering off to talk, cough, or sneeze  Do not use coverings on children younger than 2 years or on anyone who has breathing problems or cannot remove it  · Only allow medical or other necessary professionals into your home  Wear your face covering, and remind professionals to wear a face covering  Remind them to wash their hands when they arrive and before they leave  Do not  let anyone who does not live in your home in, even if the person is not sick  A person can pass the virus to others before symptoms of COVID-19 begin  Some people never even develop symptoms  Children commonly have mild symptoms or no symptoms  It may be hard to tell a child not to hug or kiss you  Explain that this is how he or she can help you stay healthy  · Do not go to someone else's home unless it is necessary  Do not go over to visit, even if the person is lonely  Only go if you need to help him or her  Make sure you both wear face coverings while you are there  · Avoid large gatherings and crowds  Gatherings or crowds of 10 or more individuals can cause the virus to spread  Examples of gatherings include parties, sporting events, Christian services, and conferences  Crowds may form at beaches, [place], and tourist attractions  Protect yourself by staying away from large gatherings and crowds  · Ask your healthcare provider for other ways to have appointments  You may be able to have appointments without having to go into the provider's office  Some providers offer phone, video, or other types of appointments  You may also be able to get prescriptions for a few months of your medicines at a time  · Stay safe if you must go out to work  You may have a job that can only be done outside your home  Keep physical distance between you and other workers as much as possible  Follow your employer's rules so everyone stays safe  If you have COVID-19 and are recovering at home:  Healthcare providers will give you specific instructions to follow  The following are general guidelines to remind you how to keep others safe until you are well:  · Wash your hands often  Use soap and water as much as possible  You can use hand  that contains alcohol if soap and water are not available  Do not share towels with anyone  If you use paper towels, throw them away in a lined trash can kept in your room or area  Use a covered trash can, if possible  · Do not go out of your home unless it is necessary  You may have to go to your healthcare provider's office for check-ups or to get prescription refills  Do not arrive at the provider's office without an appointment  Providers have to make their offices safe for staff and other patients  · Do not have close physical contact with anyone unless it is necessary  Only have close physical contact with a person giving direct care, or a baby or child you must care for  Family members and friends should not visit you  If possible, stay in a separate area or room of your home if you live with others  No one should go into the area or room except to give you care  You can visit with others by phone, video chat, e-mail, or similar systems  It is important to stay connected with others in your life while you recover  · Wear a face covering while others are near you  This can help prevent droplets from spreading the virus when you talk, sneeze, or cough  Put the covering on before anyone comes into your room or area   Remind the person to cover his or her nose and mouth before going in to provide care for you  · Do not share items  Do not share dishes, towels, or other items with anyone  Items need to be washed after you use them  · Protect your baby  Wash your hands with soap and water often throughout the day  Wear a clean face covering while you breastfeed, or while you express or pump breast milk  If possible, ask someone who is well to care for your baby  You can put breast milk in bottles for the person to use, if needed  Talk to your healthcare provider if you have any questions or concerns about caring for or bonding with your baby  He or she will tell you when to bring your baby in for check-ups and vaccines  He or she will also tell you what to do if you think your baby was infected with the new virus  · Do not handle live animals  Until more is known, it is best not to touch, play with, or handle live animals  Some animals, including pets, have been infected with the new coronavirus  Do not handle or care for animals until you are well  Care includes feeding, petting, and cuddling your pet  Do not let your pet lick you or share your food  Ask someone who is not infected to take care of your pet, if possible  If you must care for a pet, wear a face covering  Wash your hands before and after you give care  · Follow directions from your healthcare provider for being around others after you recover  You will need to wait at least 10 days after symptoms first appeared  Then you will need to have no fever for 24 hours without fever medicine, and no other symptoms  A loss of taste or smell may continue for several months  It is considered okay to be around others if this is your only symptom  It is not known for sure if or for how long a recovered person can pass the virus to others  Your provider may give you instructions, such as continuing social distancing or wearing a face covering around others      How to take care of someone who has COVID-19:  If the person lives in another home, arrange for a time to give care  Remember to bring a few pairs of disposable gloves and a cloth face covering  The following are general guidelines to help you safely care for anyone who has COVID-19:  · Wash your hands often  Wash before and after you go into the person's home, area, or room  Throw paper towels away in a lined trash can that has a lid, if possible  · Do not allow others to go near the person  No one should come into the person's home unless it is necessary  If possible, the person should be in a separate area or room if he or she lives with others  Keep the room's door shut unless you need to go in or out  Have others call, video chat, or e-mail the person if he or she is feeling well enough  The person may feel lonely if he or she is kept separate for a long period of time  Safe communication can help him or her stay connected to family and friends  · Make sure the person's room has good air flow  You may be able to open the window if the weather allows  An air conditioner can also be turned on to help air move  · Contact the person before you go in to give care  Make sure the person is wearing a face covering  Remind him or her to wash his or her hands with soap and water  He or she can use hand  that contains alcohol if soap and water are not available  Put on a face covering before you go in to give care  · Wear gloves while you give care and clean  Clean items the person uses often  Clean countertops, cooking surfaces, and the fronts and insides of the microwave and refrigerator  Clean the shower, toilet, the area around the toilet, the sink, the area around the sink, and faucets  Gather used laundry or bedding  Wash and dry items on the warmest settings the fabric allows  Wash dishes and silverware in hot, soapy water or in a   · Anything you throw away needs to go into a lined trash can    When you need to empty the trash, close the open end of the lining and tie it closed  This helps prevent items the virus is on from spilling out of the trash  Remove your gloves and throw them away  Wash your hands  Follow up with your doctor as directed:  Write down your questions so you remember to ask them during your visits  For more information:   · Centers for Disease Control and Prevention  [address] , [address]  Phone: 4- 666 - [phone]  Web Address: [employer] com br    © Copyright [address] [place] [address] Drive Information is for End User's use only and may not be sold, redistributed or otherwise used for commercial purposes  All illustrations and images included in CareNotes® are the copyrighted property of [employer] D A M [employer]  or [facility] Thomas   The above information is an  only  It is not intended as medical advice for individual conditions or treatments  Talk to your doctor, nurse or pharmacist before following any medical regimen to see if it is safe and effective for you

## 2021-02-07 NOTE — PROGRESS NOTES
COVID-19 Virtual Visit     Assessment/Plan:    Problem List Items Addressed This Visit     None      Visit Diagnoses     Lab test positive for detection of COVID-19 virus    -  Primary         Disposition:     I recommended continued isolation until at least 24 hours have passed since recovery defined as resolution of fever without the use of fever-reducing medications AND improvement in COVID symptoms AND 10 days have passed since onset of symptoms (or 10 days have passed since date of first positive viral diagnostic test for asymptomatic patients)  A/P: Day # 7 since onset of COVID s/s  Covid test was positive  Doing better and slight SOB  Continue isolation/quarantine, increase fluids, PRN motrin/tylenol, and breathing exercises  RTC one day for f/u  I have spent 15 minutes directly with the patient  Greater than 50% of this time was spent in counseling/coordination of care regarding: prognosis, risks and benefits of treatment options, instructions for management, patient and family education, importance of treatment compliance, risk factor reductions and impressions  Encounter provider [facility], DO    Provider located at [address] [place] [id number]    Recent Visits  Date Type Provider Dept   02/06/21 Telemedicine [facility], DO [facility]   02/05/21 [facility], DO [facility]   02/04/21 Telemedicine [facility], DO [facility]   02/03/21 Telemedicine [facility], DO [facility]   Showing recent visits within past 7 days and meeting all other requirements     Today's Visits  Date Type Provider Dept   02/07/21 Telemedicine [facility], DO [facility] today's visits and meeting all other requirements     Future Appointments  No visits were found meeting these conditions     Showing future appointments within next 150 days and meeting all other requirements      This virtual check-in was done via [employer] and patient was informed that this is a secure, HIPAA-compliant platform  She agrees to proceed  Patient agrees to participate in a virtual check in via telephone or video visit instead of presenting to the office to address urgent/immediate medical needs  Patient is aware this is a billable service  After connecting through [facility], the patient was identified by name and date of birth  Rodri Perez was informed that this was a telemedicine visit and that the exam was being conducted confidentially over secure lines  My office door was closed  No one else was in the room  Rodri Perez acknowledged consent and understanding of privacy and security of the telemedicine visit  I informed the patient that I have reviewed her record in Epic and presented the opportunity for her to ask any questions regarding the visit today  The patient agreed to participate  Subjective: Rodri Perez is a 80 y o  female who has been screened for COVID-19  Symptom change since last report: improving  Patient's symptoms include shortness of breath  Patient denies fever, chills, fatigue, congestion, rhinorrhea, sore throat, anosmia, loss of taste, cough, chest tightness, abdominal pain, nausea, vomiting, diarrhea, myalgias and headaches  Clare Dickens has been staying home and has isolated themselves in her home  She is taking care to not share personal items and is cleaning all surfaces that are touched often, like counters, tabletops, and doorknobs using household cleaning sprays or wipes  She is wearing a mask when she leaves her room       Date of symptom onset: 1/31/2021  Date of positive COVID-19 PCR: 2/3/2021    Lab Results   Component Value Date    SARSCOV2 Positive (A) 02/03/2021     Past Medical History:   Diagnosis Date    [facility]ce[facility]bro[facility] [address] [facility]     Chronic kidney disease     Dementia [facility] [address] [facility]     Gout     Hypertension     Osteoporosis      Past Surgical History:   Procedure Laterality Date    APPENDECTOMY      HYSTERECTOMY      TUBAL LIGATION      UTERINE FIBROID SURGERY       Current Outpatient Medications   Medication Sig Dispense Refill    alendronate (FOSAMAX) 70 mg tablet Take 1 tablet by mouth once a week 12 tablet 3    allopurinol (ZYLOPRIM) 100 mg tablet Take 1 tablet (100 mg total) by mouth daily 90 tablet 1    amLODIPine (NORVASC) 5 mg tablet Take 1 tablet (5 mg total) by mouth daily 90 tablet 3    atenolol (TENORMIN) 50 mg tablet Take 1 tablet (50 mg total) by mouth daily 30 tablet 5    atorvastatin (LIPITOR) 40 mg tablet Take 1 tablet (40 mg total) by mouth daily 90 tablet 1    clopidogrel (PLAVIX) 75 mg tablet Take 1 tablet (75 mg total) by mouth daily 90 tablet 1    donepezil (ARICEPT) 5 mg tablet Take 1 tablet (5 mg total) by mouth daily at bedtime 90 tablet 3    ergocalciferol (VITAMIN D2) 50,000 units Take 1 capsule (50,000 Units total) by mouth once a week 12 capsule 3    fluticasone (FLONASE) 50 mcg/act nasal spray Use 1 spray(s) in each nostril once daily 16 g 5    hydrochlorothiazide (HYDRODIURIL) 50 mg tablet Take 1 tablet (50 mg total) by mouth daily 90 tablet 1    lisinopril (ZESTRIL) 40 mg tablet Take 1 tablet (40 mg total) by mouth daily 90 tablet 1    loratadine (CLARITIN) 10 mg tablet Take 1 tablet (10 mg total) by mouth daily 90 tablet 0    Multiple Vitamins-Minerals (MULTI ADULT GUMMIES) CHEW Chew 1 tablet daily      OMEGA-3 FATTY ACIDS-VITAMIN E PO Take 1,000 mg by mouth      pentoxifylline (TRENtal) 400 mg ER tablet TAKE 1 TABLET BY MOUTH THREE TIMES DAILY WITH MEALS 90 tablet 1     No current facility-administered medications for this visit  Allergies   Allergen Reactions    Oxycodone-Acetaminophen Rash       Review of Systems   Constitutional: Negative for activity change, chills, diaphoresis, fatigue and fever     HENT: Negative for congestion, postnasal drip, rhinorrhea, sinus pressure, sinus pain and sore throat  No loss of taste/smell  Respiratory: Positive for shortness of breath  Negative for cough, chest tightness and wheezing  Cardiovascular: Negative for chest pain, palpitations and leg swelling  Gastrointestinal: Negative for abdominal pain, constipation, diarrhea, nausea and vomiting  Genitourinary: Negative for difficulty urinating, dysuria and frequency  Musculoskeletal: Negative for arthralgias, gait problem and myalgias  Neurological: Negative for light-headedness and headaches  Psychiatric/Behavioral: Negative for confusion  The patient is not nervous/anxious  Objective:    Vitals:       Physical Exam  Vitals signs and nursing note reviewed  Constitutional:       General: She is not in acute distress  Appearance: Normal appearance  She is not ill-appearing  HENT:      Head: Normocephalic and atraumatic  Eyes:      Extraocular Movements: Extraocular movements intact  Conjunctiva/sclera: Conjunctivae normal       Pupils: Pupils are equal, round, and reactive to light  Pulmonary:      Effort: Pulmonary effort is normal  No respiratory distress  Neurological:      General: No focal deficit present  Mental Status: She is alert and oriented to person, place, and time  Mental status is at baseline  Psychiatric:         Mood and Affect: Mood normal          Behavior: Behavior normal          Thought Content: Thought content normal          Judgment: Judgment normal        VIRTUAL VISIT DISCLAIMER    Shaguftaglendy Velasquez acknowledges that she has consented to an online visit or consultation  She understands that the online visit is based solely on information provided by her, and that, in the absence of a face-to-face physical evaluation by the physician, the diagnosis she receives is both limited and provisional in terms of accuracy and completeness   This is not intended to replace a full medical face-to-face evaluation by the physician  Marcos Velasquez understands and accepts these terms

## 2021-02-08 ENCOUNTER — TELEMEDICINE (OUTPATIENT)
Dept: INTERNAL MEDICINE CLINIC | Facility: CLINIC | Age: 81
End: 2021-02-08
Payer: COMMERCIAL

## 2021-02-08 DIAGNOSIS — U07.1 LAB TEST POSITIVE FOR DETECTION OF COVID-19 VIRUS: Primary | ICD-10-CM

## 2021-02-08 PROCEDURE — 99212 OFFICE O/P EST SF 10 MIN: CPT | Performed by: INTERNAL MEDICINE

## 2021-02-08 NOTE — PATIENT INSTRUCTIONS
COVID-19 (Coronavirus Disease 2019)   WHAT YOU NEED TO KNOW:   COVID-19 is the disease caused by the novel (new) coronavirus first discovered in December 2019  Coronaviruses generally cause upper respiratory (nose, throat, and lung) infections, such as a cold  The new virus can also cause serious lower respiratory conditions, such as pneumonia or acute respiratory distress syndrome (ARDS)  Anyone can develop serious problems from the new virus, but your risk is higher if you are 65 or older  A weak immune system, diabetes, or a heart or lung condition can also increase your risk  DISCHARGE INSTRUCTIONS:   If you think you or someone you know may be infected:  Do the following to protect others:  · If emergency care is needed,  tell the  about the possible infection, or call ahead and tell the emergency department  · Call a healthcare provider  for instructions if symptoms are mild  Anyone who may be infected should not  arrive without calling first  The provider will need to protect staff members and other patients  · The person who may be infected needs to wear a face covering  while getting medical care  This will help lower the risk of infecting others  Coverings are not used for anyone who is younger than 2 years, has breathing problems, or cannot remove it  The provider can give you instructions for anyone who cannot wear a covering  Call your local emergency number (911 in the [address] [facility]) or go to the emergency department if:   · You have trouble breathing or shortness of breath at rest     · You have chest pain or pressure that lasts longer than 5 minutes  · You become confused or hard to wake  · Your lips or face are blue  · You have a fever of 104°F (40°C) or higher  Call your doctor if:   · You do not  have symptoms of COVID-19 but had close physical contact within 14 days with someone who tested positive  · You have questions or concerns about your condition or care      Medicines: You may need any of the following for mild symptoms:  · Decongestants  help reduce nasal congestion and help you breathe more easily  If you take decongestant pills, they may make you feel restless or cause problems with your sleep  Do not use decongestant sprays for more than a few days  · Cough suppressants  help reduce coughing  Ask your healthcare provider which type of cough medicine is best for you  · Acetaminophen  decreases pain and fever  It is available without a doctor's order  Ask how much to take and how often to take it  Follow directions  Read the labels of all other medicines you are using to see if they also contain acetaminophen, or ask your doctor or pharmacist  Acetaminophen can cause liver damage if not taken correctly  Do not use more than 4 grams (4,000 milligrams) total of acetaminophen in one day  · NSAIDs , such as ibuprofen, help decrease swelling, pain, and fever  NSAIDs can cause stomach bleeding or kidney problems in certain people  If you take blood thinner medicine, always ask your healthcare provider if NSAIDs are safe for you  Always read the medicine label and follow directions  · Take your medicine as directed  Contact your healthcare provider if you think your medicine is not helping or if you have side effects  Tell him or her if you are allergic to any medicine  Keep a list of the medicines, vitamins, and herbs you take  Include the amounts, and when and why you take them  Bring the list or the pill bottles to follow-up visits  Carry your medicine list with you in case of an emergency  How the 2019 coronavirus spreads: The virus spreads quickly and easily  You can become infected if you are in contact with a large amount of the virus, even for a short time  You can also become infected by being around a small amount of virus for a long time   The following are ways the virus is thought to spread, but more information may be coming:  · Droplets are the most common way all coronaviruses spread  The virus can travel in droplets that form when a person talks, coughs, or sneezes  Anyone who breathes in the droplets or gets them in his or her eyes can become infected with the virus  Close personal contact with an infected person is thought to be the main way the virus spreads  Close personal contact means you are within 6 feet (2 meters) of the person  · Person-to-person contact can spread the virus  For example, a person with the virus on his or her hands can spread it by shaking hands with someone  At this time, it does not appear that the virus can be passed to a baby during pregnancy or delivery  The baby can be infected after he or she is born through person-to-person contact  The virus also does not appear to spread in breast milk  If you are pregnant or breastfeeding, talk to your healthcare provider or obstetrician about any concerns you have  · The virus can stay on objects and surfaces  A person can get the virus on his or her hands by touching the object or surface  Infection happens if the person then touches his or her eyes or mouth with unwashed hands  It is not yet known how long the virus can stay on an object or surface  That is why it is important to clean all surfaces that are used regularly  · An infected animal may be able to infect a person who touches it  This may happen at live markets or on a farm  How everyone can lower the risk for COVID-19:  The best way to prevent infection is to avoid anyone who is infected, but this can be hard to do  An infected person can spread the virus before signs or symptoms begin, or even if signs or symptoms never develop  The following can help lower the risk for infection:      · Wash your hands often throughout the day  Use soap and water  Rub your soapy hands together, lacing your fingers  Wash the front and back of each hand, and in between your fingers   Use the fingers of one hand to scrub under the fingernails of the other hand  Wash for at least 20 seconds  Rinse with warm, running water for several seconds  Then dry your hands with a clean towel or paper towel  Use hand  that contains alcohol if soap and water are not available  Do not touch your eyes, nose, or mouth without washing your hands first  Teach children how to wash their hands and use hand   · Cover a sneeze or cough  This prevents droplets from traveling from you to others  Turn your face away and cover your mouth and nose with a tissue  Throw the tissue away  Use the bend of your arm if a tissue is not available  Then wash your hands well with soap and water or use hand   Turn and cover your face if you are around someone who is sneezing or coughing  Teach children how to cover a cough or sneeze  · Follow worldwide, national, and local social distancing guidelines  Social distancing means people avoid close physical contact so the virus cannot spread from one person to another  Keep at least 6 feet (2 meters) between you and others  Also keep this distance from anyone who comes to your home, such as someone making a delivery  · Make a habit of not touching your face  It is not known how long the virus can stay on objects and surfaces  If you get the virus on your hands, you can transfer it to your eyes, nose, or mouth and become infected  You can also transfer it to objects, surfaces, or people  Be aware of what you touch when you go out  Examples include handrails and elevator buttons  Try not to touch anything with bare hands unless it is necessary  Wash your hands before you leave your home and when you return  · Clean and disinfect high-touch surfaces and objects often  Use a disinfecting solution or wipes  You can make a solution by diluting 4 teaspoons of bleach in 1 quart (4 cups) of water   Clean and disinfect even if you think no one living in or coming to your home is infected with the virus  You can wipe items with a disinfecting cloth before you bring them into your home  Wash your hands after you handle anything you bring into your home  · Make your immune system as healthy as possible  A weakened immune system makes you more vulnerable to the new coronavirus  No COVID-19 vaccine is available yet  Vaccines such as the flu and pneumonia vaccines can help your immune system  Your healthcare provider can tell you which vaccines to get, and when to get them  Keep your immune system as strong as possible  Do not smoke  Eat healthy foods, exercise regularly, and try to manage stress  Go to bed and wake up at the same times each day  Social distancing guidelines:  National and local social distancing rules vary  Rules may change over time as restrictions are lifted  Restrictions may return if an outbreak happens where you live  It is important to know and follow all current social distancing rules in your area  The following are general guidelines:  · Limit trips out of your home  You may be able to have food, medicines, and other supplies delivered  If possible, have delivered items left at your door or other area  Try not to have someone hand you an item  You will be so close to the person that the virus can spread between you  · Do not have close physical contact with anyone who does not live in your home  Do not shake hands with, hug, or kiss a person as a greeting  Stand or walk as far from others as possible  If you must use public transportation (such as a bus or subway), try to sit or stand away from others  You can stay safely connected with others through phone calls, e-mail messages, social media websites, and video chats  Check in on anyone who may be having a hard time socially distancing, or who lives alone  Ask administrators at nursing homes or long-term care facilities how you can safely communicate with someone living there      · Wear a cloth face covering around others who do not live in your home  Face coverings help prevent the virus from spreading to others in droplets  You can use a clear face covering if someone needs to read your lips  This is a cloth covering that has plastic over the mouth area so your lips can be seen  Do not use coverings that have breathing valves or vents  The virus can travel out of the valve or vent and be spread to others  Do not take your covering off to talk, cough, or sneeze  Do not use coverings on children younger than 2 years or on anyone who has breathing problems or cannot remove it  · Only allow medical or other necessary professionals into your home  Wear your face covering, and remind professionals to wear a face covering  Remind them to wash their hands when they arrive and before they leave  Do not  let anyone who does not live in your home in, even if the person is not sick  A person can pass the virus to others before symptoms of COVID-19 begin  Some people never even develop symptoms  Children commonly have mild symptoms or no symptoms  It may be hard to tell a child not to hug or kiss you  Explain that this is how he or she can help you stay healthy  · Do not go to someone else's home unless it is necessary  Do not go over to visit, even if the person is lonely  Only go if you need to help him or her  Make sure you both wear face coverings while you are there  · Avoid large gatherings and crowds  Gatherings or crowds of 10 or more individuals can cause the virus to spread  Examples of gatherings include parties, sporting events, [employer] services, and conferences  Crowds may form at beaches, [place], and tourist attractions  Protect yourself by staying away from large gatherings and crowds  · Ask your healthcare provider for other ways to have appointments  You may be able to have appointments without having to go into the provider's office  Some providers offer phone, video, or other types of appointments  You may also be able to get prescriptions for a few months of your medicines at a time  · Stay safe if you must go out to work  You may have a job that can only be done outside your home  Keep physical distance between you and other workers as much as possible  Follow your employer's rules so everyone stays safe  If you have COVID-19 and are recovering at home:  Healthcare providers will give you specific instructions to follow  The following are general guidelines to remind you how to keep others safe until you are well:  · Wash your hands often  Use soap and water as much as possible  You can use hand  that contains alcohol if soap and water are not available  Do not share towels with anyone  If you use paper towels, throw them away in a lined trash can kept in your room or area  Use a covered trash can, if possible  · Do not go out of your home unless it is necessary  You may have to go to your healthcare provider's office for check-ups or to get prescription refills  Do not arrive at the provider's office without an appointment  Providers have to make their offices safe for staff and other patients  · Do not have close physical contact with anyone unless it is necessary  Only have close physical contact with a person giving direct care, or a baby or child you must care for  Family members and friends should not visit you  If possible, stay in a separate area or room of your home if you live with others  No one should go into the area or room except to give you care  You can visit with others by phone, video chat, e-mail, or similar systems  It is important to stay connected with others in your life while you recover  · Wear a face covering while others are near you  This can help prevent droplets from spreading the virus when you talk, sneeze, or cough  Put the covering on before anyone comes into your room or area   Remind the person to cover his or her nose and mouth before going in to provide care for you  · Do not share items  Do not share dishes, towels, or other items with anyone  Items need to be washed after you use them  · Protect your baby  Wash your hands with soap and water often throughout the day  Wear a clean face covering while you breastfeed, or while you express or pump breast milk  If possible, ask someone who is well to care for your baby  You can put breast milk in bottles for the person to use, if needed  Talk to your healthcare provider if you have any questions or concerns about caring for or bonding with your baby  He or she will tell you when to bring your baby in for check-ups and vaccines  He or she will also tell you what to do if you think your baby was infected with the new virus  · Do not handle live animals  Until more is known, it is best not to touch, play with, or handle live animals  Some animals, including pets, have been infected with the new coronavirus  Do not handle or care for animals until you are well  Care includes feeding, petting, and cuddling your pet  Do not let your pet lick you or share your food  Ask someone who is not infected to take care of your pet, if possible  If you must care for a pet, wear a face covering  Wash your hands before and after you give care  · Follow directions from your healthcare provider for being around others after you recover  You will need to wait at least 10 days after symptoms first appeared  Then you will need to have no fever for 24 hours without fever medicine, and no other symptoms  A loss of taste or smell may continue for several months  It is considered okay to be around others if this is your only symptom  It is not known for sure if or for how long a recovered person can pass the virus to others  Your provider may give you instructions, such as continuing social distancing or wearing a face covering around others      How to take care of someone who has COVID-19:  If the person lives in another home, arrange for a time to give care  Remember to bring a few pairs of disposable gloves and a cloth face covering  The following are general guidelines to help you safely care for anyone who has COVID-19:  · Wash your hands often  Wash before and after you go into the person's home, area, or room  Throw paper towels away in a lined trash can that has a lid, if possible  · Do not allow others to go near the person  No one should come into the person's home unless it is necessary  If possible, the person should be in a separate area or room if he or she lives with others  Keep the room's door shut unless you need to go in or out  Have others call, video chat, or e-mail the person if he or she is feeling well enough  The person may feel lonely if he or she is kept separate for a long period of time  Safe communication can help him or her stay connected to family and friends  · Make sure the person's room has good air flow  You may be able to open the window if the weather allows  An air conditioner can also be turned on to help air move  · Contact the person before you go in to give care  Make sure the person is wearing a face covering  Remind him or her to wash his or her hands with soap and water  He or she can use hand  that contains alcohol if soap and water are not available  Put on a face covering before you go in to give care  · Wear gloves while you give care and clean  Clean items the person uses often  Clean countertops, cooking surfaces, and the fronts and insides of the microwave and refrigerator  Clean the shower, toilet, the area around the toilet, the sink, the area around the sink, and faucets  Gather used laundry or bedding  Wash and dry items on the warmest settings the fabric allows  Wash dishes and silverware in hot, soapy water or in a   · Anything you throw away needs to go into a lined trash can    When you need to empty the trash, close the open end of the lining and tie it closed  This helps prevent items the virus is on from spilling out of the trash  Remove your gloves and throw them away  Wash your hands  Follow up with your doctor as directed:  Write down your questions so you remember to ask them during your visits  For more information:   · Centers for Disease Control and Prevention  [address] , [address]  Phone: 0- 246 - 576-3750  Web Address: [employer] com br    © Copyright [address] [place] [address] Drive Information is for End User's use only and may not be sold, redistributed or otherwise used for commercial purposes  All illustrations and images included in CareNotes® are the copyrighted property of A D A M [employer]  or [facility]  The above information is an  only  It is not intended as medical advice for individual conditions or treatments  Talk to your doctor, nurse or pharmacist before following any medical regimen to see if it is safe and effective for you

## 2021-02-08 NOTE — PROGRESS NOTES
COVID-19 Virtual Visit     Assessment/Plan:    Problem List Items Addressed This Visit     None      Visit Diagnoses     Lab test positive for detection of COVID-19 virus    -  Primary         Disposition:     I recommended continued isolation until at least 24 hours have passed since recovery defined as resolution of fever without the use of fever-reducing medications AND improvement in COVID symptoms AND 10 days have passed since onset of symptoms (or 10 days have passed since date of first positive viral diagnostic test for asymptomatic patients)  A/P: Day # 8 since onset of COVID s/s  Covid test was positive  Doing doing well and continues to improve  Very slight SOB and diarrhea, but both improving    Continue isolation/quarantine, increase fluids, PRN motrin/tylenol, and breathing exercises  RTC one day for f/u  I have spent 15 minutes directly with the patient  Greater than 50% of this time was spent in counseling/coordination of care regarding: prognosis, risks and benefits of treatment options, instructions for management, patient and family education, importance of treatment compliance, risk factor reductions and impressions          Encounter provider Marita Latif DO    Provider located at [address] [place] [id number]    Recent Visits  Date Type Provider Dept   02/07/21 Telemedicine Marita Latif DO [facility]   02/06/21 Telemedicine Marita Latif DO [facility]   02/05/21 Telemedicine Marita Latif DO [facility]   02/04/21 Telemedicine Marita Latif DO [facility]   02/03/21 Telemedicine Marita Latif DO [facility]   Showing recent visits within past 7 days and meeting all other requirements     Today's Visits  Date Type Provider Dept   02/08/21 Telemedicine DO Viktor Carroll today's visits and meeting all other requirements     Future Appointments  No visits were found meeting these conditions  Showing future appointments within next 150 days and meeting all other requirements        Patient agrees to participate in a virtual check in via telephone or video visit instead of presenting to the office to address urgent/immediate medical needs  Patient is aware this is a billable service  After connecting through Telephone, the patient was identified by name and date of birth  Deya Skelton was informed that this was a telemedicine visit and that the exam was being conducted confidentially over secure lines  My office door was closed  No one else was in the room  Deya Skelton acknowledged consent and understanding of privacy and security of the telemedicine visit  I informed the patient that I have reviewed her record in Epic and presented the opportunity for her to ask any questions regarding the visit today  The patient agreed to participate  It was my intent to perform this visit via video technology but the patient was not able to do a video connection so the visit was completed via audio telephone only  Subjective: Deya Skelton is a 80 y o  female who has been screened for COVID-19  Symptom change since last report: improving  Patient's symptoms include diarrhea  Patient denies fever, chills, fatigue, congestion, rhinorrhea, sore throat, anosmia, loss of taste, cough, shortness of breath, chest tightness, abdominal pain, nausea, vomiting, myalgias and headaches  Lucina Rosales has been staying home and has isolated themselves in her home  She is taking care to not share personal items and is cleaning all surfaces that are touched often, like counters, tabletops, and doorknobs using household cleaning sprays or wipes  She is wearing a mask when she leaves her room       Date of symptom onset: 1/31/2021  Date of positive COVID-19 PCR: 2/3/2021    Lab Results   Component Value Date    SARSCOV2 Positive (A) 02/03/2021     Past Medical History:   Diagnosis Date    [facility]ce[facility] [address] [facility]     Chronic kidney disease     [facility]     Gout     Hypertension     Osteoporosis      Past Surgical History:   Procedure Laterality Date    APPENDECTOMY      HYSTERECTOMY      TUBAL LIGATION      UTERINE FIBROID SURGERY       Current Outpatient Medications   Medication Sig Dispense Refill    alendronate (FOSAMAX) 70 mg tablet Take 1 tablet by mouth once a week 12 tablet 3    allopurinol (ZYLOPRIM) 100 mg tablet Take 1 tablet (100 mg total) by mouth daily 90 tablet 1    amLODIPine (NORVASC) 5 mg tablet Take 1 tablet (5 mg total) by mouth daily 90 tablet 3    atenolol (TENORMIN) 50 mg tablet Take 1 tablet (50 mg total) by mouth daily 30 tablet 5    atorvastatin (LIPITOR) 40 mg tablet Take 1 tablet (40 mg total) by mouth daily 90 tablet 1    clopidogrel (PLAVIX) 75 mg tablet Take 1 tablet (75 mg total) by mouth daily 90 tablet 1    donepezil (ARICEPT) 5 mg tablet Take 1 tablet (5 mg total) by mouth daily at bedtime 90 tablet 3    ergocalciferol (VITAMIN D2) 50,000 units Take 1 capsule (50,000 Units total) by mouth once a week 12 capsule 3    fluticasone (FLONASE) 50 mcg/act nasal spray Use 1 spray(s) in each nostril once daily 16 g 5    hydrochlorothiazide (HYDRODIURIL) 50 mg tablet Take 1 tablet (50 mg total) by mouth daily 90 tablet 1    lisinopril (ZESTRIL) 40 mg tablet Take 1 tablet (40 mg total) by mouth daily 90 tablet 1    loratadine (CLARITIN) 10 mg tablet Take 1 tablet (10 mg total) by mouth daily 90 tablet 0    Multiple Vitamins-Minerals (MULTI ADULT GUMMIES) CHEW Chew 1 tablet daily      OMEGA-3 FATTY ACIDS-VITAMIN E PO Take 1,000 mg by mouth      pentoxifylline (TRENtal) 400 mg ER tablet TAKE 1 TABLET BY MOUTH THREE TIMES DAILY WITH MEALS 90 tablet 1     No current facility-administered medications for this visit        Allergies   Allergen Reactions    Oxycodone-Acetaminophen Rash       Review of Systems   Constitutional: Positive for activity change  Negative for chills, diaphoresis, fatigue and fever  HENT: Negative for congestion, postnasal drip, rhinorrhea, sinus pain and sore throat  No loss of taste/smell  Respiratory: Negative for cough, chest tightness, shortness of breath and wheezing  Cardiovascular: Negative for chest pain, palpitations and leg swelling  Gastrointestinal: Positive for diarrhea  Negative for abdominal pain, constipation, nausea and vomiting  Genitourinary: Negative for difficulty urinating, dysuria and frequency  Musculoskeletal: Negative for arthralgias, gait problem and myalgias  Neurological: Negative for light-headedness and headaches  Psychiatric/Behavioral: Negative for confusion  The patient is not nervous/anxious  Objective:    Vitals:       Physical Exam  Constitutional:       General: She is not in acute distress  Appearance: Normal appearance  She is not ill-appearing  HENT:      Head: Normocephalic and atraumatic  Eyes:      Extraocular Movements: Extraocular movements intact  Conjunctiva/sclera: Conjunctivae normal       Pupils: Pupils are equal, round, and reactive to light  Pulmonary:      Effort: Pulmonary effort is normal  No respiratory distress  Neurological:      General: No focal deficit present  Mental Status: She is alert  Mental status is at baseline  Psychiatric:         Mood and Affect: Mood normal          Behavior: Behavior normal          Thought Content: Thought content normal          Judgment: Judgment normal        VIRTUAL VISIT DISCLAIMER    Shagufta Velasquez acknowledges that she has consented to an online visit or consultation  She understands that the online visit is based solely on information provided by her, and that, in the absence of a face-to-face physical evaluation by the physician, the diagnosis she receives is both limited and provisional in terms of accuracy and completeness   This is not intended to replace a full medical face-to-face evaluation by the physician  Neville Velasquez understands and accepts these terms

## 2021-02-09 ENCOUNTER — TELEMEDICINE (OUTPATIENT)
Dept: INTERNAL MEDICINE CLINIC | Facility: CLINIC | Age: 81
End: 2021-02-09
Payer: COMMERCIAL

## 2021-02-09 DIAGNOSIS — U07.1 LAB TEST POSITIVE FOR DETECTION OF COVID-19 VIRUS: Primary | ICD-10-CM

## 2021-02-09 PROCEDURE — 99212 OFFICE O/P EST SF 10 MIN: CPT | Performed by: INTERNAL MEDICINE

## 2021-02-09 NOTE — PATIENT INSTRUCTIONS
COVID-19 (Coronavirus Disease 2019)   WHAT YOU NEED TO KNOW:   COVID-19 is the disease caused by the novel (new) coronavirus first discovered in December 2019  Coronaviruses generally cause upper respiratory (nose, throat, and lung) infections, such as a cold  The new virus can also cause serious lower respiratory conditions, such as pneumonia or acute respiratory distress syndrome (ARDS)  Anyone can develop serious problems from the new virus, but your risk is higher if you are 65 or older  A weak immune system, diabetes, or a heart or lung condition can also increase your risk  DISCHARGE INSTRUCTIONS:   If you think you or someone you know may be infected:  Do the following to protect others:  · If emergency care is needed,  tell the  about the possible infection, or call ahead and tell the emergency department  · Call a healthcare provider  for instructions if symptoms are mild  Anyone who may be infected should not  arrive without calling first  The provider will need to protect staff members and other patients  · The person who may be infected needs to wear a face covering  while getting medical care  This will help lower the risk of infecting others  Coverings are not used for anyone who is younger than 2 years, has breathing problems, or cannot remove it  The provider can give you instructions for anyone who cannot wear a covering  Call your local emergency number (911 in the [facility]) or go to the emergency department if:   · You have trouble breathing or shortness of breath at rest     · You have chest pain or pressure that lasts longer than 5 minutes  · You become confused or hard to wake  · Your lips or face are blue  · You have a fever of 104°F (40°C) or higher  Call your doctor if:   · You do not  have symptoms of COVID-19 but had close physical contact within 14 days with someone who tested positive  · You have questions or concerns about your condition or care      Medicines: You may need any of the following for mild symptoms:  · Decongestants  help reduce nasal congestion and help you breathe more easily  If you take decongestant pills, they may make you feel restless or cause problems with your sleep  Do not use decongestant sprays for more than a few days  · Cough suppressants  help reduce coughing  Ask your healthcare provider which type of cough medicine is best for you  · Acetaminophen  decreases pain and fever  It is available without a doctor's order  Ask how much to take and how often to take it  Follow directions  Read the labels of all other medicines you are using to see if they also contain acetaminophen, or ask your doctor or pharmacist  Acetaminophen can cause liver damage if not taken correctly  Do not use more than 4 grams (4,000 milligrams) total of acetaminophen in one day  · NSAIDs , such as ibuprofen, help decrease swelling, pain, and fever  NSAIDs can cause stomach bleeding or kidney problems in certain people  If you take blood thinner medicine, always ask your healthcare provider if NSAIDs are safe for you  Always read the medicine label and follow directions  · Take your medicine as directed  Contact your healthcare provider if you think your medicine is not helping or if you have side effects  Tell him or her if you are allergic to any medicine  Keep a list of the medicines, vitamins, and herbs you take  Include the amounts, and when and why you take them  Bring the list or the pill bottles to follow-up visits  Carry your medicine list with you in case of an emergency  How the 2019 coronavirus spreads: The virus spreads quickly and easily  You can become infected if you are in contact with a large amount of the virus, even for a short time  You can also become infected by being around a small amount of virus for a long time   The following are ways the virus is thought to spread, but more information may be coming:  · Droplets are the most common way all coronaviruses spread  The virus can travel in droplets that form when a person talks, coughs, or sneezes  Anyone who breathes in the droplets or gets them in his or her eyes can become infected with the virus  Close personal contact with an infected person is thought to be the main way the virus spreads  Close personal contact means you are within 6 feet (2 meters) of the person  · Person-to-person contact can spread the virus  For example, a person with the virus on his or her hands can spread it by shaking hands with someone  At this time, it does not appear that the virus can be passed to a baby during pregnancy or delivery  The baby can be infected after he or she is born through person-to-person contact  The virus also does not appear to spread in breast milk  If you are pregnant or breastfeeding, talk to your healthcare provider or obstetrician about any concerns you have  · The virus can stay on objects and surfaces  A person can get the virus on his or her hands by touching the object or surface  Infection happens if the person then touches his or her eyes or mouth with unwashed hands  It is not yet known how long the virus can stay on an object or surface  That is why it is important to clean all surfaces that are used regularly  · An infected animal may be able to infect a person who touches it  This may happen at live markets or on a farm  How everyone can lower the risk for COVID-19:  The best way to prevent infection is to avoid anyone who is infected, but this can be hard to do  An infected person can spread the virus before signs or symptoms begin, or even if signs or symptoms never develop  The following can help lower the risk for infection:      · Wash your hands often throughout the day  Use soap and water  Rub your soapy hands together, lacing your fingers  Wash the front and back of each hand, and in between your fingers   Use the fingers of one hand to scrub under the fingernails of the other hand  Wash for at least 20 seconds  Rinse with warm, running water for several seconds  Then dry your hands with a clean towel or paper towel  Use hand  that contains alcohol if soap and water are not available  Do not touch your eyes, nose, or mouth without washing your hands first  Teach children how to wash their hands and use hand   · Cover a sneeze or cough  This prevents droplets from traveling from you to others  Turn your face away and cover your mouth and nose with a tissue  Throw the tissue away  Use the bend of your arm if a tissue is not available  Then wash your hands well with soap and water or use hand   Turn and cover your face if you are around someone who is sneezing or coughing  Teach children how to cover a cough or sneeze  · Follow worldwide, national, and local social distancing guidelines  Social distancing means people avoid close physical contact so the virus cannot spread from one person to another  Keep at least 6 feet (2 meters) between you and others  Also keep this distance from anyone who comes to your home, such as someone making a delivery  · Make a habit of not touching your face  It is not known how long the virus can stay on objects and surfaces  If you get the virus on your hands, you can transfer it to your eyes, nose, or mouth and become infected  You can also transfer it to objects, surfaces, or people  Be aware of what you touch when you go out  Examples include handrails and elevator buttons  Try not to touch anything with bare hands unless it is necessary  Wash your hands before you leave your home and when you return  · Clean and disinfect high-touch surfaces and objects often  Use a disinfecting solution or wipes  You can make a solution by diluting 4 teaspoons of bleach in 1 quart (4 cups) of water   Clean and disinfect even if you think no one living in or coming to your home is infected with the virus  You can wipe items with a disinfecting cloth before you bring them into your home  Wash your hands after you handle anything you bring into your home  · Make your immune system as healthy as possible  A weakened immune system makes you more vulnerable to the new coronavirus  No COVID-19 vaccine is available yet  Vaccines such as the flu and pneumonia vaccines can help your immune system  Your healthcare provider can tell you which vaccines to get, and when to get them  Keep your immune system as strong as possible  Do not smoke  Eat healthy foods, exercise regularly, and try to manage stress  Go to bed and wake up at the same times each day  Social distancing guidelines:  National and local social distancing rules vary  Rules may change over time as restrictions are lifted  Restrictions may return if an outbreak happens where you live  It is important to know and follow all current social distancing rules in your area  The following are general guidelines:  · Limit trips out of your home  You may be able to have food, medicines, and other supplies delivered  If possible, have delivered items left at your door or other area  Try not to have someone hand you an item  You will be so close to the person that the virus can spread between you  · Do not have close physical contact with anyone who does not live in your home  Do not shake hands with, hug, or kiss a person as a greeting  Stand or walk as far from others as possible  If you must use public transportation (such as a bus or subway), try to sit or stand away from others  You can stay safely connected with others through phone calls, e-mail messages, social media websites, and video chats  Check in on anyone who may be having a hard time socially distancing, or who lives alone  Ask administrators at nursing homes or long-term care facilities how you can safely communicate with someone living there      · Wear a cloth face covering around others who do not live in your home  Face coverings help prevent the virus from spreading to others in droplets  You can use a clear face covering if someone needs to read your lips  This is a cloth covering that has plastic over the mouth area so your lips can be seen  Do not use coverings that have breathing valves or vents  The virus can travel out of the valve or vent and be spread to others  Do not take your covering off to talk, cough, or sneeze  Do not use coverings on children younger than 2 years or on anyone who has breathing problems or cannot remove it  · Only allow medical or other necessary professionals into your home  Wear your face covering, and remind professionals to wear a face covering  Remind them to wash their hands when they arrive and before they leave  Do not  let anyone who does not live in your home in, even if the person is not sick  A person can pass the virus to others before symptoms of COVID-19 begin  Some people never even develop symptoms  Children commonly have mild symptoms or no symptoms  It may be hard to tell a child not to hug or kiss you  Explain that this is how he or she can help you stay healthy  · Do not go to someone else's home unless it is necessary  Do not go over to visit, even if the person is lonely  Only go if you need to help him or her  Make sure you both wear face coverings while you are there  · Avoid large gatherings and crowds  Gatherings or crowds of 10 or more individuals can cause the virus to spread  Examples of gatherings include parties, sporting events, Denominational services, and conferences  Crowds may form at beaches, [place], and tourist attractions  Protect yourself by staying away from large gatherings and crowds  · Ask your healthcare provider for other ways to have appointments  You may be able to have appointments without having to go into the provider's office  Some providers offer phone, video, or other types of appointments  You may also be able to get prescriptions for a few months of your medicines at a time  · Stay safe if you must go out to work  You may have a job that can only be done outside your home  Keep physical distance between you and other workers as much as possible  Follow your employer's rules so everyone stays safe  If you have COVID-19 and are recovering at home:  Healthcare providers will give you specific instructions to follow  The following are general guidelines to remind you how to keep others safe until you are well:  · Wash your hands often  Use soap and water as much as possible  You can use hand  that contains alcohol if soap and water are not available  Do not share towels with anyone  If you use paper towels, throw them away in a lined trash can kept in your room or area  Use a covered trash can, if possible  · Do not go out of your home unless it is necessary  You may have to go to your healthcare provider's office for check-ups or to get prescription refills  Do not arrive at the provider's office without an appointment  Providers have to make their offices safe for staff and other patients  · Do not have close physical contact with anyone unless it is necessary  Only have close physical contact with a person giving direct care, or a baby or child you must care for  Family members and friends should not visit you  If possible, stay in a separate area or room of your home if you live with others  No one should go into the area or room except to give you care  You can visit with others by phone, video chat, e-mail, or similar systems  It is important to stay connected with others in your life while you recover  · Wear a face covering while others are near you  This can help prevent droplets from spreading the virus when you talk, sneeze, or cough  Put the covering on before anyone comes into your room or area   Remind the person to cover his or her nose and mouth before going in to provide care for you  · Do not share items  Do not share dishes, towels, or other items with anyone  Items need to be washed after you use them  · Protect your baby  Wash your hands with soap and water often throughout the day  Wear a clean face covering while you breastfeed, or while you express or pump breast milk  If possible, ask someone who is well to care for your baby  You can put breast milk in bottles for the person to use, if needed  Talk to your healthcare provider if you have any questions or concerns about caring for or bonding with your baby  He or she will tell you when to bring your baby in for check-ups and vaccines  He or she will also tell you what to do if you think your baby was infected with the new virus  · Do not handle live animals  Until more is known, it is best not to touch, play with, or handle live animals  Some animals, including pets, have been infected with the new coronavirus  Do not handle or care for animals until you are well  Care includes feeding, petting, and cuddling your pet  Do not let your pet lick you or share your food  Ask someone who is not infected to take care of your pet, if possible  If you must care for a pet, wear a face covering  Wash your hands before and after you give care  · Follow directions from your healthcare provider for being around others after you recover  You will need to wait at least 10 days after symptoms first appeared  Then you will need to have no fever for 24 hours without fever medicine, and no other symptoms  A loss of taste or smell may continue for several months  It is considered okay to be around others if this is your only symptom  It is not known for sure if or for how long a recovered person can pass the virus to others  Your provider may give you instructions, such as continuing social distancing or wearing a face covering around others      How to take care of someone who has COVID-19:  If the person lives in another home, arrange for a time to give care  Remember to bring a few pairs of disposable gloves and a cloth face covering  The following are general guidelines to help you safely care for anyone who has COVID-19:  · Wash your hands often  Wash before and after you go into the person's home, area, or room  Throw paper towels away in a lined trash can that has a lid, if possible  · Do not allow others to go near the person  No one should come into the person's home unless it is necessary  If possible, the person should be in a separate area or room if he or she lives with others  Keep the room's door shut unless you need to go in or out  Have others call, video chat, or e-mail the person if he or she is feeling well enough  The person may feel lonely if he or she is kept separate for a long period of time  Safe communication can help him or her stay connected to family and friends  · Make sure the person's room has good air flow  You may be able to open the window if the weather allows  An air conditioner can also be turned on to help air move  · Contact the person before you go in to give care  Make sure the person is wearing a face covering  Remind him or her to wash his or her hands with soap and water  He or she can use hand  that contains alcohol if soap and water are not available  Put on a face covering before you go in to give care  · Wear gloves while you give care and clean  Clean items the person uses often  Clean countertops, cooking surfaces, and the fronts and insides of the microwave and refrigerator  Clean the shower, toilet, the area around the toilet, the sink, the area around the sink, and faucets  Gather used laundry or bedding  Wash and dry items on the warmest settings the fabric allows  Wash dishes and silverware in hot, soapy water or in a   · Anything you throw away needs to go into a lined trash can    When you need to empty the trash, close the open end of the lining and tie it closed  This helps prevent items the virus is on from spilling out of the trash  Remove your gloves and throw them away  Wash your hands  Follow up with your doctor as directed:  Write down your questions so you remember to ask them during your visits  For more information:   · Centers for Disease Control and Prevention  [address] , [address]  Phone: 1- 970 - 774-9769  Web Address: [employer] com br    © Copyright [address] [place] [address] Drive Information is for End User's use only and may not be sold, redistributed or otherwise used for commercial purposes  All illustrations and images included in CareNotes® are the copyrighted property of A D A M , [employer]  or [facility]ilar Thomas   The above information is an  only  It is not intended as medical advice for individual conditions or treatments  Talk to your doctor, nurse or pharmacist before following any medical regimen to see if it is safe and effective for you

## 2021-02-09 NOTE — PROGRESS NOTES
COVID-19 Virtual Visit     Assessment/Plan:    Problem List Items Addressed This Visit     None      Visit Diagnoses     Lab test positive for detection of COVID-19 virus    -  Primary         Disposition:     I recommended continued isolation until at least 24 hours have passed since recovery defined as resolution of fever without the use of fever-reducing medications AND improvement in COVID symptoms AND 10 days have passed since onset of symptoms (or 10 days have passed since date of first positive viral diagnostic test for asymptomatic patients)  A/P: Day # 9 since onset of COVID s/s  Covid test was positive  Doing and no s/s at this time    Continue isolation/quarantine, increase fluids, PRN motrin/tylenol, and breathing exercises  May lift restrictions as of 2/10 if remains afebrile and s/s improving    RTC as scheduled for f/u  I have spent 15 minutes directly with the patient  Greater than 50% of this time was spent in counseling/coordination of care regarding: prognosis, risks and benefits of treatment options, instructions for management, patient and family education, importance of treatment compliance, risk factor reductions and impressions          Encounter provider Abigail Goodwin DO    Provider located at [address] [place] [id number]    Recent Visits  Date Type Provider Dept   02/08/21 Telemedicine Abigail Goodwin DO [facility]   02/07/21 Telemedicine Abigail Goodwin DO [facility]   02/06/21 Telemedicine Abigail Goodwin DO [facility]   02/05/21 Telemedicine Abigail Goodwin DO [facility]   02/04/21 Telemedicine Abigail Goodwin DO [facility]   02/03/21 Telemedicine Abigail Goodwin DO [facility]   Showing recent visits within past 7 days and meeting all other requirements     Today's Visits  Date Type Provider Dept   02/09/21 Telemedicine DO [facility]   Showing today's visits and meeting all other requirements     Future Appointments  No visits were found meeting these conditions  Showing future appointments within next 150 days and meeting all other requirements        Patient agrees to participate in a virtual check in via telephone or video visit instead of presenting to the office to address urgent/immediate medical needs  Patient is aware this is a billable service  After connecting through Telephone, the patient was identified by name and date of birth  Rey Hartmann was informed that this was a telemedicine visit and that the exam was being conducted confidentially over secure lines  My office door was closed  No one else was in the room  Rey Hartmann acknowledged consent and understanding of privacy and security of the telemedicine visit  I informed the patient that I have reviewed her record in Epic and presented the opportunity for her to ask any questions regarding the visit today  The patient agreed to participate  It was my intent to perform this visit via video technology but the patient was not able to do a video connection so the visit was completed via audio telephone only  Subjective: Rey Hartmann is a 80 y o  female who has been screened for COVID-19  Symptom change since last report: improving  Patient is currently asymptomatic  Patient denies fever, chills, fatigue, malaise, congestion, rhinorrhea, sore throat, anosmia, loss of taste, cough, shortness of breath, chest tightness, abdominal pain, nausea, vomiting, diarrhea, myalgias and headaches  Priscila Arevalo has been staying home and has isolated themselves in her home  She is taking care to not share personal items and is cleaning all surfaces that are touched often, like counters, tabletops, and doorknobs using household cleaning sprays or wipes  She is wearing a mask when she leaves her room       Date of symptom onset: 1/31/2021  Date of positive COVID-19 PCR: 2/3/2021    Lab Results   Component Value Date    SARSCOV2 Positive (A) 02/03/2021     Past Medical History:   Diagnosis Date    [facility]bro[facility] [address] [facility]     Chronic kidney disease     [facility]     Gout     Hypertension     Osteoporosis      Past Surgical History:   Procedure Laterality Date    APPENDECTOMY      HYSTERECTOMY      TUBAL LIGATION      UTERINE FIBROID SURGERY       Current Outpatient Medications   Medication Sig Dispense Refill    alendronate (FOSAMAX) 70 mg tablet Take 1 tablet by mouth once a week 12 tablet 3    allopurinol (ZYLOPRIM) 100 mg tablet Take 1 tablet (100 mg total) by mouth daily 90 tablet 1    amLODIPine (NORVASC) 5 mg tablet Take 1 tablet (5 mg total) by mouth daily 90 tablet 3    atenolol (TENORMIN) 50 mg tablet Take 1 tablet (50 mg total) by mouth daily 30 tablet 5    atorvastatin (LIPITOR) 40 mg tablet Take 1 tablet (40 mg total) by mouth daily 90 tablet 1    clopidogrel (PLAVIX) 75 mg tablet Take 1 tablet (75 mg total) by mouth daily 90 tablet 1    donepezil (ARICEPT) 5 mg tablet Take 1 tablet (5 mg total) by mouth daily at bedtime 90 tablet 3    ergocalciferol (VITAMIN D2) 50,000 units Take 1 capsule (50,000 Units total) by mouth once a week 12 capsule 3    fluticasone (FLONASE) 50 mcg/act nasal spray Use 1 spray(s) in each nostril once daily 16 g 5    hydrochlorothiazide (HYDRODIURIL) 50 mg tablet Take 1 tablet (50 mg total) by mouth daily 90 tablet 1    lisinopril (ZESTRIL) 40 mg tablet Take 1 tablet (40 mg total) by mouth daily 90 tablet 1    loratadine (CLARITIN) 10 mg tablet Take 1 tablet (10 mg total) by mouth daily 90 tablet 0    Multiple Vitamins-Minerals (MULTI ADULT GUMMIES) CHEW Chew 1 tablet daily      OMEGA-3 FATTY ACIDS-VITAMIN E PO Take 1,000 mg by mouth      pentoxifylline (TRENtal) 400 mg ER tablet TAKE 1 TABLET BY MOUTH THREE TIMES DAILY WITH MEALS 90 tablet 1     No current facility-administered medications for this visit  Allergies   Allergen Reactions    Oxycodone-Acetaminophen Rash       Review of Systems   Constitutional: Negative for activity change, chills, diaphoresis, fatigue and fever  HENT: Negative for congestion, postnasal drip, rhinorrhea, sinus pressure, sinus pain and sore throat  No loss of taste/smell  Respiratory: Negative for cough, chest tightness, shortness of breath and wheezing  Cardiovascular: Negative for chest pain, palpitations and leg swelling  Gastrointestinal: Negative for abdominal pain, constipation, diarrhea, nausea and vomiting  Genitourinary: Negative for difficulty urinating, dysuria and frequency  Musculoskeletal: Negative for arthralgias, gait problem and myalgias  Neurological: Negative for light-headedness and headaches  Psychiatric/Behavioral: Negative for confusion  The patient is not nervous/anxious  Objective:    Vitals:       Physical Exam  Vitals signs and nursing note reviewed  Constitutional:       General: She is not in acute distress  Pulmonary:      Effort: Pulmonary effort is normal  No respiratory distress  Neurological:      General: No focal deficit present  Mental Status: She is alert and oriented to person, place, and time  Mental status is at baseline  Psychiatric:         Mood and Affect: Mood normal          Behavior: Behavior normal          Thought Content: Thought content normal          Judgment: Judgment normal        VIRTUAL VISIT DISCLAIMER    Shagufta ANALI Velasquez acknowledges that she has consented to an online visit or consultation  She understands that the online visit is based solely on information provided by her, and that, in the absence of a face-to-face physical evaluation by the physician, the diagnosis she receives is both limited and provisional in terms of accuracy and completeness  This is not intended to replace a full medical face-to-face evaluation by the physician   Sivakumar Velasquez understands and accepts these terms

## 2021-02-10 ENCOUNTER — TELEPHONE (OUTPATIENT)
Dept: INTERNAL MEDICINE CLINIC | Facility: CLINIC | Age: 81
End: 2021-02-10

## 2021-02-17 ENCOUNTER — TELEPHONE (OUTPATIENT)
Dept: INTERNAL MEDICINE CLINIC | Facility: CLINIC | Age: 81
End: 2021-02-17

## 2021-02-19 DIAGNOSIS — M81.0 AGE-RELATED OSTEOPOROSIS WITHOUT CURRENT PATHOLOGICAL FRACTURE: ICD-10-CM

## 2021-02-19 RX ORDER — ALENDRONATE SODIUM 70 MG/1
70 TABLET ORAL WEEKLY
Qty: 12 TABLET | Refills: 3 | Status: SHIPPED | OUTPATIENT
Start: 2021-02-19 | End: 2021-05-14 | Stop reason: SDUPTHER

## 2021-02-26 ENCOUNTER — TELEPHONE (OUTPATIENT)
Dept: INTERNAL MEDICINE CLINIC | Facility: CLINIC | Age: 81
End: 2021-02-26

## 2021-02-26 DIAGNOSIS — I10 ESSENTIAL HYPERTENSION: ICD-10-CM

## 2021-02-26 DIAGNOSIS — I63.9 CEREBROVASCULAR ACCIDENT (CVA), UNSPECIFIED MECHANISM (HCC): ICD-10-CM

## 2021-02-26 DIAGNOSIS — M1A.9XX0 CHRONIC GOUT WITHOUT TOPHUS, UNSPECIFIED CAUSE, UNSPECIFIED SITE: ICD-10-CM

## 2021-02-26 DIAGNOSIS — G45.9 TIA (TRANSIENT ISCHEMIC ATTACK): ICD-10-CM

## 2021-02-26 DIAGNOSIS — E55.9 VITAMIN D DEFICIENCY: ICD-10-CM

## 2021-02-26 RX ORDER — PENTOXIFYLLINE 400 MG/1
400 TABLET, EXTENDED RELEASE ORAL
Qty: 90 TABLET | Refills: 1 | Status: SHIPPED | OUTPATIENT
Start: 2021-02-26 | End: 2021-05-14 | Stop reason: SDUPTHER

## 2021-02-26 RX ORDER — LISINOPRIL 40 MG/1
40 TABLET ORAL DAILY
Qty: 90 TABLET | Refills: 1 | Status: SHIPPED | OUTPATIENT
Start: 2021-02-26 | End: 2021-05-14 | Stop reason: SDUPTHER

## 2021-02-26 RX ORDER — ERGOCALCIFEROL 1.25 MG/1
50000 CAPSULE ORAL WEEKLY
Qty: 12 CAPSULE | Refills: 3 | Status: SHIPPED | OUTPATIENT
Start: 2021-02-26 | End: 2021-05-14 | Stop reason: SDUPTHER

## 2021-02-26 RX ORDER — ALLOPURINOL 100 MG/1
100 TABLET ORAL DAILY
Qty: 90 TABLET | Refills: 1 | Status: SHIPPED | OUTPATIENT
Start: 2021-02-26 | End: 2021-05-14 | Stop reason: SDUPTHER

## 2021-02-26 RX ORDER — ATORVASTATIN CALCIUM 40 MG/1
40 TABLET, FILM COATED ORAL DAILY
Qty: 90 TABLET | Refills: 1 | Status: SHIPPED | OUTPATIENT
Start: 2021-02-26 | End: 2021-06-07

## 2021-02-26 RX ORDER — HYDROCHLOROTHIAZIDE 50 MG/1
50 TABLET ORAL DAILY
Qty: 90 TABLET | Refills: 1 | Status: SHIPPED | OUTPATIENT
Start: 2021-02-26 | End: 2021-05-14 | Stop reason: SDUPTHER

## 2021-02-26 NOTE — TELEPHONE ENCOUNTER
Pt needs rx for pentoxifylline (TRENtal) 400 mg ER tablet, ergocalciferol (VITAMIN D2) 50,000 units, hydrochlorothiazide (HYDRODIURIL) 50 mg tablet allopurinol (ZYLOPRIM) 100 mg tablet, atorvastatin (LIPITOR) 40 mg tablet  ,lisinopril (ZESTRIL) 40 mg tablet,  All 90 day supply, pls send to [facility]

## 2021-03-15 ENCOUNTER — OFFICE VISIT (OUTPATIENT)
Dept: INTERNAL MEDICINE CLINIC | Facility: CLINIC | Age: 81
End: 2021-03-15
Payer: COMMERCIAL

## 2021-03-15 VITALS
BODY MASS INDEX: 23.9 KG/M2 | HEART RATE: 84 BPM | DIASTOLIC BLOOD PRESSURE: 70 MMHG | OXYGEN SATURATION: 98 % | WEIGHT: 140 LBS | SYSTOLIC BLOOD PRESSURE: 144 MMHG | TEMPERATURE: 98 F | HEIGHT: 64 IN

## 2021-03-15 DIAGNOSIS — I73.9 PERIPHERAL VASCULAR DISEASE (HCC): ICD-10-CM

## 2021-03-15 DIAGNOSIS — I67.2 CEREBRAL ATHEROSCLEROSIS: ICD-10-CM

## 2021-03-15 DIAGNOSIS — M1A.9XX0 CHRONIC GOUT WITHOUT TOPHUS, UNSPECIFIED CAUSE, UNSPECIFIED SITE: ICD-10-CM

## 2021-03-15 DIAGNOSIS — E78.2 MIXED HYPERLIPIDEMIA: ICD-10-CM

## 2021-03-15 DIAGNOSIS — Z13.31 NEGATIVE DEPRESSION SCREENING: ICD-10-CM

## 2021-03-15 DIAGNOSIS — I10 ESSENTIAL HYPERTENSION: Primary | ICD-10-CM

## 2021-03-15 DIAGNOSIS — M81.0 AGE-RELATED OSTEOPOROSIS WITHOUT CURRENT PATHOLOGICAL FRACTURE: ICD-10-CM

## 2021-03-15 DIAGNOSIS — Z00.00 MEDICARE ANNUAL WELLNESS VISIT, SUBSEQUENT: ICD-10-CM

## 2021-03-15 DIAGNOSIS — Z86.16 HISTORY OF COVID-19: ICD-10-CM

## 2021-03-15 DIAGNOSIS — I73.9 INTERMITTENT CLAUDICATION (HCC): ICD-10-CM

## 2021-03-15 DIAGNOSIS — M35.3 POLYMYALGIA RHEUMATICA (HCC): ICD-10-CM

## 2021-03-15 DIAGNOSIS — N18.30 STAGE 3 CHRONIC KIDNEY DISEASE, UNSPECIFIED WHETHER STAGE 3A OR 3B CKD (HCC): ICD-10-CM

## 2021-03-15 PROCEDURE — 1125F AMNT PAIN NOTED PAIN PRSNT: CPT | Performed by: INTERNAL MEDICINE

## 2021-03-15 PROCEDURE — 3078F DIAST BP <80 MM HG: CPT | Performed by: INTERNAL MEDICINE

## 2021-03-15 PROCEDURE — 1160F RVW MEDS BY RX/DR IN RCRD: CPT | Performed by: INTERNAL MEDICINE

## 2021-03-15 PROCEDURE — 3077F SYST BP >= 140 MM HG: CPT | Performed by: INTERNAL MEDICINE

## 2021-03-15 PROCEDURE — 1170F FXNL STATUS ASSESSED: CPT | Performed by: INTERNAL MEDICINE

## 2021-03-15 PROCEDURE — 3725F SCREEN DEPRESSION PERFORMED: CPT | Performed by: INTERNAL MEDICINE

## 2021-03-15 PROCEDURE — 99214 OFFICE O/P EST MOD 30 MIN: CPT | Performed by: INTERNAL MEDICINE

## 2021-03-15 NOTE — PATIENT INSTRUCTIONS
Chronic Hypertension   AMBULATORY CARE:   Hypertension  is high blood pressure  Your blood pressure is the force of your blood moving against the walls of your arteries  Hypertension causes your blood pressure to get so high that your heart has to work much harder than normal  This can damage your heart  Even if you have hypertension for years, lifestyle changes, medicines, or both can help bring your blood pressure to normal   Call 911 for any of the following:   · You have chest pain  · You have any of the following signs of a heart attack:      ? Squeezing, pressure, or pain in your chest    ? You may  also have any of the following:     § Discomfort or pain in your back, neck, jaw, stomach, or arm    § Shortness of breath    § Nausea or vomiting    § Lightheadedness or a sudden cold sweat    · You become confused or have difficulty speaking  · You suddenly feel lightheaded or have trouble breathing  Seek care immediately if:   · You have a severe headache or vision loss  · You have weakness in an arm or leg  Contact your healthcare provider if:   · You feel faint, dizzy, confused, or drowsy  · You have been taking your blood pressure medicine but your pressure is higher than your provider says it should be  · You have questions or concerns about your condition or care  Treatment for chronic hypertension  may include medicine to lower your blood pressure and cholesterol levels  A low cholesterol level helps prevent heart disease and makes it easier to control your blood pressure  Heart disease can make your blood pressure harder to control  You may also need to make lifestyle changes  What you need to know about the stages of hypertension:       · Normal blood pressure is 119/79 or lower   Your healthcare provider may only check your blood pressure each year if it stays at a normal level  · Elevated blood pressure is 120/79 to 129/79   This is sometimes called prehypertension   Your healthcare provider may suggest lifestyle changes to help lower your blood pressure to a normal level  He or she may then check it again in 3 to 6 months  · Stage 1 hypertension is 130/80  to 139/89   Your provider may recommend lifestyle changes, medication, and checks every 3 to 6 months until your blood pressure is controlled  · Stage 2 hypertension is 140/90 or higher   Your provider will recommend lifestyle changes and have you take 2 kinds of hypertension medicines  You will also need to have your blood pressure checked monthly until it is controlled  Manage chronic hypertension:   · Check your blood pressure at home  Avoid smoking, caffeine, and exercise at least 30 minutes before checking your blood pressure  Sit and rest for 5 minutes before you take your blood pressure  Extend your arm and support it on a flat surface  Your arm should be at the same level as your heart  Follow the directions that came with your blood pressure monitor  Check your blood pressure 2 times, 1 minute apart, before you take your medicine in the morning  Also check your blood pressure before your evening meal  Keep a record of your readings and bring it to your follow-up visits  Ask your healthcare provider what your blood pressure should be  · Manage any other health conditions you have  Health conditions such as diabetes can increase your risk for hypertension  Follow your healthcare provider's instructions and take all your medicines as directed  Talk to your healthcare provider about any new health conditions you have recently developed  · Ask about all medicines  Certain medicines can increase your blood pressure  Examples include oral birth control pills, decongestants, herbal supplements, and NSAIDs, such as ibuprofen  Your healthcare provider can tell you which medicines are safe for you to take  This includes prescription and over-the-counter medicines      Lifestyle changes you can make to lower your blood pressure: Your provider may want you to make more lifestyle changes if you are having trouble controlling your blood pressure  This may feel difficult over time, especially if you think you are making good changes but your pressure is still high  It might help to focus on one new change at a time  For example, try to add 1 more day of exercise, or exercise for an extra 10 minutes on 2 days  Small changes can make a big difference  Your healthcare provider can also refer you to specialists such as a dietitian who can help you make small changes  · Limit sodium (salt) as directed  Too much sodium can affect your fluid balance  Check labels to find low-sodium or no-salt-added foods  Some low-sodium foods use potassium salts for flavor  Too much potassium can also cause health problems  Your healthcare provider will tell you how much sodium and potassium are safe for you to have in a day  He or she may recommend that you limit sodium to 2,300 mg a day  · Follow the meal plan recommended by your healthcare provider  A dietitian or your provider can give you more information on low-sodium plans or the DASH (Dietary Approaches to Stop Hypertension) eating plan  The DASH plan is low in sodium, unhealthy fats, and total fat  It is high in potassium, calcium, and fiber  · Exercise to maintain a healthy weight  Exercise at least 30 minutes per day, on most days of the week  This will help decrease your blood pressure  Ask your healthcare provider about the best exercise plan for you  · Decrease stress  This may help lower your blood pressure  Learn ways to relax, such as deep breathing or listening to music  · Limit alcohol as directed  Alcohol can increase your blood pressure  A drink of alcohol is 12 ounces of beer, 5 ounces of wine, or 1½ ounces of liquor  · Do not smoke    Nicotine and other chemicals in cigarettes and cigars can increase your blood pressure and also cause lung damage  Ask your healthcare provider for information if you currently smoke and need help to quit  E-cigarettes or smokeless tobacco still contain nicotine  Talk to your healthcare provider before you use these products  Follow up with your healthcare provider as directed: You will need to return to have your blood pressure checked and to have other lab tests done  Write down your questions so you remember to ask them during your visits  © Copyright [address] Information is for End User's use only and may not be sold, redistributed or otherwise used for commercial purposes  All illustrations and images included in CareNotes® are the copyrighted property of A D A M , Inc  or [employer]   The above information is an  only  It is not intended as medical advice for individual conditions or treatments  Talk to your doctor, nurse or pharmacist before following any medical regimen to see if it is safe and effective for you  Medicare Preventive Visit Patient Instructions  Thank you for completing your Welcome to Medicare Visit or Medicare Annual Wellness Visit today  Your next wellness visit will be due in one year (3/16/2022)  The screening/preventive services that you may require over the next 5-10 years are detailed below  Some tests may not apply to you based off risk factors and/or age  Screening tests ordered at today's visit but not completed yet may show as past due  Also, please note that scanned in results may not display below    Preventive Screenings:  Service Recommendations Previous Testing/Comments   Colorectal Cancer Screening  * Colonoscopy    * Fecal Occult Blood Test (FOBT)/Fecal Immunochemical Test (FIT)  * Fecal DNA/Cologuard Test  * Flexible Sigmoidoscopy Age: 54-65 years old   Colonoscopy: every 10 years (may be performed more frequently if at higher risk)  OR  FOBT/FIT: every 1 year  OR  Cologuard: every 3 years  OR  Sigmoidoscopy: every 5 years  Screening may be recommended earlier than age 48 if at higher risk for colorectal cancer  Also, an individualized decision between you and your healthcare provider will decide whether screening between the ages of 74-80 would be appropriate  Colonoscopy: Not on file  FOBT/FIT: 04/18/2018  Cologuard: Not on file  Sigmoidoscopy: Not on file          Breast Cancer Screening Age: 36 years old  Frequency: every 1-2 years  Not required if history of left and right mastectomy Mammogram: 09/25/2013        Cervical Cancer Screening Between the ages of 21-29, pap smear recommended once every 3 years  Between the ages of 33-67, can perform pap smear with HPV co-testing every 5 years  Recommendations may differ for women with a history of total hysterectomy, cervical cancer, or abnormal pap smears in past  Pap Smear: Not on file    Screening Not Indicated   Hepatitis C Screening Once for adults born between 1945 and 1965  More frequently in patients at high risk for Hepatitis C Hep C Antibody: Not on file        Diabetes Screening 1-2 times per year if you're at risk for diabetes or have pre-diabetes Fasting glucose: 88 mg/dL   A1C: 5 3 %    Screening Current   Cholesterol Screening Once every 5 years if you don't have a lipid disorder  May order more often based on risk factors  Lipid panel: 11/16/2020    Screening Not Indicated  History Lipid Disorder     Other Preventive Screenings Covered by Medicare:  1  Abdominal Aortic Aneurysm (AAA) Screening: covered once if your at risk  You're considered to be at risk if you have a family history of AAA    2  Lung Cancer Screening: covers low dose CT scan once per year if you meet all of the following conditions: (1) Age 50-69; (2) No signs or symptoms of lung cancer; (3) Current smoker or have quit smoking within the last 15 years; (4) You have a tobacco smoking history of at least 30 pack years (packs per day multiplied by number of years you smoked); (5) You get a written order from a healthcare provider  3  Glaucoma Screening: covered annually if you're considered high risk: (1) You have diabetes OR (2) Family history of glaucoma OR (3)  aged 48 and older OR (3)  American aged 72 and older  3  Osteoporosis Screening: covered every 2 years if you meet one of the following conditions: (1) You're estrogen deficient and at risk for osteoporosis based off medical history and other findings; (2) Have a vertebral abnormality; (3) On glucocorticoid therapy for more than 3 months; (4) Have primary hyperparathyroidism; (5) On osteoporosis medications and need to assess response to drug therapy  · Last bone density test (DXA Scan): Not on file  5  HIV Screening: covered annually if you're between the age of 12-76  Also covered annually if you are younger than 13 and older than 72 with risk factors for HIV infection  For pregnant patients, it is covered up to 3 times per pregnancy  Immunizations:  Immunization Recommendations   Influenza Vaccine Annual influenza vaccination during flu season is recommended for all persons aged >= 6 months who do not have contraindications   Pneumococcal Vaccine (Prevnar and Pneumovax)  * Prevnar = PCV13  * Pneumovax = PPSV23   Adults 25-60 years old: 1-3 doses may be recommended based on certain risk factors  Adults 72 years old: Prevnar (PCV13) vaccine recommended followed by Pneumovax (PPSV23) vaccine  If already received PPSV23 since turning 65, then PCV13 recommended at least one year after PPSV23 dose  Hepatitis B Vaccine 3 dose series if at intermediate or high risk (ex: diabetes, end stage renal disease, liver disease)   Tetanus (Td) Vaccine - COST NOT COVERED BY MEDICARE PART B Following completion of primary series, a booster dose should be given every 10 years to maintain immunity against tetanus  Td may also be given as tetanus wound prophylaxis     Tdap Vaccine - COST NOT COVERED BY MEDICARE PART B Recommended at least once for all adults  For pregnant patients, recommended with each pregnancy  Shingles Vaccine (Shingrix) - COST NOT COVERED BY MEDICARE PART B  2 shot series recommended in those aged 48 and above     Health Maintenance Due:  There are no preventive care reminders to display for this patient  Immunizations Due:      Topic Date Due    COVID-19 Vaccine (1 of 2) 02/05/1956     Advance Directives   What are advance directives? Advance directives are legal documents that state your wishes and plans for medical care  These plans are made ahead of time in case you lose your ability to make decisions for yourself  Advance directives can apply to any medical decision, such as the treatments you want, and if you want to donate organs  What are the types of advance directives? There are many types of advance directives, and each state has rules about how to use them  You may choose a combination of any of the following:  · Living will: This is a written record of the treatment you want  You can also choose which treatments you do not want, which to limit, and which to stop at a certain time  This includes surgery, medicine, IV fluid, and tube feedings  · Durable power of  for healthcare [facility]): This is a written record that states who you want to make healthcare choices for you when you are unable to make them for yourself  This person, called a proxy, is usually a family member or a friend  You may choose more than 1 proxy  · Do not resuscitate (DNR) order:  A DNR order is used in case your heart stops beating or you stop breathing  It is a request not to have certain forms of treatment, such as CPR  A DNR order may be included in other types of advance directives  · Medical directive: This covers the care that you want if you are in a coma, near death, or unable to make decisions for yourself  You can list the treatments you want for each condition   Treatment may include pain medicine, surgery, blood transfusions, dialysis, IV or tube feedings, and a ventilator (breathing machine)  · Values history: This document has questions about your views, beliefs, and how you feel and think about life  This information can help others choose the care that you would choose  Why are advance directives important? An advance directive helps you control your care  Although spoken wishes may be used, it is better to have your wishes written down  Spoken wishes can be misunderstood, or not followed  Treatments may be given even if you do not want them  An advance directive may make it easier for your family to make difficult choices about your care  © Copyright [employer] 2018 Information is for End User's use only and may not be sold, redistributed or otherwise used for commercial purposes   All illustrations and images included in CareNotes® are the copyrighted property of A D A M , Inc  or [address] [place] [address]

## 2021-03-15 NOTE — PROGRESS NOTES
Assessment and Plan:     Problem List Items Addressed This Visit        Cardiovascular and Mediastinum    Hypertension - Primary    Cerebral atherosclerosis    Peripheral vascular disease ([facility] )       Musculoskeletal and Integument    Osteoporosis       Genitourinary    CKD (chronic kidney disease), stage III       Other    Gout    Mixed hyperlipidemia    Polymyalgia rheumatica (HCC)    Intermittent claudication ([facility] )           Preventive health issues were discussed with patient, and age appropriate screening tests were ordered as noted in patient's After Visit Summary  Personalized health advice and appropriate referrals for health education or preventive services given if needed, as noted in patient's After Visit Summary       History of Present Illness:     Patient presents for Medicare Annual Wellness visit    Patient Care Team:  Rissa Wylie DO as PCP - General (Internal Medicine)  Rissa Wylie DO as PCP - [address] [place], [place] [id number] ([facility])     Problem List:     Patient Active Problem List   Diagnosis    CKD (chronic kidney disease), stage III    Gout    Osteoporosis    Hypertension    Mixed hyperlipidemia    Cerebral atherosclerosis    Peripheral vascular disease ([facility] )    Polymyalgia rheumatica (HCC)    Intermittent claudication (HCC)      Past Medical and Surgical History:     Past Medical History:   Diagnosis Date    Acute CVA (cerebrovascular accident) ([facility] )     Chronic kidney disease     Dementia ([facility] )     Gout     Hypertension     Osteoporosis      Past Surgical History:   Procedure Laterality Date    APPENDECTOMY      HYSTERECTOMY      TUBAL LIGATION      UTERINE FIBROID SURGERY        Family History:     Family History   Problem Relation Age of Onset    Stroke Mother     Other Father         Coal workers' pneumoconiosis    Hypertension Father       Social History:        Social History     Socioeconomic History    Marital status:      Spouse name: Not on file    Number of children: 4    Years of education: Not on file    Highest education level: Not on file   Occupational History    Occupation: Store Owner   Social Needs    Financial resource strain: Not on file    Food insecurity     Worry: Not on file     Inability: Not on file   [employer] needs     Medical: Not on file     Non-medical: Not on file   Tobacco Use    Smoking status: Former Smoker    Smokeless tobacco: Never Used   Substance and Sexual Activity    Alcohol use: No     Frequency: Never     Drinks per session: Patient refused     Binge frequency: Never    Drug use: No    Sexual activity: Not Currently   Lifestyle    Physical activity     Days per week: Not on file     Minutes per session: Not on file    Stress: Not on file   Relationships    Social connections     Talks on phone: Not on file     Gets together: Not on file     Attends Sikh service: Not on file     Active member of club or organization: Not on file     Attends meetings of clubs or organizations: Not on file     Relationship status: Not on file    Intimate partner violence     Fear of current or ex partner: Not on file     Emotionally abused: Not on file     Physically abused: Not on file     Forced sexual activity: Not on file   Other Topics Concern    Not on file   Social History Narrative    Lives with adult children            Medications and Allergies:     Current Outpatient Medications   Medication Sig Dispense Refill    alendronate (FOSAMAX) 70 mg tablet Take 1 tablet (70 mg total) by mouth once a week 12 tablet 3    allopurinol (ZYLOPRIM) 100 mg tablet Take 1 tablet (100 mg total) by mouth daily 90 tablet 1    amLODIPine (NORVASC) 5 mg tablet Take 1 tablet (5 mg total) by mouth daily 90 tablet 3    atenolol (TENORMIN) 50 mg tablet Take 1 tablet (50 mg total) by mouth daily 30 tablet 5    atorvastatin (LIPITOR) 40 mg tablet Take 1 tablet (40 mg total) by mouth daily 90 tablet 1    clopidogrel (PLAVIX) 75 mg tablet Take 1 tablet (75 mg total) by mouth daily 90 tablet 1    donepezil (ARICEPT) 5 mg tablet Take 1 tablet (5 mg total) by mouth daily at bedtime 90 tablet 3    ergocalciferol (VITAMIN D2) 50,000 units Take 1 capsule (50,000 Units total) by mouth once a week 12 capsule 3    fluticasone (FLONASE) 50 mcg/act nasal spray Use 1 spray(s) in each nostril once daily 16 g 5    hydrochlorothiazide (HYDRODIURIL) 50 mg tablet Take 1 tablet (50 mg total) by mouth daily 90 tablet 1    lisinopril (ZESTRIL) 40 mg tablet Take 1 tablet (40 mg total) by mouth daily 90 tablet 1    loratadine (CLARITIN) 10 mg tablet Take 1 tablet (10 mg total) by mouth daily 90 tablet 0    Multiple Vitamins-Minerals (MULTI ADULT GUMMIES) CHEW Chew 1 tablet daily      OMEGA-3 FATTY ACIDS-VITAMIN E PO Take 1,000 mg by mouth      pentoxifylline (TRENtal) 400 mg ER tablet Take 1 tablet (400 mg total) by mouth 3 (three) times a day with meals 90 tablet 1     No current facility-administered medications for this visit  Allergies   Allergen Reactions    Oxycodone-Acetaminophen Rash      Immunizations:     Immunization History   Administered Date(s) Administered    INFLUENZA 11/05/1997, 10/20/2005, 11/02/2006, 10/19/2007, 10/25/2008, 10/10/2009, 10/23/2010, 09/10/2011, 03/27/2014, 09/24/2014, 09/23/2015, 09/28/2016, 10/02/2017    Influenza Split High Dose Preservative Free IM 09/23/2015, 09/28/2016, 10/02/2017    Influenza Whole 11/05/1997    Influenza, high dose seasonal 0 7 mL 09/18/2019, 09/08/2020    Influenza, seasonal, injectable 10/20/2005, 11/02/2006, 10/19/2007, 10/25/2008, 10/10/2009, 10/23/2010, 09/10/2011    Pneumococcal Conjugate 13-Valent 09/23/2015    Pneumococcal Polysaccharide PPV23 10/20/2005, 04/16/2012    Td (adult), adsorbed 11/05/1997, 10/20/2005, 02/05/2007    Tdap 07/01/2013      Health Maintenance: There are no preventive care reminders to display for this patient        Topic Date Due    COVID-19 Vaccine (1 of 2) 02/05/1956      Medicare Health Risk Assessment:     There were no vitals taken for this visit  Health Risk Assessment:   Patient rates overall health as good  Patient feels that their physical health rating is slightly worse  Patient is satisfied with their life  Eyesight was rated as same  Hearing was rated as same  Patient feels that their emotional and mental health rating is same  Patients states they are never, rarely angry  Patient states they are sometimes unusually tired/fatigued  Pain experienced in the last 7 days has been none  Patient states that she has experienced no weight loss or gain in last 6 months  Depression Screening:   PHQ-2 Score: 0      Fall Risk Screening: In the past year, patient has experienced: no history of falling in past year      Urinary Incontinence Screening:   Patient has not leaked urine accidently in the last six months  Home Safety:  Patient has trouble with stairs inside or outside of their home  Patient has working smoke alarms and has working carbon monoxide detector  Home safety hazards include: none  Medications:   Patient is currently taking over-the-counter supplements  OTC medications include: see medication list  Patient is able to manage medications  Activities of Daily Living (ADLs)/Instrumental Activities of Daily Living (IADLs):   Walk and transfer into and out of bed and chair?: Yes  Dress and groom yourself?: Yes    Bathe or shower yourself?: Yes    Feed yourself? Yes  Do your laundry/housekeeping?: Yes  Manage your money, pay your bills and track your expenses?: Yes  Make your own meals?: Yes    Do your own shopping?: Yes    Previous Hospitalizations:   Any hospitalizations or ED visits within the last 12 months?: No      Advance Care Planning:   Living will: Yes    Durable POA for healthcare:  Yes    Advanced directive: Yes    Advanced directive counseling given: Yes    Five wishes given: No    Patient declined ACP directive: No    End of Life Decisions reviewed with patient: Yes    Provider agrees with end of life decisions: Yes      Cognitive Screening:   Provider or family/friend/caregiver concerned regarding cognition?: No    PREVENTIVE SCREENINGS      Cardiovascular Screening:    General: Screening Not Indicated and History Lipid Disorder      Diabetes Screening:     General: Screening Current      Cervical Cancer Screening:    General: Screening Not Indicated      Osteoporosis Screening:    General: Screening Not Indicated and History Osteoporosis      Lung Cancer Screening:     General: Screening Not Indicated    Screening, Brief Intervention, and Referral to Treatment (SBIRT)    Screening  Typical number of drinks in a day: 0  Typical number of drinks in a week: 0  Interpretation: Low risk drinking behavior      AUDIT-C Screenin) How often did you have a drink containing alcohol in the past year? never  2) How many drinks did you have on a typical day when you were drinking in the past year? never  3) How often did you have 6 or more drinks on one occasion in the past year? never    AUDIT-C Score: 0  Interpretation: Score 0-2 (female): Negative screen for alcohol misuse      Lurene Floor, DO

## 2021-03-15 NOTE — PROGRESS NOTES
Assessment/Plan:  Problem List Items Addressed This Visit        Cardiovascular and Mediastinum    Cerebral atherosclerosis    Hypertension - Primary    Peripheral vascular disease ([facility] )       Musculoskeletal and Integument    Osteoporosis       Genitourinary    CKD (chronic kidney disease), stage III       Other    Gout    History of COVID-19    Intermittent claudication ([facility] )    Mixed hyperlipidemia    Polymyalgia rheumatica ([facility] )      Other Visit Diagnoses     Medicare annual wellness visit, subsequent        Negative depression screening               Diagnoses and all orders for this visit:    Essential hypertension    Cerebral atherosclerosis    Peripheral vascular disease ([facility] )    Age-related osteoporosis without current pathological fracture    Stage 3 chronic kidney disease, unspecified whether stage 3a or 3b CKD    Chronic gout without tophus, unspecified cause, unspecified site    Mixed hyperlipidemia    Polymyalgia rheumatica (HCC)    Intermittent claudication ([facility] )    Medicare annual wellness visit, subsequent    Negative depression screening    History of COVID-19        No problem-specific Assessment & Plan notes found for this encounter  A/P: Doing well and labs are up to date  Continue current treatment and modify risks  RTC four months for routine  Discussed COVID vaccine and recommend she should get it  Subjective:      Patient ID: Dominik Abraham is a 80 y o  female  WF RTC for f/u htn, PAD, etc  Doing well and no new issues  No sequelae related to her COVID infection  Remains active w/o difficulty and no falls  No stroke like events  No gout attacks  PMR is controlled  No claudication reported  Labs are up to date  The following portions of the patient's history were reviewed and updated as appropriate:   She has a past medical history of Acute CVA (cerebrovascular accident) ([facility] ), Chronic kidney disease, Dementia ([facility] ), Gout, Hypertension, and Osteoporosis  ,  does not have any pertinent problems on file  ,   has a past surgical history that includes Appendectomy; Hysterectomy; Tubal ligation; and Uterine fibroid surgery  ,  family history includes Hypertension in her father; Other in her father; Stroke in her mother  ,   reports that she has quit smoking  She has never used smokeless tobacco  She reports that she does not drink alcohol or use drugs  ,  is allergic to oxycodone-acetaminophen     Current Outpatient Medications   Medication Sig Dispense Refill    alendronate (FOSAMAX) 70 mg tablet Take 1 tablet (70 mg total) by mouth once a week 12 tablet 3    allopurinol (ZYLOPRIM) 100 mg tablet Take 1 tablet (100 mg total) by mouth daily 90 tablet 1    amLODIPine (NORVASC) 5 mg tablet Take 1 tablet (5 mg total) by mouth daily 90 tablet 3    atenolol (TENORMIN) 50 mg tablet Take 1 tablet (50 mg total) by mouth daily 30 tablet 5    atorvastatin (LIPITOR) 40 mg tablet Take 1 tablet (40 mg total) by mouth daily 90 tablet 1    clopidogrel (PLAVIX) 75 mg tablet Take 1 tablet (75 mg total) by mouth daily 90 tablet 1    donepezil (ARICEPT) 5 mg tablet Take 1 tablet (5 mg total) by mouth daily at bedtime 90 tablet 3    ergocalciferol (VITAMIN D2) 50,000 units Take 1 capsule (50,000 Units total) by mouth once a week 12 capsule 3    fluticasone (FLONASE) 50 mcg/act nasal spray Use 1 spray(s) in each nostril once daily 16 g 5    hydrochlorothiazide (HYDRODIURIL) 50 mg tablet Take 1 tablet (50 mg total) by mouth daily 90 tablet 1    lisinopril (ZESTRIL) 40 mg tablet Take 1 tablet (40 mg total) by mouth daily 90 tablet 1    loratadine (CLARITIN) 10 mg tablet Take 1 tablet (10 mg total) by mouth daily 90 tablet 0    Multiple Vitamins-Minerals (MULTI ADULT GUMMIES) CHEW Chew 1 tablet daily      OMEGA-3 FATTY ACIDS-VITAMIN E PO Take 1,000 mg by mouth      pentoxifylline (TRENtal) 400 mg ER tablet Take 1 tablet (400 mg total) by mouth 3 (three) times a day with meals 90 tablet 1     No current facility-administered medications for this visit  Review of Systems   Constitutional: Negative for activity change, chills, diaphoresis, fatigue and fever  HENT: Negative  Eyes: Negative for visual disturbance  Respiratory: Negative for cough, chest tightness, shortness of breath and wheezing  Cardiovascular: Negative for chest pain, palpitations and leg swelling  Gastrointestinal: Negative for abdominal pain, constipation, diarrhea, nausea and vomiting  Endocrine: Negative for cold intolerance and heat intolerance  Genitourinary: Negative for difficulty urinating, dysuria and frequency  Musculoskeletal: Negative for arthralgias, gait problem and myalgias  Neurological: Negative for dizziness, seizures, syncope, weakness, light-headedness and headaches  Psychiatric/Behavioral: Negative for confusion, dysphoric mood and sleep disturbance  The patient is not nervous/anxious  PHQ-9 Depression Screening    PHQ-9:   Frequency of the following problems over the past two weeks:      Little interest or pleasure in doing things: 0 - not at all  Feeling down, depressed, or hopeless: 0 - not at all  PHQ-2 Score: 0        Objective:  Vitals:    03/15/21 1017   BP: 144/70   Pulse: 84   Temp: 98 °F (36 7 °C)   SpO2: 98%   Weight: 63 5 kg (140 lb)   Height: 5' 4" (1 626 m)     Body mass index is 24 03 kg/m²  Physical Exam  Vitals signs and nursing note reviewed  Constitutional:       General: She is not in acute distress  Appearance: Normal appearance  She is not ill-appearing  HENT:      Head: Normocephalic and atraumatic  Mouth/Throat:      Mouth: Mucous membranes are moist    Eyes:      Extraocular Movements: Extraocular movements intact  Conjunctiva/sclera: Conjunctivae normal       Pupils: Pupils are equal, round, and reactive to light  Neck:      Musculoskeletal: Neck supple  Vascular: No carotid bruit     Cardiovascular:      Rate and Rhythm: Normal rate and regular rhythm  Heart sounds: Normal heart sounds  Pulmonary:      Effort: Pulmonary effort is normal  No respiratory distress  Breath sounds: Normal breath sounds  No wheezing or rales  Abdominal:      General: Bowel sounds are normal  There is no distension  Palpations: Abdomen is soft  Tenderness: There is no abdominal tenderness  Musculoskeletal:      Right lower leg: No edema  Left lower leg: No edema  Neurological:      General: No focal deficit present  Mental Status: She is alert and oriented to person, place, and time  Mental status is at baseline  Psychiatric:         Mood and Affect: Mood normal          Behavior: Behavior normal          Thought Content:  Thought content normal          Judgment: Judgment normal

## 2021-04-08 DIAGNOSIS — I10 ESSENTIAL HYPERTENSION: ICD-10-CM

## 2021-04-08 RX ORDER — AMLODIPINE BESYLATE 5 MG/1
5 TABLET ORAL DAILY
Qty: 90 TABLET | Refills: 3 | Status: SHIPPED | OUTPATIENT
Start: 2021-04-08 | End: 2021-05-14 | Stop reason: SDUPTHER

## 2021-05-03 ENCOUNTER — OFFICE VISIT (OUTPATIENT)
Dept: INTERNAL MEDICINE CLINIC | Facility: CLINIC | Age: 81
End: 2021-05-03
Payer: COMMERCIAL

## 2021-05-03 VITALS
HEART RATE: 82 BPM | SYSTOLIC BLOOD PRESSURE: 154 MMHG | RESPIRATION RATE: 14 BRPM | BODY MASS INDEX: 23.76 KG/M2 | OXYGEN SATURATION: 93 % | HEIGHT: 64 IN | TEMPERATURE: 99.8 F | WEIGHT: 139.2 LBS | DIASTOLIC BLOOD PRESSURE: 66 MMHG

## 2021-05-03 DIAGNOSIS — B02.9 HERPES ZOSTER WITHOUT COMPLICATION: Primary | ICD-10-CM

## 2021-05-03 DIAGNOSIS — Z00.00 MEDICARE ANNUAL WELLNESS VISIT, SUBSEQUENT: ICD-10-CM

## 2021-05-03 DIAGNOSIS — I63.9 CEREBROVASCULAR ACCIDENT (CVA), UNSPECIFIED MECHANISM (HCC): ICD-10-CM

## 2021-05-03 DIAGNOSIS — N18.31 STAGE 3A CHRONIC KIDNEY DISEASE (HCC): ICD-10-CM

## 2021-05-03 PROCEDURE — 99213 OFFICE O/P EST LOW 20 MIN: CPT | Performed by: INTERNAL MEDICINE

## 2021-05-03 PROCEDURE — 3288F FALL RISK ASSESSMENT DOCD: CPT | Performed by: INTERNAL MEDICINE

## 2021-05-03 PROCEDURE — G0439 PPPS, SUBSEQ VISIT: HCPCS | Performed by: INTERNAL MEDICINE

## 2021-05-03 RX ORDER — PREDNISONE 20 MG/1
TABLET ORAL
Qty: 20 TABLET | Refills: 0 | Status: SHIPPED | OUTPATIENT
Start: 2021-05-03 | End: 2021-06-07

## 2021-05-03 RX ORDER — VALACYCLOVIR HYDROCHLORIDE 1 G/1
1000 TABLET, FILM COATED ORAL 2 TIMES DAILY
Qty: 10 TABLET | Refills: 0 | Status: SHIPPED | OUTPATIENT
Start: 2021-05-03 | End: 2021-05-19 | Stop reason: SDUPTHER

## 2021-05-03 RX ORDER — CLOPIDOGREL BISULFATE 75 MG/1
TABLET ORAL
Qty: 90 TABLET | Refills: 1 | Status: SHIPPED | OUTPATIENT
Start: 2021-05-03 | End: 2021-09-07 | Stop reason: SDUPTHER

## 2021-05-03 NOTE — PROGRESS NOTES
Assessment/Plan:  The patient has shingles and we will start Valacyclovir  And Prednisone  Problem List Items Addressed This Visit        Genitourinary    CKD (chronic kidney disease), stage III ([address] )      Other Visit Diagnoses     Herpes zoster without complication    -  Primary    Relevant Medications    valACYclovir (VALTREX) 1,000 mg tablet    predniSONE 20 mg tablet    Cerebrovascular accident (CVA), unspecified mechanism (HCC)        Relevant Medications    clopidogrel (PLAVIX) 75 mg tablet           Diagnoses and all orders for this visit:    Herpes zoster without complication  -     valACYclovir (VALTREX) 1,000 mg tablet; Take 1 tablet (1,000 mg total) by mouth 2 (two) times a day for 5 days  -     predniSONE 20 mg tablet; 3 Tabs PO QDay x 3 Days, Then 2 Tabs PO QDay x 3 Days, Then 1 Tab PO QDay x 3 Days, Then 1/2 Tab PO QDay x 4 Days, Then Stop    Cerebrovascular accident (CVA), unspecified mechanism ([facility] )  -     clopidogrel (PLAVIX) 75 mg tablet; One Tab PO QDay Brand Necessary    Stage 3a chronic kidney disease ([facility] )        No problem-specific Assessment & Plan notes found for this encounter  Subjective: The patient was seen and examined and noted to have issues with an elevated BP  Patient ID: John Paul Do is a 80 y o  female  The patient was seen and examined and noted to have issues with a rash in the L1 dermatome and noted to have issues with itching burning pain in the the area  The patient states that she has problems with her Plavix (generic)  She notes that it makes her dizzy  I discussed this with her noting the branded medication may not be available  She wants to be prescribed this  Script for branded Plavix sent to the pharmacy        The following portions of the patient's history were reviewed and updated as appropriate:   She has a past medical history of Acute CVA (cerebrovascular accident) ([address] ), Chronic kidney disease, Dementia ([address] ), Gout, Hypertension, and Osteoporosis  ,  does not have any pertinent problems on file  ,   has a past surgical history that includes Appendectomy; Hysterectomy; Tubal ligation; and Uterine fibroid surgery  ,  family history includes Hypertension in her father; Other in her father; Stroke in her mother  ,   reports that she has quit smoking  She has never used smokeless tobacco  She reports that she does not drink alcohol or use drugs  ,  is allergic to oxycodone-acetaminophen     Current Outpatient Medications   Medication Sig Dispense Refill    alendronate (FOSAMAX) 70 mg tablet Take 1 tablet (70 mg total) by mouth once a week 12 tablet 3    allopurinol (ZYLOPRIM) 100 mg tablet Take 1 tablet (100 mg total) by mouth daily 90 tablet 1    amLODIPine (NORVASC) 5 mg tablet Take 1 tablet (5 mg total) by mouth daily 90 tablet 3    atenolol (TENORMIN) 50 mg tablet Take 1 tablet (50 mg total) by mouth daily 30 tablet 5    atorvastatin (LIPITOR) 40 mg tablet Take 1 tablet (40 mg total) by mouth daily 90 tablet 1    clopidogrel (PLAVIX) 75 mg tablet One Tab PO QDay Brand Necessary 90 tablet 1    donepezil (ARICEPT) 5 mg tablet Take 1 tablet (5 mg total) by mouth daily at bedtime 90 tablet 3    ergocalciferol (VITAMIN D2) 50,000 units Take 1 capsule (50,000 Units total) by mouth once a week 12 capsule 3    fluticasone (FLONASE) 50 mcg/act nasal spray Use 1 spray(s) in each nostril once daily 16 g 5    hydrochlorothiazide (HYDRODIURIL) 50 mg tablet Take 1 tablet (50 mg total) by mouth daily 90 tablet 1    lisinopril (ZESTRIL) 40 mg tablet Take 1 tablet (40 mg total) by mouth daily 90 tablet 1    loratadine (CLARITIN) 10 mg tablet Take 1 tablet (10 mg total) by mouth daily 90 tablet 0    Multiple Vitamins-Minerals (MULTI ADULT GUMMIES) CHEW Chew 1 tablet daily      OMEGA-3 FATTY ACIDS-VITAMIN E PO Take 1,000 mg by mouth      pentoxifylline (TRENtal) 400 mg ER tablet Take 1 tablet (400 mg total) by mouth 3 (three) times a day with meals 90 tablet 1    predniSONE 20 mg tablet 3 Tabs PO QDay x 3 Days, Then 2 Tabs PO QDay x 3 Days, Then 1 Tab PO QDay x 3 Days, Then 1/2 Tab PO QDay x 4 Days, Then Stop 20 tablet 0    valACYclovir (VALTREX) 1,000 mg tablet Take 1 tablet (1,000 mg total) by mouth 2 (two) times a day for 5 days 10 tablet 0     No current facility-administered medications for this visit  Review of Systems   Constitutional: Negative for chills, fatigue and fever  HENT: Negative  Respiratory: Negative for cough, chest tightness and shortness of breath  Cardiovascular: Negative for chest pain, palpitations and leg swelling  Gastrointestinal: Negative for abdominal pain, constipation, diarrhea, nausea and vomiting  Genitourinary: Negative  Musculoskeletal: Negative for arthralgias, back pain and myalgias  Skin: Negative  Neurological: Negative  Psychiatric/Behavioral: Negative  Objective:  Vitals:    05/03/21 0822   BP: 154/66   BP Location: Left arm   Patient Position: Sitting   Cuff Size: Standard   Pulse: 82   Resp: 14   Temp: 99 8 °F (37 7 °C)   SpO2: 93%   Weight: 63 1 kg (139 lb 3 2 oz)   Height: 5' 4" (1 626 m)     Body mass index is 23 89 kg/m²  Physical Exam  Vitals signs and nursing note reviewed  Constitutional:       Appearance: She is well-developed  HENT:      Head: Normocephalic and atraumatic  Eyes:      Pupils: Pupils are equal, round, and reactive to light  Neck:      Musculoskeletal: Normal range of motion and neck supple  Cardiovascular:      Rate and Rhythm: Normal rate and regular rhythm  Heart sounds: Normal heart sounds  No murmur  Pulmonary:      Effort: Pulmonary effort is normal  No respiratory distress  Breath sounds: Normal breath sounds  No wheezing  Abdominal:      General: Bowel sounds are normal       Palpations: Abdomen is soft  Tenderness: There is no abdominal tenderness  Musculoskeletal: Normal range of motion     Skin:     General: Skin is warm and dry  Neurological:      Mental Status: She is alert and oriented to person, place, and time            PHQ-9 Depression Screening    PHQ-9:   Frequency of the following problems over the past two weeks:      Little interest or pleasure in doing things: 0 - not at all  Feeling down, depressed, or hopeless: 0 - not at all  PHQ-2 Score: 0

## 2021-05-03 NOTE — PROGRESS NOTES
Assessment and Plan:     Problem List Items Addressed This Visit     None           Preventive health issues were discussed with patient, and age appropriate screening tests were ordered as noted in patient's After Visit Summary  Personalized health advice and appropriate referrals for health education or preventive services given if needed, as noted in patient's After Visit Summary       History of Present Illness:     Patient presents for Medicare Annual Wellness visit    Patient Care Team:  Dylan Gutierrez DO as PCP - General (Internal Medicine)  Dylan Gutierrez DO as PCP - [address] [place], [place] [id number] [facility] ([facility])     Problem List:     Patient Active Problem List   Diagnosis    CKD (chronic kidney disease), stage III ([address] )    Gout    Osteoporosis    Hypertension    Mixed hyperlipidemia    Cerebral atherosclerosis    [facility]    Polymyalgia rheumatica ([facility] [facility]    Intermittent claudication ([facility] )    History of COVID-19      Past Medical and Surgical History:     Past Medical History:   Diagnosis Date    Acute CVA [facility]cerebrovascular accident) ([facility] [facility]     Chronic kidney disease     Dementia ([facility] [facility]     Gout     Hypertension     Osteoporosis      Past Surgical History:   Procedure Laterality Date    APPENDECTOMY      HYSTERECTOMY      TUBAL LIGATION      UTERINE FIBROID SURGERY        Family History:     Family History   Problem Relation Age of Onset    Stroke Mother     Other Father         Coal workers' pneumoconiosis    Hypertension Father       Social History:     E-Cigarette/Vaping    E-Cigarette Use Never User      E-Cigarette/Vaping Substances    Nicotine No     THC No     CBD No     Flavoring No     Other No     Unknown No      Social History     Socioeconomic History    Marital status:      Spouse name: None    Number of children: 4    Years of education: None    Highest education level: None   Occupational History    Occupation: RETIRED   Social Needs    Financial resource strain: None    Food insecurity     Worry: None     Inability: None    Transportation needs     Medical: None     Non-medical: None   Tobacco Use    Smoking status: Former Smoker    Smokeless tobacco: Never Used   Substance and Sexual Activity    Alcohol use: No     Frequency: Never     Drinks per session: Patient refused     Binge frequency: Never    Drug use: No    Sexual activity: Not Currently   Lifestyle    Physical activity     Days per week: None     Minutes per session: None    Stress: None   Relationships    Social connections     Talks on phone: None     Gets together: None     Attends [employer] service: None     Active member of club or organization: None     Attends meetings of clubs or organizations: None     Relationship status: None    Intimate partner violence     Fear of current or ex partner: None     Emotionally abused: None     Physically abused: None     Forced sexual activity: None   Other Topics Concern    None   Social History Narrative    Lives with adult children            Medications and Allergies:     Current Outpatient Medications   Medication Sig Dispense Refill    alendronate (FOSAMAX) 70 mg tablet Take 1 tablet (70 mg total) by mouth once a week 12 tablet 3    allopurinol (ZYLOPRIM) 100 mg tablet Take 1 tablet (100 mg total) by mouth daily 90 tablet 1    amLODIPine (NORVASC) 5 mg tablet Take 1 tablet (5 mg total) by mouth daily 90 tablet 3    atenolol (TENORMIN) 50 mg tablet Take 1 tablet (50 mg total) by mouth daily 30 tablet 5    atorvastatin (LIPITOR) 40 mg tablet Take 1 tablet (40 mg total) by mouth daily 90 tablet 1    clopidogrel (PLAVIX) 75 mg tablet Take 1 tablet (75 mg total) by mouth daily 90 tablet 1    donepezil (ARICEPT) 5 mg tablet Take 1 tablet (5 mg total) by mouth daily at bedtime 90 tablet 3    ergocalciferol (VITAMIN D2) 50,000 units Take 1 capsule (50,000 Units total) by mouth once a week 12 capsule 3    fluticasone (FLONASE) 50 mcg/act nasal spray Use 1 spray(s) in each nostril once daily 16 g 5    hydrochlorothiazide (HYDRODIURIL) 50 mg tablet Take 1 tablet (50 mg total) by mouth daily 90 tablet 1    lisinopril (ZESTRIL) 40 mg tablet Take 1 tablet (40 mg total) by mouth daily 90 tablet 1    loratadine (CLARITIN) 10 mg tablet Take 1 tablet (10 mg total) by mouth daily 90 tablet 0    Multiple Vitamins-Minerals (MULTI ADULT GUMMIES) CHEW Chew 1 tablet daily      OMEGA-3 FATTY ACIDS-VITAMIN E PO Take 1,000 mg by mouth      pentoxifylline (TRENtal) 400 mg ER tablet Take 1 tablet (400 mg total) by mouth 3 (three) times a day with meals 90 tablet 1     No current facility-administered medications for this visit  Allergies   Allergen Reactions    Oxycodone-Acetaminophen Rash      Immunizations:     Immunization History   Administered Date(s) Administered    INFLUENZA 11/05/1997, 10/20/2005, 11/02/2006, 10/19/2007, 10/25/2008, 10/10/2009, 10/23/2010, 09/10/2011, 03/27/2014, 09/24/2014, 09/23/2015, 09/28/2016, 10/02/2017    Influenza Split High Dose Preservative Free IM 09/23/2015, 09/28/2016, 10/02/2017    Influenza Whole 11/05/1997    Influenza, high dose seasonal 0 7 mL 09/18/2019, 09/08/2020    Influenza, seasonal, injectable 10/20/2005, 11/02/2006, 10/19/2007, 10/25/2008, 10/10/2009, 10/23/2010, 09/10/2011    Pneumococcal Conjugate 13-Valent 09/23/2015    Pneumococcal Polysaccharide PPV23 10/20/2005, 04/16/2012    Td (adult), adsorbed 11/05/1997, 10/20/2005, 02/05/2007    Tdap 07/01/2013      Health Maintenance: There are no preventive care reminders to display for this patient        Topic Date Due    COVID-19 Vaccine (1) Never done      Medicare Health Risk Assessment:     /66 (BP Location: Left arm, Patient Position: Sitting, Cuff Size: Standard)   Pulse 82   Temp 99 8 °F (37 7 °C)   Resp 14   Ht 5' 4" (1 626 m)   Wt 63 1 kg (139 lb 3 2 oz)   SpO2 93%   BMI 23 89 kg/m²      Carlene Coughlin is here for her Subsequent Wellness visit  Last Medicare Wellness visit information reviewed, patient interviewed and updates made to the record today  Health Risk Assessment:   Patient rates overall health as good  Patient feels that their physical health rating is same  Patient is dissatisfied with their life  Eyesight was rated as same  Hearing was rated as same  Patient feels that their emotional and mental health rating is same  Patients states they are never, rarely angry  Patient states they are sometimes unusually tired/fatigued  Pain experienced in the last 7 days has been none  Patient states that she has experienced no weight loss or gain in last 6 months  Depression Screening:   PHQ-2 Score: 0      Fall Risk Screening: In the past year, patient has experienced: no history of falling in past year      Urinary Incontinence Screening:   Patient has not leaked urine accidently in the last six months  Home Safety:  Patient does not have trouble with stairs inside or outside of their home  Patient has working smoke alarms and has working carbon monoxide detector  Home safety hazards include: none  Nutrition:   Current diet is Regular  Medications:   Patient is currently taking over-the-counter supplements  OTC medications include: see medication list  Patient is able to manage medications  Activities of Daily Living (ADLs)/Instrumental Activities of Daily Living (IADLs):   Walk and transfer into and out of bed and chair?: Yes  Dress and groom yourself?: Yes    Bathe or shower yourself?: Yes    Feed yourself? Yes  Do your laundry/housekeeping?: Yes  Manage your money, pay your bills and track your expenses?: Yes  Make your own meals?: Yes    Do your own shopping?: Yes    Previous Hospitalizations:   Any hospitalizations or ED visits within the last 12 months?: No      Advance Care Planning:   Living will: Yes    Durable POA for healthcare:  Yes    Advanced directive: Yes    Advanced directive counseling given: No    Five wishes given: No    Patient declined ACP directive: No    End of Life Decisions reviewed with patient: Yes    Provider agrees with end of life decisions: Yes      Cognitive Screening:   Provider or family/friend/caregiver concerned regarding cognition?: No    PREVENTIVE SCREENINGS      Cardiovascular Screening:    General: Screening Not Indicated and History Lipid Disorder      Diabetes Screening:     General: Screening Current      Colorectal Cancer Screening:     General: Screening Not Indicated      Breast Cancer Screening:     General: Screening Not Indicated      Cervical Cancer Screening:    General: Screening Not Indicated      Osteoporosis Screening:    General: Screening Not Indicated and History Osteoporosis      Abdominal Aortic Aneurysm (AAA) Screening:        General: Screening Not Indicated      Lung Cancer Screening:     General: Screening Not Indicated      Hepatitis C Screening:    General: Screening Not Indicated    Screening, Brief Intervention, and Referral to Treatment (SBIRT)    Screening  Typical number of drinks in a day: 0  Typical number of drinks in a week: 0  Interpretation: Low risk drinking behavior      Single Item Drug Screening:  How often have you used an illegal drug (including marijuana) or a prescription medication for non-medical reasons in the past year? never    Single Item Drug Screen Score: 0  Interpretation: Negative screen for possible drug use disorder      Dru Melendez DO

## 2021-05-03 NOTE — PATIENT INSTRUCTIONS
Medicare Preventive Visit Patient Instructions  Thank you for completing your Welcome to Medicare Visit or Medicare Annual Wellness Visit today  Your next wellness visit will be due in one year (5/4/2022)  The screening/preventive services that you may require over the next 5-10 years are detailed below  Some tests may not apply to you based off risk factors and/or age  Screening tests ordered at today's visit but not completed yet may show as past due  Also, please note that scanned in results may not display below  Preventive Screenings:  Service Recommendations Previous Testing/Comments   Colorectal Cancer Screening  * Colonoscopy    * Fecal Occult Blood Test (FOBT)/Fecal Immunochemical Test (FIT)  * Fecal DNA/Cologuard Test  * Flexible Sigmoidoscopy Age: 54-65 years old   Colonoscopy: every 10 years (may be performed more frequently if at higher risk)  OR  FOBT/FIT: every 1 year  OR  Cologuard: every 3 years  OR  Sigmoidoscopy: every 5 years  Screening may be recommended earlier than age 48 if at higher risk for colorectal cancer  Also, an individualized decision between you and your healthcare provider will decide whether screening between the ages of 74-80 would be appropriate  Colonoscopy: Not on file  FOBT/FIT: Not on file  Cologuard: Not on file  Sigmoidoscopy: Not on file          Breast Cancer Screening Age: 36 years old  Frequency: every 1-2 years  Not required if history of left and right mastectomy Mammogram: 09/25/2013        Cervical Cancer Screening Between the ages of 21-29, pap smear recommended once every 3 years  Between the ages of 33-67, can perform pap smear with HPV co-testing every 5 years     Recommendations may differ for women with a history of total hysterectomy, cervical cancer, or abnormal pap smears in past  Pap Smear: Not on file    Screening Not Indicated   Hepatitis C Screening Once for adults born between [facility]  More frequently in patients at high risk for Hepatitis C Hep C Antibody: Not on file        Diabetes Screening 1-2 times per year if you're at risk for diabetes or have pre-diabetes Fasting glucose: 88 mg/dL   A1C: 5 3 %    Screening Current   Cholesterol Screening Once every 5 years if you don't have a lipid disorder  May order more often based on risk factors  Lipid panel: 11/16/2020    Screening Not Indicated  History Lipid Disorder     Other Preventive Screenings Covered by Medicare:  1  Abdominal Aortic Aneurysm (AAA) Screening: covered once if your at risk  You're considered to be at risk if you have a family history of AAA  2  Lung Cancer Screening: covers low dose CT scan once per year if you meet all of the following conditions: (1) Age 50-69; (2) No signs or symptoms of lung cancer; (3) Current smoker or have quit smoking within the last 15 years; (4) You have a tobacco smoking history of at least 30 pack years (packs per day multiplied by number of years you smoked); (5) You get a written order from a healthcare provider  3  Glaucoma Screening: covered annually if you're considered high risk: (1) You have diabetes OR (2) Family history of glaucoma OR (3)  aged 48 and older OR (3)  American aged 72 and older  3  Osteoporosis Screening: covered every 2 years if you meet one of the following conditions: (1) You're estrogen deficient and at risk for osteoporosis based off medical history and other findings; (2) Have a vertebral abnormality; (3) On glucocorticoid therapy for more than 3 months; (4) Have primary hyperparathyroidism; (5) On osteoporosis medications and need to assess response to drug therapy  · Last bone density test (DXA Scan): Not on file  5  HIV Screening: covered annually if you're between the age of 12-76  Also covered annually if you are younger than 13 and older than 72 with risk factors for HIV infection  For pregnant patients, it is covered up to 3 times per pregnancy      Immunizations:  Immunization Recommendations   Influenza Vaccine Annual influenza vaccination during flu season is recommended for all persons aged >= 6 months who do not have contraindications   Pneumococcal Vaccine (Prevnar and Pneumovax)  * Prevnar = PCV13  * Pneumovax = PPSV23   Adults 25-60 years old: 1-3 doses may be recommended based on certain risk factors  Adults 72 years old: Prevnar (PCV13) vaccine recommended followed by Pneumovax (PPSV23) vaccine  If already received PPSV23 since turning 65, then PCV13 recommended at least one year after PPSV23 dose  Hepatitis B Vaccine 3 dose series if at intermediate or high risk (ex: diabetes, end stage renal disease, liver disease)   Tetanus (Td) Vaccine - COST NOT COVERED BY MEDICARE PART B Following completion of primary series, a booster dose should be given every 10 years to maintain immunity against tetanus  Td may also be given as tetanus wound prophylaxis  Tdap Vaccine - COST NOT COVERED BY MEDICARE PART B Recommended at least once for all adults  For pregnant patients, recommended with each pregnancy  Shingles Vaccine (Shingrix) - COST NOT COVERED BY MEDICARE PART B  2 shot series recommended in those aged 48 and above     Health Maintenance Due:  There are no preventive care reminders to display for this patient  Immunizations Due:      Topic Date Due    COVID-19 Vaccine (1) Never done     Advance Directives   What are advance directives? Advance directives are legal documents that state your wishes and plans for medical care  These plans are made ahead of time in case you lose your ability to make decisions for yourself  Advance directives can apply to any medical decision, such as the treatments you want, and if you want to donate organs  What are the types of advance directives? There are many types of advance directives, and each state has rules about how to use them  You may choose a combination of any of the following:  · Living will:   This is a written record of the treatment you want  You can also choose which treatments you do not want, which to limit, and which to stop at a certain time  This includes surgery, medicine, IV fluid, and tube feedings  · Durable power of  for healthcare [facility]): This is a written record that states who you want to make healthcare choices for you when you are unable to make them for yourself  This person, called a proxy, is usually a family member or a friend  You may choose more than 1 proxy  · Do not resuscitate (DNR) order:  A DNR order is used in case your heart stops beating or you stop breathing  It is a request not to have certain forms of treatment, such as CPR  A DNR order may be included in other types of advance directives  · Medical directive: This covers the care that you want if you are in a coma, near death, or unable to make decisions for yourself  You can list the treatments you want for each condition  Treatment may include pain medicine, surgery, blood transfusions, dialysis, IV or tube feedings, and a ventilator (breathing machine)  · Values history: This document has questions about your views, beliefs, and how you feel and think about life  This information can help others choose the care that you would choose  Why are advance directives important? An advance directive helps you control your care  Although spoken wishes may be used, it is better to have your wishes written down  Spoken wishes can be misunderstood, or not followed  Treatments may be given even if you do not want them  An advance directive may make it easier for your family to make difficult choices about your care  © Copyright [employer] 2018 Information is for End User's use only and may not be sold, redistributed or otherwise used for commercial purposes   All illustrations and images included in CareNotes® are the copyrighted property of [employer]  or [employer]

## 2021-05-07 ENCOUNTER — OFFICE VISIT (OUTPATIENT)
Dept: INTERNAL MEDICINE CLINIC | Facility: CLINIC | Age: 81
End: 2021-05-07
Payer: COMMERCIAL

## 2021-05-07 VITALS
SYSTOLIC BLOOD PRESSURE: 134 MMHG | HEIGHT: 64 IN | HEART RATE: 51 BPM | DIASTOLIC BLOOD PRESSURE: 78 MMHG | BODY MASS INDEX: 23.25 KG/M2 | TEMPERATURE: 97.7 F | OXYGEN SATURATION: 93 % | RESPIRATION RATE: 14 BRPM | WEIGHT: 136.2 LBS

## 2021-05-07 DIAGNOSIS — M1A.9XX0 CHRONIC GOUT WITHOUT TOPHUS, UNSPECIFIED CAUSE, UNSPECIFIED SITE: ICD-10-CM

## 2021-05-07 DIAGNOSIS — I73.9 INTERMITTENT CLAUDICATION (HCC): ICD-10-CM

## 2021-05-07 DIAGNOSIS — M81.0 AGE-RELATED OSTEOPOROSIS WITHOUT CURRENT PATHOLOGICAL FRACTURE: ICD-10-CM

## 2021-05-07 DIAGNOSIS — I73.9 PERIPHERAL VASCULAR DISEASE (HCC): ICD-10-CM

## 2021-05-07 DIAGNOSIS — B02.9 HERPES ZOSTER WITHOUT COMPLICATION: Primary | ICD-10-CM

## 2021-05-07 DIAGNOSIS — I10 ESSENTIAL HYPERTENSION: ICD-10-CM

## 2021-05-07 PROCEDURE — 99214 OFFICE O/P EST MOD 30 MIN: CPT | Performed by: INTERNAL MEDICINE

## 2021-05-07 NOTE — PROGRESS NOTES
Assessment/Plan:  Follow up on regular medical issues  The patient desires follow on PVD in the fall  Problem List Items Addressed This Visit        Cardiovascular and Mediastinum    Hypertension    Peripheral vascular disease ([facility] )       Musculoskeletal and Integument    Osteoporosis       Other    Gout    Intermittent claudication ([facility] [address] )      Other Visit Diagnoses     Herpes zoster without complication    -  Primary           Diagnoses and all orders for this visit:    Herpes zoster without complication    Peripheral vascular disease ([facility] [address] )    Essential hypertension    Age-related osteoporosis without current pathological fracture    Intermittent claudication (HCC)    Chronic gout without tophus, unspecified cause, unspecified site        No problem-specific Assessment & Plan notes found for this encounter  Subjective: The patient noted marked relief with the current medications  Patient ID: Adin Tatum is a 80 y o  female  The patient was seen and examined and noted to have issues with herpes zoster  The patient states that there are no other issues at this time  The patient states that he has no other issues at this time  She has no issues with the allopurinol and notes no gouty attacks  The patient BP is good on recheck and she is doing well with the current regimen  She is on both Plavix (TIA/CVA)  In addition, she is on Trent[place] for PVD  We discussed further imaging of her legs  She is doing well with the Fosamax  The following portions of the patient's history were reviewed and updated as appropriate:   She has a past medical history of Acute CVA (cerebrovascular accident) ([facility] ), Chronic kidney disease, Dementia ([address] ), Gout, Hypertension, and Osteoporosis  ,  does not have any pertinent problems on file  ,   has a past surgical history that includes Appendectomy; Hysterectomy; Tubal ligation; and Uterine fibroid surgery  ,  family history includes Hypertension in her father;  Other in her father; Stroke in her mother  ,   reports that she has quit smoking  She has never used smokeless tobacco  She reports that she does not drink alcohol or use drugs  ,  is allergic to oxycodone-acetaminophen     Current Outpatient Medications   Medication Sig Dispense Refill    alendronate (FOSAMAX) 70 mg tablet Take 1 tablet (70 mg total) by mouth once a week 12 tablet 3    allopurinol (ZYLOPRIM) 100 mg tablet Take 1 tablet (100 mg total) by mouth daily 90 tablet 1    amLODIPine (NORVASC) 5 mg tablet Take 1 tablet (5 mg total) by mouth daily 90 tablet 3    atenolol (TENORMIN) 50 mg tablet Take 1 tablet (50 mg total) by mouth daily 30 tablet 5    atorvastatin (LIPITOR) 40 mg tablet Take 1 tablet (40 mg total) by mouth daily 90 tablet 1    clopidogrel (PLAVIX) 75 mg tablet One Tab PO QDay Brand Necessary 90 tablet 1    donepezil (ARICEPT) 5 mg tablet Take 1 tablet (5 mg total) by mouth daily at bedtime 90 tablet 3    ergocalciferol (VITAMIN D2) 50,000 units Take 1 capsule (50,000 Units total) by mouth once a week 12 capsule 3    fluticasone (FLONASE) 50 mcg/act nasal spray Use 1 spray(s) in each nostril once daily 16 g 5    hydrochlorothiazide (HYDRODIURIL) 50 mg tablet Take 1 tablet (50 mg total) by mouth daily 90 tablet 1    lisinopril (ZESTRIL) 40 mg tablet Take 1 tablet (40 mg total) by mouth daily 90 tablet 1    loratadine (CLARITIN) 10 mg tablet Take 1 tablet (10 mg total) by mouth daily 90 tablet 0    Multiple Vitamins-Minerals (MULTI ADULT GUMMIES) CHEW Chew 1 tablet daily      OMEGA-3 FATTY ACIDS-VITAMIN E PO Take 1,000 mg by mouth      pentoxifylline (TRENtal) 400 mg ER tablet Take 1 tablet (400 mg total) by mouth 3 (three) times a day with meals 90 tablet 1    predniSONE 20 mg tablet 3 Tabs PO QDay x 3 Days, Then 2 Tabs PO QDay x 3 Days, Then 1 Tab PO QDay x 3 Days, Then 1/2 Tab PO QDay x 4 Days, Then Stop 20 tablet 0    valACYclovir (VALTREX) 1,000 mg tablet Take 1 tablet (1,000 mg total) by mouth 2 (two) times a day for 5 days 10 tablet 0     No current facility-administered medications for this visit  Review of Systems   Constitutional: Negative for chills, fatigue and fever  HENT: Negative  Respiratory: Negative for cough, chest tightness and shortness of breath  Cardiovascular: Negative for chest pain, palpitations and leg swelling  Gastrointestinal: Negative for abdominal pain, constipation, diarrhea, nausea and vomiting  Genitourinary: Negative  Musculoskeletal: Negative for arthralgias, back pain and myalgias  Skin: Negative  Neurological: Negative  Psychiatric/Behavioral: Negative  Objective:  Vitals:    05/07/21 0745 05/07/21 0854   BP: 164/72 134/78   BP Location: Left arm    Patient Position: Sitting    Cuff Size: Large    Pulse: (!) 51    Resp: 14    Temp: 97 7 °F (36 5 °C)    SpO2: 93%    Weight: 61 8 kg (136 lb 3 2 oz)    Height: 5' 4" (1 626 m)      Body mass index is 23 38 kg/m²  Physical Exam  Vitals signs and nursing note reviewed  Constitutional:       Appearance: She is well-developed  HENT:      Head: Normocephalic and atraumatic  Eyes:      Pupils: Pupils are equal, round, and reactive to light  Neck:      Musculoskeletal: Normal range of motion and neck supple  Cardiovascular:      Rate and Rhythm: Normal rate and regular rhythm  Heart sounds: Normal heart sounds  No murmur  Pulmonary:      Effort: Pulmonary effort is normal  No respiratory distress  Breath sounds: Normal breath sounds  No wheezing  Abdominal:      General: Bowel sounds are normal       Palpations: Abdomen is soft  Tenderness: There is no abdominal tenderness  Musculoskeletal: Normal range of motion  Skin:     General: Skin is warm and dry  Neurological:      Mental Status: She is alert and oriented to person, place, and time            PHQ-9 Depression Screening    PHQ-9:   Frequency of the following problems over the past two weeks:

## 2021-05-13 ENCOUNTER — TELEPHONE (OUTPATIENT)
Dept: INTERNAL MEDICINE CLINIC | Facility: CLINIC | Age: 81
End: 2021-05-13

## 2021-05-13 NOTE — TELEPHONE ENCOUNTER
Pt needs atenolol (TENORMIN) 50 mg tablet, alendronate (FOSAMAX) 70 mg tablet, allopurinol (ZYLOPRIM) 100 mg tablet, amLODIPine (NORVASC) 5 mg tablet, hydrochlorothiazide (HYDRODIURIL) 50 mg tablet, lisinopril (ZESTRIL) 40 mg tablet, ergocalciferol (VITAMIN D2) 50,000 units, pentoxifylline (TRENtal) 400 mg ER tablet refilled, 90 day supply, send to [address] [facility] in [place]

## 2021-05-14 DIAGNOSIS — I10 ESSENTIAL HYPERTENSION: ICD-10-CM

## 2021-05-14 DIAGNOSIS — M81.0 AGE-RELATED OSTEOPOROSIS WITHOUT CURRENT PATHOLOGICAL FRACTURE: ICD-10-CM

## 2021-05-14 DIAGNOSIS — M1A.9XX0 CHRONIC GOUT WITHOUT TOPHUS, UNSPECIFIED CAUSE, UNSPECIFIED SITE: ICD-10-CM

## 2021-05-14 DIAGNOSIS — I63.9 CEREBROVASCULAR ACCIDENT (CVA), UNSPECIFIED MECHANISM (HCC): ICD-10-CM

## 2021-05-14 DIAGNOSIS — E55.9 VITAMIN D DEFICIENCY: ICD-10-CM

## 2021-05-14 RX ORDER — ALENDRONATE SODIUM 70 MG/1
70 TABLET ORAL WEEKLY
Qty: 12 TABLET | Refills: 3 | Status: SHIPPED | OUTPATIENT
Start: 2021-05-14 | End: 2021-06-07

## 2021-05-14 RX ORDER — PENTOXIFYLLINE 400 MG/1
400 TABLET, EXTENDED RELEASE ORAL
Qty: 90 TABLET | Refills: 1 | Status: SHIPPED | OUTPATIENT
Start: 2021-05-14 | End: 2021-09-07 | Stop reason: SDUPTHER

## 2021-05-14 RX ORDER — AMLODIPINE BESYLATE 5 MG/1
5 TABLET ORAL DAILY
Qty: 90 TABLET | Refills: 1 | Status: SHIPPED | OUTPATIENT
Start: 2021-05-14 | End: 2021-05-19

## 2021-05-14 RX ORDER — ATENOLOL 50 MG/1
50 TABLET ORAL DAILY
Qty: 90 TABLET | Refills: 1 | Status: SHIPPED | OUTPATIENT
Start: 2021-05-14

## 2021-05-14 RX ORDER — ALLOPURINOL 100 MG/1
100 TABLET ORAL DAILY
Qty: 90 TABLET | Refills: 1 | Status: SHIPPED | OUTPATIENT
Start: 2021-05-14 | End: 2021-09-07 | Stop reason: SDUPTHER

## 2021-05-14 RX ORDER — HYDROCHLOROTHIAZIDE 50 MG/1
50 TABLET ORAL DAILY
Qty: 90 TABLET | Refills: 1 | Status: SHIPPED | OUTPATIENT
Start: 2021-05-14 | End: 2021-06-07 | Stop reason: SDUPTHER

## 2021-05-14 RX ORDER — ERGOCALCIFEROL 1.25 MG/1
50000 CAPSULE ORAL WEEKLY
Qty: 12 CAPSULE | Refills: 3 | Status: SHIPPED | OUTPATIENT
Start: 2021-05-14 | End: 2021-06-07

## 2021-05-14 RX ORDER — LISINOPRIL 40 MG/1
40 TABLET ORAL DAILY
Qty: 90 TABLET | Refills: 1 | Status: SHIPPED | OUTPATIENT
Start: 2021-05-14 | End: 2021-09-07 | Stop reason: SDUPTHER

## 2021-05-19 ENCOUNTER — OFFICE VISIT (OUTPATIENT)
Dept: INTERNAL MEDICINE CLINIC | Facility: CLINIC | Age: 81
End: 2021-05-19
Payer: COMMERCIAL

## 2021-05-19 VITALS
HEIGHT: 64 IN | RESPIRATION RATE: 16 BRPM | HEART RATE: 71 BPM | WEIGHT: 137.4 LBS | OXYGEN SATURATION: 89 % | DIASTOLIC BLOOD PRESSURE: 50 MMHG | BODY MASS INDEX: 23.46 KG/M2 | SYSTOLIC BLOOD PRESSURE: 120 MMHG | TEMPERATURE: 99.1 F

## 2021-05-19 DIAGNOSIS — I10 ESSENTIAL HYPERTENSION: ICD-10-CM

## 2021-05-19 DIAGNOSIS — B02.9 HERPES ZOSTER WITHOUT COMPLICATION: ICD-10-CM

## 2021-05-19 DIAGNOSIS — N18.32 STAGE 3B CHRONIC KIDNEY DISEASE (HCC): ICD-10-CM

## 2021-05-19 DIAGNOSIS — B02.8 HERPES ZOSTER WITH OTHER COMPLICATION: Primary | ICD-10-CM

## 2021-05-19 PROCEDURE — 99214 OFFICE O/P EST MOD 30 MIN: CPT | Performed by: NURSE PRACTITIONER

## 2021-05-19 RX ORDER — METHOCARBAMOL 500 MG/1
500 TABLET, FILM COATED ORAL 4 TIMES DAILY PRN
Qty: 90 TABLET | Refills: 0 | Status: SHIPPED | OUTPATIENT
Start: 2021-05-19 | End: 2021-06-07

## 2021-05-19 RX ORDER — VALACYCLOVIR HYDROCHLORIDE 1 G/1
1000 TABLET, FILM COATED ORAL 2 TIMES DAILY
Qty: 20 TABLET | Refills: 0 | Status: SHIPPED | OUTPATIENT
Start: 2021-05-19 | End: 2021-11-04 | Stop reason: HOSPADM

## 2021-05-19 NOTE — ASSESSMENT & PLAN NOTE
Lab Results   Component Value Date    EGFR 42 11/16/2020    EGFR 43 05/07/2020    EGFR 51 09/05/2019    CREATININE 1 21 11/16/2020    CREATININE 1 20 05/07/2020    CREATININE 1 05 09/05/2019   · avoid nephrotoxic medications

## 2021-05-19 NOTE — ASSESSMENT & PLAN NOTE
· Patient continues with scabs  · Right side back/flank pain  · Will restart valtrex for another 10 days  · Will give her robaxin for her pains

## 2021-05-19 NOTE — PROGRESS NOTES
Assessment/Plan:    Problem List Items Addressed This Visit        Cardiovascular and Mediastinum    Essential hypertension     · Patient complains of some lightheaded ness  · Continue atenolol, HCTZ, Lisinopril            Genitourinary    Stage 3b chronic kidney disease ([facility] )     Lab Results   Component Value Date    EGFR 42 11/16/2020    EGFR 43 05/07/2020    EGFR 51 09/05/2019    CREATININE 1 21 11/16/2020    CREATININE 1 20 05/07/2020    CREATININE 1 05 09/05/2019 ·   avoid nephrotoxic medications             Other    Shingles - Primary     · Patient continues with scabs  · Right side back/flank pain  · Will restart valtrex for another 10 days  · Will give her robaxin for her pains  Relevant Medications    valACYclovir (VALTREX) 1,000 mg tablet    methocarbamol (ROBAXIN) 500 mg tablet         Diagnoses and all orders for this visit:    Herpes zoster with other complication    Herpes zoster without complication  -     valACYclovir (VALTREX) 1,000 mg tablet; Take 1 tablet (1,000 mg total) by mouth 2 (two) times a day for 10 days  -     methocarbamol (ROBAXIN) 500 mg tablet; Take 1 tablet (500 mg total) by mouth 4 (four) times a day as needed for muscle spasms    Stage 3b chronic kidney disease ([facility] )    Essential hypertension     Shingles  · Patient continues with scabs  · Right side back/flank pain  · Will restart valtrex for another 10 days  · Will give her robaxin for her pains  Essential hypertension  · Patient complains of some lightheaded ness  · Continue atenolol, HCTZ, Lisinopril    Stage 3[facility] ([facility] )  Lab Results   Component Value Date    EGFR 42 11/16/2020    EGFR 43 05/07/2020    EGFR 51 09/05/2019    CREATININE 1 21 11/16/2020    CREATININE 1 20 05/07/2020    CREATININE 1 05 09/05/2019   · avoid nephrotoxic medications       Subjective:      Patient ID: Glynn Burton is a 80 y o  female      Glynn Burton is a 80 y o  female who presents today for evaluation of a crusted, painful, pruritic and raised rash for the past 3 weeks  The rash was first noted on right side of her back/flank area back  The rash has not spread  The rash has been accompanied by pain and stabbing  Prior to the appearance of the rash, the patient experienced no prodromal symptoms  She has tried valtrex and prednisone with improvement  Will continue another a 10 days of valtrex, will also give robaxin for pain  The following portions of the patient's history were reviewed and updated as appropriate:   Past Medical History:  She has a past medical history of Acute CVA (cerebrovascular accident) ([facility] ), [facility] ), Gout, Hypertension, Osteoporosis, and Shingles  ,  _______________________________________________________________________  Medical Problems:  does not have any pertinent problems on file ,  _______________________________________________________________________  Past Surgical History:   has a past surgical history that includes Appendectomy; Hysterectomy; Tubal ligation; and Uterine fibroid surgery  ,  _______________________________________________________________________  Family History:  family history includes Hypertension in her father; Other in her father; Stroke in her mother ,  _______________________________________________________________________  Social History:   reports that she has quit smoking  She has never used smokeless tobacco  She reports that she does not drink alcohol or use drugs  ,  _______________________________________________________________________  Allergies:  is allergic to oxycodone-acetaminophen     _______________________________________________________________________  Current Outpatient Medications   Medication Sig Dispense Refill    alendronate (FOSAMAX) 70 mg tablet Take 1 tablet (70 mg total) by mouth once a week 12 tablet 3    allopurinol (ZYLOPRIM) 100 mg tablet Take 1 tablet (100 mg total) by mouth daily 90 tablet 1    atenolol (TENORMIN) 50 mg tablet Take 1 tablet (50 mg total) by mouth daily 90 tablet 1    atorvastatin (LIPITOR) 40 mg tablet Take 1 tablet (40 mg total) by mouth daily 90 tablet 1    clopidogrel (PLAVIX) 75 mg tablet One Tab PO QDay Brand Necessary 90 tablet 1    donepezil (ARICEPT) 5 mg tablet Take 1 tablet (5 mg total) by mouth daily at bedtime 90 tablet 3    ergocalciferol (VITAMIN D2) 50,000 units Take 1 capsule (50,000 Units total) by mouth once a week 12 capsule 3    fluticasone (FLONASE) 50 mcg/act nasal spray Use 1 spray(s) in each nostril once daily 16 g 5    hydrochlorothiazide (HYDRODIURIL) 50 mg tablet Take 1 tablet (50 mg total) by mouth daily 90 tablet 1    lisinopril (ZESTRIL) 40 mg tablet Take 1 tablet (40 mg total) by mouth daily 90 tablet 1    loratadine (CLARITIN) 10 mg tablet Take 1 tablet (10 mg total) by mouth daily 90 tablet 0    Multiple Vitamins-Minerals (MULTI ADULT GUMMIES) CHEW Chew 1 tablet daily      OMEGA-3 FATTY ACIDS-VITAMIN E PO Take 1,000 mg by mouth      pentoxifylline (TRENtal) 400 mg ER tablet Take 1 tablet (400 mg total) by mouth 3 (three) times a day with meals 90 tablet 1    methocarbamol (ROBAXIN) 500 mg tablet Take 1 tablet (500 mg total) by mouth 4 (four) times a day as needed for muscle spasms 90 tablet 0    predniSONE 20 mg tablet 3 Tabs PO QDay x 3 Days, Then 2 Tabs PO QDay x 3 Days, Then 1 Tab PO QDay x 3 Days, Then 1/2 Tab PO QDay x 4 Days, Then Stop (Patient not taking: Reported on 5/19/2021) 20 tablet 0    valACYclovir (VALTREX) 1,000 mg tablet Take 1 tablet (1,000 mg total) by mouth 2 (two) times a day for 10 days 20 tablet 0     No current facility-administered medications for this visit       _______________________________________________________________________  Review of Systems   Constitutional: Negative for activity change, chills, fatigue and fever  HENT: Negative for rhinorrhea and sore throat  Eyes: Negative for pain     Respiratory: Negative for cough and shortness of breath  Cardiovascular: Negative for chest pain, palpitations and leg swelling  Gastrointestinal: Negative for abdominal pain, constipation, diarrhea, nausea and vomiting  Genitourinary: Negative for difficulty urinating, flank pain, frequency and urgency  Musculoskeletal: Negative for gait problem, joint swelling and myalgias  Skin: Positive for rash  Negative for color change  Neurological: Negative for dizziness, weakness, light-headedness and headaches  Psychiatric/Behavioral: Negative for sleep disturbance  The patient is not nervous/anxious  All other systems reviewed and are negative  Objective:  Vitals:    05/19/21 1500   BP: 120/50   BP Location: Left arm   Patient Position: Sitting   Cuff Size: Standard   Pulse: 71   Resp: 16   Temp: 99 1 °F (37 3 °C)   SpO2: (!) 89%   Weight: 62 3 kg (137 lb 6 4 oz)   Height: 5' 4" (1 626 m)     Body mass index is 23 58 kg/m²  Physical Exam  Vitals signs reviewed  Constitutional:       General: She is awake  Appearance: Normal appearance  She is well-developed and normal weight  HENT:      Head: Normocephalic and atraumatic  Nose: Nose normal       Mouth/Throat:      Mouth: Mucous membranes are moist    Eyes:      Extraocular Movements: Extraocular movements intact  Neck:      Musculoskeletal: Normal range of motion  Cardiovascular:      Rate and Rhythm: Normal rate and regular rhythm  Pulses: Normal pulses  Heart sounds: Normal heart sounds  Pulmonary:      Effort: Pulmonary effort is normal       Breath sounds: Normal breath sounds  Abdominal:      General: Bowel sounds are normal       Palpations: Abdomen is soft  Musculoskeletal: Normal range of motion  Right lower leg: No edema  Left lower leg: No edema  Comments: Generalized body aches with increased pain in the area of scabs from shingles  Skin:     General: Skin is warm and dry     Neurological: Mental Status: She is alert and oriented to person, place, and time  Psychiatric:         Attention and Perception: Attention normal          Mood and Affect: Mood normal          Speech: Speech normal          Behavior: Behavior normal  Behavior is cooperative             PHQ-9 Depression Screening    PHQ-9:   Frequency of the following problems over the past two weeks:

## 2021-06-07 ENCOUNTER — OFFICE VISIT (OUTPATIENT)
Dept: INTERNAL MEDICINE CLINIC | Facility: CLINIC | Age: 81
End: 2021-06-07
Payer: COMMERCIAL

## 2021-06-07 VITALS
RESPIRATION RATE: 14 BRPM | WEIGHT: 137.6 LBS | DIASTOLIC BLOOD PRESSURE: 54 MMHG | BODY MASS INDEX: 23.49 KG/M2 | SYSTOLIC BLOOD PRESSURE: 140 MMHG | HEART RATE: 71 BPM | TEMPERATURE: 99.7 F | HEIGHT: 64 IN | OXYGEN SATURATION: 95 %

## 2021-06-07 DIAGNOSIS — S09.90XA INJURY OF HEAD, INITIAL ENCOUNTER: ICD-10-CM

## 2021-06-07 DIAGNOSIS — I63.9 CEREBROVASCULAR ACCIDENT (CVA), UNSPECIFIED MECHANISM (HCC): Primary | ICD-10-CM

## 2021-06-07 DIAGNOSIS — N18.32 STAGE 3B CHRONIC KIDNEY DISEASE (HCC): ICD-10-CM

## 2021-06-07 DIAGNOSIS — M81.0 AGE-RELATED OSTEOPOROSIS WITHOUT CURRENT PATHOLOGICAL FRACTURE: ICD-10-CM

## 2021-06-07 DIAGNOSIS — I10 ESSENTIAL HYPERTENSION: ICD-10-CM

## 2021-06-07 PROCEDURE — 3078F DIAST BP <80 MM HG: CPT | Performed by: INTERNAL MEDICINE

## 2021-06-07 PROCEDURE — 1036F TOBACCO NON-USER: CPT | Performed by: INTERNAL MEDICINE

## 2021-06-07 PROCEDURE — 99214 OFFICE O/P EST MOD 30 MIN: CPT | Performed by: INTERNAL MEDICINE

## 2021-06-07 PROCEDURE — 1160F RVW MEDS BY RX/DR IN RCRD: CPT | Performed by: INTERNAL MEDICINE

## 2021-06-07 PROCEDURE — 3077F SYST BP >= 140 MM HG: CPT | Performed by: INTERNAL MEDICINE

## 2021-06-07 RX ORDER — HYDROCHLOROTHIAZIDE 25 MG/1
25 TABLET ORAL DAILY
Qty: 90 TABLET | Refills: 1 | Status: SHIPPED | OUTPATIENT
Start: 2021-06-07 | End: 2021-10-04 | Stop reason: SDUPTHER

## 2021-06-07 NOTE — PROGRESS NOTES
Assessment/Plan:    Problem List Items Addressed This Visit        Cardiovascular and Mediastinum    Essential hypertension    Relevant Medications    hydrochlorothiazide (HYDRODIURIL) 25 mg tablet    Other Relevant Orders    Lipid Panel with Direct LDL reflex    Comprehensive metabolic panel    CBC and differential       Musculoskeletal and Integument    Osteoporosis    Relevant Medications    denosumab (PROLIA) subcutaneous injection 60 mg (Start on 6/7/2021 10:15 AM)       Genitourinary    Stage 3b chronic kidney disease (HCC)    Relevant Medications    hydrochlorothiazide (HYDRODIURIL) 25 mg tablet      Other Visit Diagnoses     Cerebrovascular accident (CVA), unspecified mechanism ([facility] )    -  Primary    Injury of head, initial encounter        Relevant Orders    CT head wo contrast           Diagnoses and all orders for this visit:    Cerebrovascular accident (CVA), unspecified mechanism ([facility] )    Essential hypertension  -     Lipid Panel with Direct LDL reflex; Future  -     Comprehensive metabolic panel; Future  -     CBC and differential; Future  -     hydrochlorothiazide (HYDRODIURIL) 25 mg tablet; Take 1 tablet (25 mg total) by mouth daily    Age-related osteoporosis without current pathological fracture  -     denosumab (PROLIA) subcutaneous injection 60 mg    Injury of head, initial encounter  -     CT head wo contrast; Future    Stage [facility]        No problem-specific Assessment & Plan notes found for this encounter  Subjective: Head injury a week ago     Patient ID: John Paul Do is a 80 y o  female  The patient fell backward and struck the area of the occiput  This occurred one week ago  She notes dizziness since that time  The patient notes mild HA and states that she has other post concussive symptoms  She is on Plavix and Pletal and we will check a CT of the head  The patient states that she has CKD    She is noted to be on HCTZ 50 mg and we will try to lower that   It has been hot over the past weekend  The patient notes that she can not tolerate the Alendronate and we discussed and she is amendable to Prolia  The patient states that that the Atorvastatin is expensive  The following portions of the patient's history were reviewed and updated as appropriate:   She has a past medical history of Acute CVA (cerebrovascular accident) ([facility] ), Dementia ([facility] ), Gout, Hypertension, Osteoporosis, and Shingles  ,  does not have any pertinent problems on file  ,   has a past surgical history that includes Appendectomy; Hysterectomy; Tubal ligation; and Uterine fibroid surgery  ,  family history includes Hypertension in her father; Other in her father; Stroke in her mother  ,   reports that she has quit smoking  She has never used smokeless tobacco  She reports that she does not drink alcohol or use drugs  ,  is allergic to oxycodone-acetaminophen     Current Outpatient Medications   Medication Sig Dispense Refill    allopurinol (ZYLOPRIM) 100 mg tablet Take 1 tablet (100 mg total) by mouth daily 90 tablet 1    atenolol (TENORMIN) 50 mg tablet Take 1 tablet (50 mg total) by mouth daily 90 tablet 1    clopidogrel (PLAVIX) 75 mg tablet One Tab PO QDay Brand Necessary 90 tablet 1    donepezil (ARICEPT) 5 mg tablet Take 1 tablet (5 mg total) by mouth daily at bedtime 90 tablet 3    hydrochlorothiazide (HYDRODIURIL) 25 mg tablet Take 1 tablet (25 mg total) by mouth daily 90 tablet 1    lisinopril (ZESTRIL) 40 mg tablet Take 1 tablet (40 mg total) by mouth daily 90 tablet 1    Multiple Vitamins-Minerals (MULTI ADULT GUMMIES) CHEW Chew 1 tablet daily      OMEGA-3 FATTY ACIDS-VITAMIN E PO Take 1,000 mg by mouth      pentoxifylline (TRENtal) 400 mg ER tablet Take 1 tablet (400 mg total) by mouth 3 (three) times a day with meals 90 tablet 1    valACYclovir (VALTREX) 1,000 mg tablet Take 1 tablet (1,000 mg total) by mouth 2 (two) times a day for 10 days 20 tablet 0     Current Facility-Administered Medications   Medication Dose Route Frequency Provider Last Rate Last Admin    denosumab (PROLIA) subcutaneous injection 60 mg  60 mg Subcutaneous Once Verita Favorite, DO           Review of Systems   Constitutional: Negative for chills, fatigue and fever  HENT: Negative  Respiratory: Negative for cough, chest tightness and shortness of breath  Cardiovascular: Negative for chest pain, palpitations and leg swelling  Gastrointestinal: Negative for abdominal pain, constipation, diarrhea, nausea and vomiting  Genitourinary: Negative  Musculoskeletal: Negative for arthralgias, back pain and myalgias  Skin: Negative  Neurological: Positive for dizziness and headaches  Psychiatric/Behavioral: Negative  Objective:  Vitals:    06/07/21 0930   BP: 140/54   BP Location: Left arm   Patient Position: Sitting   Cuff Size: Large   Pulse: 71   Resp: 14   Temp: 99 7 °F (37 6 °C)   SpO2: 95%   Weight: 62 4 kg (137 lb 9 6 oz)   Height: 5' 4" (1 626 m)     Body mass index is 23 62 kg/m²  Physical Exam  Vitals signs and nursing note reviewed  Constitutional:       Appearance: She is well-developed  HENT:      Head: Normocephalic and atraumatic  Eyes:      Pupils: Pupils are equal, round, and reactive to light  Neck:      Musculoskeletal: Normal range of motion and neck supple  Cardiovascular:      Rate and Rhythm: Normal rate and regular rhythm  Heart sounds: Normal heart sounds  No murmur  Pulmonary:      Effort: Pulmonary effort is normal  No respiratory distress  Breath sounds: Normal breath sounds  No wheezing  Abdominal:      General: Bowel sounds are normal       Palpations: Abdomen is soft  Tenderness: There is no abdominal tenderness  Musculoskeletal: Normal range of motion  Skin:     General: Skin is warm and dry  Neurological:      Mental Status: She is alert  Mental status is at baseline  Cranial Nerves: No cranial nerve deficit  Sensory: No sensory deficit  Motor: No weakness  Coordination: Romberg sign negative   Coordination normal       Comments: (-) nystagmus           PHQ-9 Depression Screening    PHQ-9:   Frequency of the following problems over the past two weeks:

## 2021-06-08 ENCOUNTER — APPOINTMENT (OUTPATIENT)
Dept: LAB | Facility: CLINIC | Age: 81
End: 2021-06-08
Payer: COMMERCIAL

## 2021-06-08 DIAGNOSIS — I10 ESSENTIAL HYPERTENSION: ICD-10-CM

## 2021-06-08 LAB
ALBUMIN SERPL BCP-MCNC: 3.2 G/DL (ref 3.5–5)
ALP SERPL-CCNC: 71 U/L (ref 46–116)
ALT SERPL W P-5'-P-CCNC: 16 U/L (ref 12–78)
ANION GAP SERPL CALCULATED.3IONS-SCNC: 5 MMOL/L (ref 4–13)
AST SERPL W P-5'-P-CCNC: 16 U/L (ref 5–45)
BASOPHILS # BLD AUTO: 0.09 THOUSANDS/ΜL (ref 0–0.1)
BASOPHILS NFR BLD AUTO: 1 % (ref 0–1)
BILIRUB SERPL-MCNC: 0.71 MG/DL (ref 0.2–1)
BUN SERPL-MCNC: 25 MG/DL (ref 5–25)
CALCIUM ALBUM COR SERPL-MCNC: 10 MG/DL (ref 8.3–10.1)
CALCIUM SERPL-MCNC: 9.4 MG/DL (ref 8.3–10.1)
CHLORIDE SERPL-SCNC: 107 MMOL/L (ref 100–108)
CHOLEST SERPL-MCNC: 211 MG/DL (ref 50–200)
CO2 SERPL-SCNC: 27 MMOL/L (ref 21–32)
CREAT SERPL-MCNC: 1.17 MG/DL (ref 0.6–1.3)
EOSINOPHIL # BLD AUTO: 0.78 THOUSAND/ΜL (ref 0–0.61)
EOSINOPHIL NFR BLD AUTO: 7 % (ref 0–6)
ERYTHROCYTE [DISTWIDTH] IN BLOOD BY AUTOMATED COUNT: 15.1 % (ref 11.6–15.1)
GFR SERPL CREATININE-BSD FRML MDRD: 44 ML/MIN/1.73SQ M
GLUCOSE P FAST SERPL-MCNC: 95 MG/DL (ref 65–99)
HCT VFR BLD AUTO: 40.3 % (ref 34.8–46.1)
HDLC SERPL-MCNC: 44 MG/DL
HGB BLD-MCNC: 12.4 G/DL (ref 11.5–15.4)
IMM GRANULOCYTES # BLD AUTO: 0.03 THOUSAND/UL (ref 0–0.2)
IMM GRANULOCYTES NFR BLD AUTO: 0 % (ref 0–2)
LDLC SERPL CALC-MCNC: 139 MG/DL (ref 0–100)
LYMPHOCYTES # BLD AUTO: 3.3 THOUSANDS/ΜL (ref 0.6–4.47)
LYMPHOCYTES NFR BLD AUTO: 31 % (ref 14–44)
MCH RBC QN AUTO: 29 PG (ref 26.8–34.3)
MCHC RBC AUTO-ENTMCNC: 30.8 G/DL (ref 31.4–37.4)
MCV RBC AUTO: 94 FL (ref 82–98)
MONOCYTES # BLD AUTO: 0.74 THOUSAND/ΜL (ref 0.17–1.22)
MONOCYTES NFR BLD AUTO: 7 % (ref 4–12)
NEUTROPHILS # BLD AUTO: 5.89 THOUSANDS/ΜL (ref 1.85–7.62)
NEUTS SEG NFR BLD AUTO: 54 % (ref 43–75)
NRBC BLD AUTO-RTO: 0 /100 WBCS
PLATELET # BLD AUTO: 310 THOUSANDS/UL (ref 149–390)
PMV BLD AUTO: 10.2 FL (ref 8.9–12.7)
POTASSIUM SERPL-SCNC: 4.4 MMOL/L (ref 3.5–5.3)
PROT SERPL-MCNC: 7.5 G/DL (ref 6.4–8.2)
RBC # BLD AUTO: 4.28 MILLION/UL (ref 3.81–5.12)
SODIUM SERPL-SCNC: 139 MMOL/L (ref 136–145)
TRIGL SERPL-MCNC: 140 MG/DL
WBC # BLD AUTO: 10.83 THOUSAND/UL (ref 4.31–10.16)

## 2021-06-08 PROCEDURE — 80061 LIPID PANEL: CPT

## 2021-06-08 PROCEDURE — 80053 COMPREHEN METABOLIC PANEL: CPT

## 2021-06-08 PROCEDURE — 36415 COLL VENOUS BLD VENIPUNCTURE: CPT

## 2021-06-08 PROCEDURE — 85025 COMPLETE CBC W/AUTO DIFF WBC: CPT

## 2021-06-09 ENCOUNTER — HOSPITAL ENCOUNTER (OUTPATIENT)
Dept: CT IMAGING | Facility: HOSPITAL | Age: 81
Discharge: HOME | End: 2021-06-09
Payer: COMMERCIAL

## 2021-06-09 DIAGNOSIS — S09.90XA INJURY OF HEAD, INITIAL ENCOUNTER: ICD-10-CM

## 2021-06-09 PROCEDURE — 70450 CT HEAD/BRAIN W/O DYE: CPT

## 2021-06-18 ENCOUNTER — RA CDI HCC (OUTPATIENT)
Dept: OTHER | Facility: HOSPITAL | Age: 81
End: 2021-06-18

## 2021-06-18 NOTE — PROGRESS NOTES
[facility]  coding opportunities          Chart reviewed, no opportunity found: CHART REVIEWED, NO OPPORTUNITY FOUND                     Patients insurance company: [employer]

## 2021-06-21 ENCOUNTER — OFFICE VISIT (OUTPATIENT)
Dept: INTERNAL MEDICINE CLINIC | Facility: CLINIC | Age: 81
End: 2021-06-21
Payer: COMMERCIAL

## 2021-06-21 VITALS
WEIGHT: 139.8 LBS | RESPIRATION RATE: 14 BRPM | DIASTOLIC BLOOD PRESSURE: 54 MMHG | SYSTOLIC BLOOD PRESSURE: 132 MMHG | BODY MASS INDEX: 23.87 KG/M2 | TEMPERATURE: 99.1 F | HEIGHT: 64 IN | OXYGEN SATURATION: 92 % | HEART RATE: 64 BPM

## 2021-06-21 DIAGNOSIS — N18.32 STAGE 3B CHRONIC KIDNEY DISEASE (HCC): ICD-10-CM

## 2021-06-21 DIAGNOSIS — E78.2 MIXED HYPERLIPIDEMIA: ICD-10-CM

## 2021-06-21 DIAGNOSIS — I67.2 CEREBRAL ATHEROSCLEROSIS: ICD-10-CM

## 2021-06-21 DIAGNOSIS — I63.9 CEREBROVASCULAR ACCIDENT (CVA), UNSPECIFIED MECHANISM (HCC): Primary | ICD-10-CM

## 2021-06-21 DIAGNOSIS — I10 ESSENTIAL HYPERTENSION: ICD-10-CM

## 2021-06-21 PROCEDURE — 3075F SYST BP GE 130 - 139MM HG: CPT | Performed by: INTERNAL MEDICINE

## 2021-06-21 PROCEDURE — 3078F DIAST BP <80 MM HG: CPT | Performed by: INTERNAL MEDICINE

## 2021-06-21 PROCEDURE — 1036F TOBACCO NON-USER: CPT | Performed by: INTERNAL MEDICINE

## 2021-06-21 PROCEDURE — 1160F RVW MEDS BY RX/DR IN RCRD: CPT | Performed by: INTERNAL MEDICINE

## 2021-06-21 PROCEDURE — 99214 OFFICE O/P EST MOD 30 MIN: CPT | Performed by: INTERNAL MEDICINE

## 2021-06-21 RX ORDER — PRAVASTATIN SODIUM 40 MG
40 TABLET ORAL DAILY
Qty: 30 TABLET | Refills: 5 | Status: SHIPPED | OUTPATIENT
Start: 2021-06-21 | End: 2021-11-04 | Stop reason: HOSPADM

## 2021-06-21 NOTE — PROGRESS NOTES
Assessment/Plan:    Problem List Items Addressed This Visit        Cardiovascular and Mediastinum    Essential hypertension    Cerebral atherosclerosis       Genitourinary    Stage 3b chronic kidney disease ([facility] )       Other    Mixed hyperlipidemia    Relevant Medications    pravastatin (PRAVACHOL) 40 mg tablet      Other Visit Diagnoses     Cerebrovascular accident (CVA), unspecified mechanism ([facility] )    -  Primary    Relevant Medications    pravastatin (PRAVACHOL) 40 mg tablet           Diagnoses and all orders for this visit:    Cerebrovascular accident (CVA), unspecified mechanism ([facility] )  -     pravastatin (PRAVACHOL) 40 mg tablet; Take 1 tablet (40 mg total) by mouth daily    Mixed hyperlipidemia  -     pravastatin (PRAVACHOL) 40 mg tablet; Take 1 tablet (40 mg total) by mouth daily    Stage 3b chronic kidney disease ([facility] )    Essential hypertension    Cerebral atherosclerosis        No problem-specific Assessment & Plan notes found for this encounter  Subjective: The patient was seen and examined and noted to have issues with an elevated      Patient ID: Lian Baron is a 80 y o  female  The patient was seen and examined and noted to have issues with presyncopal symptoms that have improved since reducing the HCTZ  The patient has a history of CKD 3b and we will continue to watch her renal function  The patient had a mild decrease of her creatinine with the decrease in HCTZ  We will hold the course with the Lisinopril and Atenolol  We restarted the Pravastatin secondary to the expense noted by the patient with the use if Atorvastatin  She has a history of CAD and her CT was stable and there was no other concerns  The following portions of the patient's history were reviewed and updated as appropriate:   She has a past medical history of Acute CVA (cerebrovascular accident) ([facility] ), Dementia ([facility] ), Gout, Hypertension, Osteoporosis, and Shingles  ,  does not have any pertinent problems on file ,   has a past surgical history that includes Appendectomy; Hysterectomy; Tubal ligation; and Uterine fibroid surgery  ,  family history includes Hypertension in her father; Other in her father; Stroke in her mother  ,   reports that she has quit smoking  She has never used smokeless tobacco  She reports that she does not drink alcohol and does not use drugs  ,  is allergic to oxycodone-acetaminophen     Current Outpatient Medications   Medication Sig Dispense Refill    allopurinol (ZYLOPRIM) 100 mg tablet Take 1 tablet (100 mg total) by mouth daily 90 tablet 1    atenolol (TENORMIN) 50 mg tablet Take 1 tablet (50 mg total) by mouth daily 90 tablet 1    clopidogrel (PLAVIX) 75 mg tablet One Tab PO QDay Brand Necessary 90 tablet 1    donepezil (ARICEPT) 5 mg tablet Take 1 tablet (5 mg total) by mouth daily at bedtime 90 tablet 3    hydrochlorothiazide (HYDRODIURIL) 25 mg tablet Take 1 tablet (25 mg total) by mouth daily 90 tablet 1    lisinopril (ZESTRIL) 40 mg tablet Take 1 tablet (40 mg total) by mouth daily 90 tablet 1    Multiple Vitamins-Minerals (MULTI ADULT GUMMIES) CHEW Chew 1 tablet daily      OMEGA-3 FATTY ACIDS-VITAMIN E PO Take 1,000 mg by mouth      pentoxifylline (TRENtal) 400 mg ER tablet Take 1 tablet (400 mg total) by mouth 3 (three) times a day with meals 90 tablet 1    pravastatin (PRAVACHOL) 40 mg tablet Take 1 tablet (40 mg total) by mouth daily 30 tablet 5    valACYclovir (VALTREX) 1,000 mg tablet Take 1 tablet (1,000 mg total) by mouth 2 (two) times a day for 10 days 20 tablet 0     No current facility-administered medications for this visit  Review of Systems   Constitutional: Negative for chills, fatigue and fever  HENT: Negative  Respiratory: Negative for cough, chest tightness and shortness of breath  Cardiovascular: Negative for chest pain, palpitations and leg swelling  Gastrointestinal: Negative for abdominal pain, constipation, diarrhea, nausea and vomiting  Genitourinary: Negative  Musculoskeletal: Negative for arthralgias, back pain and myalgias  Skin: Negative  Neurological: Negative  Psychiatric/Behavioral: Negative  Objective:  Vitals:    06/21/21 0916   BP: 132/54   BP Location: Left arm   Patient Position: Sitting   Cuff Size: Large   Pulse: 64   Resp: 14   Temp: 99 1 °F (37 3 °C)   SpO2: 92%   Weight: 63 4 kg (139 lb 12 8 oz)   Height: 5' 4" (1 626 m)     Body mass index is 24 kg/m²  Physical Exam  Vitals and nursing note reviewed  Constitutional:       Appearance: She is well-developed  HENT:      Head: Normocephalic and atraumatic  Eyes:      Pupils: Pupils are equal, round, and reactive to light  Cardiovascular:      Rate and Rhythm: Normal rate and regular rhythm  Heart sounds: Normal heart sounds  No murmur heard  Pulmonary:      Effort: Pulmonary effort is normal  No respiratory distress  Breath sounds: Normal breath sounds  No wheezing  Abdominal:      General: Bowel sounds are normal       Palpations: Abdomen is soft  Tenderness: There is no abdominal tenderness  Musculoskeletal:         General: Normal range of motion  Cervical back: Normal range of motion and neck supple  Skin:     General: Skin is warm and dry  Neurological:      Mental Status: She is alert and oriented to person, place, and time            PHQ-9 Depression Screening    PHQ-9:   Frequency of the following problems over the past two weeks:

## 2021-08-10 ENCOUNTER — TELEPHONE (OUTPATIENT)
Dept: INTERNAL MEDICINE CLINIC | Facility: CLINIC | Age: 81
End: 2021-08-10

## 2021-08-10 ENCOUNTER — OFFICE VISIT (OUTPATIENT)
Dept: INTERNAL MEDICINE CLINIC | Facility: CLINIC | Age: 81
End: 2021-08-10
Payer: COMMERCIAL

## 2021-08-10 VITALS
DIASTOLIC BLOOD PRESSURE: 52 MMHG | TEMPERATURE: 100.1 F | BODY MASS INDEX: 23.22 KG/M2 | SYSTOLIC BLOOD PRESSURE: 118 MMHG | HEART RATE: 82 BPM | RESPIRATION RATE: 14 BRPM | WEIGHT: 136 LBS | HEIGHT: 64 IN | OXYGEN SATURATION: 91 %

## 2021-08-10 DIAGNOSIS — J18.9 PNEUMONIA OF RIGHT LOWER LOBE DUE TO INFECTIOUS ORGANISM: Primary | ICD-10-CM

## 2021-08-10 DIAGNOSIS — R53.82 CHRONIC FATIGUE: ICD-10-CM

## 2021-08-10 PROCEDURE — U0005 INFEC AGEN DETEC AMPLI PROBE: HCPCS | Performed by: INTERNAL MEDICINE

## 2021-08-10 PROCEDURE — 99213 OFFICE O/P EST LOW 20 MIN: CPT | Performed by: INTERNAL MEDICINE

## 2021-08-10 PROCEDURE — U0003 INFECTIOUS AGENT DETECTION BY NUCLEIC ACID (DNA OR RNA); SEVERE ACUTE RESPIRATORY SYNDROME CORONAVIRUS 2 (SARS-COV-2) (CORONAVIRUS DISEASE [COVID-19]), AMPLIFIED PROBE TECHNIQUE, MAKING USE OF HIGH THROUGHPUT TECHNOLOGIES AS DESCRIBED BY CMS-2020-01-R: HCPCS | Performed by: INTERNAL MEDICINE

## 2021-08-10 RX ORDER — LEVOFLOXACIN 500 MG/1
500 TABLET, FILM COATED ORAL EVERY 24 HOURS
Qty: 7 TABLET | Refills: 0 | Status: SHIPPED | OUTPATIENT
Start: 2021-08-10 | End: 2021-08-17

## 2021-08-10 RX ORDER — AMLODIPINE BESYLATE 5 MG/1
TABLET ORAL
COMMUNITY
Start: 2021-07-08 | End: 2021-08-10

## 2021-08-10 NOTE — PROGRESS NOTES
Addended by: ANAND VARNER on: 3/13/2020 06:00 PM     Modules accepted: Orders     Assessment/Plan:    Problem List Items Addressed This Visit     None      Visit Diagnoses     Pneumonia of right lower lobe due to infectious organism    -  Primary    Relevant Medications    levofloxacin (LEVAQUIN) 500 mg tablet    Other Relevant Orders    CBC and differential    XR chest pa & lateral    Novel Coronavirus (Covid-19),PCR [facility] - Collected in Office    Chronic fatigue        Relevant Orders    Lyme Antibody Profile with reflex to WB           Diagnoses and all orders for this visit:    Pneumonia of right lower lobe due to infectious organism  -     CBC and differential; Future  -     XR chest pa & lateral; Future  -     levofloxacin (LEVAQUIN) 500 mg tablet; Take 1 tablet (500 mg total) by mouth every 24 hours for 7 days  -     Novel Coronavirus (Covid-19),PCR [facility] - Collected in Office    Chronic fatigue  -     Lyme Antibody Profile with reflex to WB; Future    Other orders  -     Discontinue: amLODIPine (NORVASC) 5 mg tablet        No problem-specific Assessment & Plan notes found for this encounter  Subjective: Fatigue and thick sputum     Patient ID: Cassandra Maddox is a 80 y o  female  The patient was seen and examined and noted to have issues with tenacious sputum and SOB  The patient states that she has had symptoms for 3 days and notes that she is doing poorly and has no appetite  The following portions of the patient's history were reviewed and updated as appropriate:   She has a past medical history of Acute CVA (cerebrovascular accident) ([address] ), Dementia ([address] ), Gout, Hypertension, Osteoporosis, and Shingles  ,  does not have any pertinent problems on file  ,   has a past surgical history that includes Appendectomy; Hysterectomy; Tubal ligation; and Uterine fibroid surgery  ,  family history includes Hypertension in her father; Other in her father; Stroke in her mother  ,   reports that she quit smoking about 59 years ago  Her smoking use included cigarettes   She has a 0 75 pack-year smoking history  She has never used smokeless tobacco  She reports that she does not drink alcohol and does not use drugs  ,  is allergic to oxycodone-acetaminophen     Current Outpatient Medications   Medication Sig Dispense Refill    allopurinol (ZYLOPRIM) 100 mg tablet Take 1 tablet (100 mg total) by mouth daily 90 tablet 1    atenolol (TENORMIN) 50 mg tablet Take 1 tablet (50 mg total) by mouth daily 90 tablet 1    clopidogrel (PLAVIX) 75 mg tablet One Tab PO QDay Brand Necessary 90 tablet 1    donepezil (ARICEPT) 5 mg tablet Take 1 tablet (5 mg total) by mouth daily at bedtime 90 tablet 3    hydrochlorothiazide (HYDRODIURIL) 25 mg tablet Take 1 tablet (25 mg total) by mouth daily 90 tablet 1    lisinopril (ZESTRIL) 40 mg tablet Take 1 tablet (40 mg total) by mouth daily 90 tablet 1    Multiple Vitamins-Minerals (MULTI ADULT GUMMIES) CHEW Chew 1 tablet daily      OMEGA-3 FATTY ACIDS-VITAMIN E PO Take 1,000 mg by mouth      pentoxifylline (TRENtal) 400 mg ER tablet Take 1 tablet (400 mg total) by mouth 3 (three) times a day with meals 90 tablet 1    pravastatin (PRAVACHOL) 40 mg tablet Take 1 tablet (40 mg total) by mouth daily 30 tablet 5    levofloxacin (LEVAQUIN) 500 mg tablet Take 1 tablet (500 mg total) by mouth every 24 hours for 7 days 7 tablet 0    valACYclovir (VALTREX) 1,000 mg tablet Take 1 tablet (1,000 mg total) by mouth 2 (two) times a day for 10 days 20 tablet 0     No current facility-administered medications for this visit  Review of Systems   Constitutional: Positive for activity change, appetite change and fatigue  Negative for chills and fever  HENT: Negative  Respiratory: Positive for cough  Negative for chest tightness and shortness of breath  Cardiovascular: Negative for chest pain, palpitations and leg swelling  Gastrointestinal: Negative for abdominal pain, constipation, diarrhea, nausea and vomiting  Genitourinary: Negative      Musculoskeletal: Negative for arthralgias, back pain and myalgias  Skin: Negative  Neurological: Negative  Psychiatric/Behavioral: Negative  Objective:  Vitals:    08/10/21 1557   BP: 118/52   BP Location: Left arm   Patient Position: Sitting   Cuff Size: Standard   Pulse: 82   Resp: 14   Temp: 100 1 °F (37 8 °C)   SpO2: 91%   Weight: 61 7 kg (136 lb)   Height: 5' 4" (1 626 m)     Body mass index is 23 34 kg/m²  Physical Exam  Vitals and nursing note reviewed  Constitutional:       Appearance: She is well-developed  HENT:      Head: Normocephalic and atraumatic  Eyes:      Pupils: Pupils are equal, round, and reactive to light  Cardiovascular:      Rate and Rhythm: Normal rate and regular rhythm  Heart sounds: Normal heart sounds  No murmur heard  Pulmonary:      Effort: Pulmonary effort is normal  No respiratory distress  Breath sounds: Examination of the right-lower field reveals rhonchi and rales  Rhonchi and rales present  No wheezing  Abdominal:      General: Bowel sounds are normal       Palpations: Abdomen is soft  Tenderness: There is no abdominal tenderness  Musculoskeletal:         General: Normal range of motion  Cervical back: Normal range of motion and neck supple  Skin:     General: Skin is warm and dry  Neurological:      Mental Status: She is alert and oriented to person, place, and time            PHQ-9 Depression Screening    PHQ-9:   Frequency of the following problems over the past two weeks:

## 2021-08-11 ENCOUNTER — APPOINTMENT (OUTPATIENT)
Dept: LAB | Facility: CLINIC | Age: 81
End: 2021-08-11
Payer: COMMERCIAL

## 2021-08-11 ENCOUNTER — APPOINTMENT (OUTPATIENT)
Dept: RADIOLOGY | Facility: CLINIC | Age: 81
End: 2021-08-11
Payer: COMMERCIAL

## 2021-08-11 DIAGNOSIS — R53.82 CHRONIC FATIGUE: ICD-10-CM

## 2021-08-11 DIAGNOSIS — J18.9 PNEUMONIA OF RIGHT LOWER LOBE DUE TO INFECTIOUS ORGANISM: ICD-10-CM

## 2021-08-11 LAB
BASOPHILS # BLD AUTO: 0.07 THOUSANDS/ΜL (ref 0–0.1)
BASOPHILS NFR BLD AUTO: 1 % (ref 0–1)
EOSINOPHIL # BLD AUTO: 0.32 THOUSAND/ΜL (ref 0–0.61)
EOSINOPHIL NFR BLD AUTO: 2 % (ref 0–6)
ERYTHROCYTE [DISTWIDTH] IN BLOOD BY AUTOMATED COUNT: 13.8 % (ref 11.6–15.1)
HCT VFR BLD AUTO: 40.4 % (ref 34.8–46.1)
HGB BLD-MCNC: 12.5 G/DL (ref 11.5–15.4)
IMM GRANULOCYTES # BLD AUTO: 0.09 THOUSAND/UL (ref 0–0.2)
IMM GRANULOCYTES NFR BLD AUTO: 1 % (ref 0–2)
LYMPHOCYTES # BLD AUTO: 1.83 THOUSANDS/ΜL (ref 0.6–4.47)
LYMPHOCYTES NFR BLD AUTO: 12 % (ref 14–44)
MCH RBC QN AUTO: 29.2 PG (ref 26.8–34.3)
MCHC RBC AUTO-ENTMCNC: 30.9 G/DL (ref 31.4–37.4)
MCV RBC AUTO: 94 FL (ref 82–98)
MONOCYTES # BLD AUTO: 1.01 THOUSAND/ΜL (ref 0.17–1.22)
MONOCYTES NFR BLD AUTO: 7 % (ref 4–12)
NEUTROPHILS # BLD AUTO: 11.86 THOUSANDS/ΜL (ref 1.85–7.62)
NEUTS SEG NFR BLD AUTO: 77 % (ref 43–75)
NRBC BLD AUTO-RTO: 0 /100 WBCS
PLATELET # BLD AUTO: 400 THOUSANDS/UL (ref 149–390)
PMV BLD AUTO: 10.6 FL (ref 8.9–12.7)
RBC # BLD AUTO: 4.28 MILLION/UL (ref 3.81–5.12)
WBC # BLD AUTO: 15.18 THOUSAND/UL (ref 4.31–10.16)

## 2021-08-11 PROCEDURE — 86618 LYME DISEASE ANTIBODY: CPT

## 2021-08-11 PROCEDURE — 36415 COLL VENOUS BLD VENIPUNCTURE: CPT

## 2021-08-11 PROCEDURE — 85025 COMPLETE CBC W/AUTO DIFF WBC: CPT

## 2021-08-11 PROCEDURE — 71046 X-RAY EXAM CHEST 2 VIEWS: CPT

## 2021-08-12 ENCOUNTER — OFFICE VISIT (OUTPATIENT)
Dept: INTERNAL MEDICINE CLINIC | Facility: CLINIC | Age: 81
End: 2021-08-12
Payer: COMMERCIAL

## 2021-08-12 VITALS
TEMPERATURE: 100.8 F | OXYGEN SATURATION: 90 % | HEIGHT: 64 IN | RESPIRATION RATE: 16 BRPM | SYSTOLIC BLOOD PRESSURE: 130 MMHG | HEART RATE: 108 BPM | WEIGHT: 133.4 LBS | DIASTOLIC BLOOD PRESSURE: 70 MMHG | BODY MASS INDEX: 22.77 KG/M2

## 2021-08-12 DIAGNOSIS — J18.9 PNEUMONIA OF RIGHT LOWER LOBE DUE TO INFECTIOUS ORGANISM: Primary | ICD-10-CM

## 2021-08-12 LAB
B BURGDOR IGG+IGM SER-ACNC: -10
SARS-COV-2 RNA RESP QL NAA+PROBE: NEGATIVE

## 2021-08-12 PROCEDURE — 99213 OFFICE O/P EST LOW 20 MIN: CPT | Performed by: INTERNAL MEDICINE

## 2021-08-12 NOTE — PROGRESS NOTES
Assessment/Plan:    Problem List Items Addressed This Visit     None      Visit Diagnoses     Pneumonia of right lower lobe due to infectious organism    -  Primary           Diagnoses and all orders for this visit:    Pneumonia of right lower lobe due to infectious organism        No problem-specific Assessment & Plan notes found for this encounter  Subjective: The patient was noted to have issues with a RLL pneumonia  CXR negative, but elevated WBC     Patient ID: Yumiko Lee is a 80 y o  female  The patient is noted to have an elevated WBC with cough and SOB  The patient is also noted to have an elevated Temp  We reviewed labs and she will continue the Levaquin, but at 250 mg and well follow up by telephone next week  The following portions of the patient's history were reviewed and updated as appropriate:   She has a past medical history of Acute CVA (cerebrovascular accident) ([facility] ), Dementia ([facility] [facility] ), Gout, Hypertension, Osteoporosis, and Shingles  ,  does not have any pertinent problems on file  ,   has a past surgical history that includes Appendectomy; Hysterectomy; Tubal ligation; and Uterine fibroid surgery  ,  family history includes Hypertension in her father; Other in her father; Stroke in her mother  ,   reports that she quit smoking about 59 years ago  Her smoking use included cigarettes  She has a 0 75 pack-year smoking history  She has never used smokeless tobacco  She reports that she does not drink alcohol and does not use drugs  ,  is allergic to oxycodone-acetaminophen     Current Outpatient Medications   Medication Sig Dispense Refill    allopurinol (ZYLOPRIM) 100 mg tablet Take 1 tablet (100 mg total) by mouth daily 90 tablet 1    atenolol (TENORMIN) 50 mg tablet Take 1 tablet (50 mg total) by mouth daily 90 tablet 1    clopidogrel (PLAVIX) 75 mg tablet One Tab PO QDay Brand Necessary 90 tablet 1    donepezil (ARICEPT) 5 mg tablet Take 1 tablet (5 mg total) by mouth daily at bedtime 90 tablet 3    hydrochlorothiazide (HYDRODIURIL) 25 mg tablet Take 1 tablet (25 mg total) by mouth daily 90 tablet 1    levofloxacin (LEVAQUIN) 500 mg tablet Take 1 tablet (500 mg total) by mouth every 24 hours for 7 days 7 tablet 0    lisinopril (ZESTRIL) 40 mg tablet Take 1 tablet (40 mg total) by mouth daily 90 tablet 1    Multiple Vitamins-Minerals (MULTI ADULT GUMMIES) CHEW Chew 1 tablet daily      OMEGA-3 FATTY ACIDS-VITAMIN E PO Take 1,000 mg by mouth      pentoxifylline (TRENtal) 400 mg ER tablet Take 1 tablet (400 mg total) by mouth 3 (three) times a day with meals 90 tablet 1    pravastatin (PRAVACHOL) 40 mg tablet Take 1 tablet (40 mg total) by mouth daily 30 tablet 5    valACYclovir (VALTREX) 1,000 mg tablet Take 1 tablet (1,000 mg total) by mouth 2 (two) times a day for 10 days (Patient not taking: Reported on 8/12/2021) 20 tablet 0     No current facility-administered medications for this visit  Review of Systems   Constitutional: Negative for chills, fatigue and fever  HENT: Negative  Respiratory: Negative for cough, chest tightness and shortness of breath  Cardiovascular: Negative for chest pain, palpitations and leg swelling  Gastrointestinal: Negative for abdominal pain, constipation, diarrhea, nausea and vomiting  Genitourinary: Negative  Musculoskeletal: Negative for arthralgias, back pain and myalgias  Skin: Negative  Neurological: Negative  Psychiatric/Behavioral: Negative  Objective:  Vitals:    08/12/21 1306   BP: 130/70   BP Location: Left arm   Patient Position: Sitting   Cuff Size: Standard   Pulse: (!) 108   Resp: 16   Temp: (!) 100 8 °F (38 2 °C)   SpO2: 90%   Weight: 60 5 kg (133 lb 6 4 oz)   Height: 5' 4" (1 626 m)     Body mass index is 22 9 kg/m²  Physical Exam  Vitals and nursing note reviewed  Constitutional:       Appearance: She is well-developed  HENT:      Head: Normocephalic and atraumatic     Eyes:      Pupils: Pupils are equal, round, and reactive to light  Cardiovascular:      Rate and Rhythm: Normal rate and regular rhythm  Heart sounds: Normal heart sounds  No murmur heard  Pulmonary:      Effort: Pulmonary effort is normal  No respiratory distress  Breath sounds: Normal breath sounds  No wheezing  Abdominal:      General: Bowel sounds are normal       Palpations: Abdomen is soft  Tenderness: There is no abdominal tenderness  Musculoskeletal:         General: Normal range of motion  Cervical back: Normal range of motion and neck supple  Skin:     General: Skin is warm and dry  Neurological:      Mental Status: She is alert and oriented to person, place, and time            PHQ-9 Depression Screening    PHQ-9:   Frequency of the following problems over the past two weeks:

## 2021-08-16 ENCOUNTER — TELEMEDICINE (OUTPATIENT)
Dept: INTERNAL MEDICINE CLINIC | Facility: CLINIC | Age: 81
End: 2021-08-16
Payer: COMMERCIAL

## 2021-08-16 VITALS — WEIGHT: 133 LBS | BODY MASS INDEX: 22.71 KG/M2 | HEIGHT: 64 IN

## 2021-08-16 DIAGNOSIS — J18.9 PNEUMONIA OF RIGHT LOWER LOBE DUE TO INFECTIOUS ORGANISM: Primary | ICD-10-CM

## 2021-08-16 PROCEDURE — 99213 OFFICE O/P EST LOW 20 MIN: CPT | Performed by: INTERNAL MEDICINE

## 2021-08-16 RX ORDER — BENZONATATE 100 MG/1
100 CAPSULE ORAL 3 TIMES DAILY PRN
Qty: 20 CAPSULE | Refills: 0 | Status: SHIPPED | OUTPATIENT
Start: 2021-08-16 | End: 2021-10-04

## 2021-08-16 RX ORDER — GUAIFENESIN 600 MG
1200 TABLET, EXTENDED RELEASE 12 HR ORAL EVERY 12 HOURS SCHEDULED
Qty: 28 TABLET | Refills: 0 | Status: SHIPPED | OUTPATIENT
Start: 2021-08-16 | End: 2021-08-30

## 2021-08-16 NOTE — PROGRESS NOTES
Virtual Regular Visit    Verification of patient location:    Patient is located in the following state in which I hold an active license [place]      Assessment/Plan:    Problem List Items Addressed This Visit     None      Visit Diagnoses     Pneumonia of right lower lobe due to infectious organism    -  Primary    Relevant Medications    benzonatate (TESSALON PERLES) 100 mg capsule    guaiFENesin (MUCINEX) 600 mg 12 hr tablet    Other Relevant Orders    XR chest pa & lateral    CBC and differential               Reason for visit is   Chief Complaint   Patient presents with   Brooke Cottrell Virtual Brief Visit     [address]  Pneumonia f/u  Pt states difficulty sleeping: coughing  Pt is still taking antibiotic   Virtual Regular Visit        Encounter provider Fabiano Tapia DO    Provider located at [address]  [address] [place] [id number]      Recent Visits  Date Type Provider Dept   08/12/21 Office Visit DO [facility]   08/10/21 Telephone Barbie Ashby, [address] [place] [address] [facility]   08/10/21 Office Visit Fabiano Tapia DO [facility] [place] [facility]   Showing recent visits within past 7 days and meeting all other requirements  Today's Visits  Date Type Provider Dept   08/16/21 Telemedicine DO Viktor Lazaro [place] today's visits and meeting all other requirements  Future Appointments  No visits were found meeting these conditions  Showing future appointments within next 150 days and meeting all other requirements       The patient was identified by name and date of birth  Lonny Denise was informed that this is a telemedicine visit and that the visit is being conducted through [address] [place] [address] and patient was informed that this is not a secure, HIPAA-compliant platform  She agrees to proceed     My office door was closed  No one else was in the room    She acknowledged consent and understanding of privacy and security of the video platform  The patient has agreed to participate and understands they can discontinue the visit at any time  Patient is aware this is a billable service  Subjective  Charlotte Yee is a 80 y o  female  With RLL pneumonia  The patient still notes issues with cough and secretions  However, she is now afebrile and her symptoms have improved markedly  The patient states that she is doing well otherwise and note no other issues at this time  We discussed and she is amendable follow up with CXR and CBC         Past Medical History:   Diagnosis Date    [facility]) [facility]     Dementia ([facility] [facility] )     Gout     Hypertension     Osteoporosis     Shingles        Past Surgical History:   Procedure Laterality Date    APPENDECTOMY      HYSTERECTOMY      TUBAL LIGATION      UTERINE FIBROID SURGERY         Current Outpatient Medications   Medication Sig Dispense Refill    allopurinol (ZYLOPRIM) 100 mg tablet Take 1 tablet (100 mg total) by mouth daily 90 tablet 1    atenolol (TENORMIN) 50 mg tablet Take 1 tablet (50 mg total) by mouth daily 90 tablet 1    clopidogrel (PLAVIX) 75 mg tablet One Tab PO QDay Brand Necessary 90 tablet 1    donepezil (ARICEPT) 5 mg tablet Take 1 tablet (5 mg total) by mouth daily at bedtime 90 tablet 3    hydrochlorothiazide (HYDRODIURIL) 25 mg tablet Take 1 tablet (25 mg total) by mouth daily 90 tablet 1    levofloxacin (LEVAQUIN) 500 mg tablet Take 1 tablet (500 mg total) by mouth every 24 hours for 7 days 7 tablet 0    lisinopril (ZESTRIL) 40 mg tablet Take 1 tablet (40 mg total) by mouth daily 90 tablet 1    Multiple Vitamins-Minerals (MULTI ADULT GUMMIES) CHEW Chew 1 tablet daily      OMEGA-3 FATTY ACIDS-VITAMIN E PO Take 1,000 mg by mouth      pentoxifylline (TRENtal) 400 mg ER tablet Take 1 tablet (400 mg total) by mouth 3 (three) times a day with meals 90 tablet 1    pravastatin (PRAVACHOL) 40 mg tablet Take 1 tablet (40 mg total) by mouth daily 30 tablet 5    benzonatate (TESSALON PERLES) 100 mg capsule Take 1 capsule (100 mg total) by mouth 3 (three) times a day as needed for cough 20 capsule 0    guaiFENesin (MUCINEX) 600 mg 12 hr tablet Take 2 tablets (1,200 mg total) by mouth every 12 (twelve) hours for 14 days Stay Well Hydrated 28 tablet 0    valACYclovir (VALTREX) 1,000 mg tablet Take 1 tablet (1,000 mg total) by mouth 2 (two) times a day for 10 days (Patient not taking: Reported on 8/12/2021) 20 tablet 0     No current facility-administered medications for this visit  Allergies   Allergen Reactions    Oxycodone-Acetaminophen Rash       Review of Systems   Constitutional: Negative for chills, fatigue and fever  HENT: Positive for postnasal drip  Respiratory: Positive for cough  Negative for chest tightness and shortness of breath  Cardiovascular: Negative for chest pain, palpitations and leg swelling  Gastrointestinal: Negative for abdominal pain, constipation, diarrhea, nausea and vomiting  Genitourinary: Negative  Musculoskeletal: Negative for arthralgias, back pain and myalgias  Skin: Negative  Neurological: Negative  Psychiatric/Behavioral: Negative  Video Exam    Vitals:    08/16/21 1001   Weight: 60 3 kg (133 lb)   Height: 5' 4" (1 626 m)       Physical Exam  Vitals and nursing note reviewed  Constitutional:       Appearance: She is well-developed  HENT:      Head: Normocephalic and atraumatic  Eyes:      Pupils: Pupils are equal, round, and reactive to light  Pulmonary:      Effort: Pulmonary effort is normal       Breath sounds: Normal breath sounds  Skin:     General: Skin is warm  Neurological:      Mental Status: She is alert and oriented to person, place, and time  I spent 15 minutes directly with the patient during this visit    VIRTUAL VISIT 605 Ariella Rivera verbally agrees to participate in [employer]   Pt is aware that [employer] could be limited without vital signs or the ability to perform a full hands-on physical exam  Shagufta ANALI Eva understands she or the provider may request at any time to terminate the video visit and request the patient to seek care or treatment in person

## 2021-08-18 ENCOUNTER — APPOINTMENT (OUTPATIENT)
Dept: RADIOLOGY | Facility: CLINIC | Age: 81
End: 2021-08-18
Payer: COMMERCIAL

## 2021-08-18 ENCOUNTER — APPOINTMENT (OUTPATIENT)
Dept: LAB | Facility: CLINIC | Age: 81
End: 2021-08-18
Payer: COMMERCIAL

## 2021-08-18 DIAGNOSIS — J18.9 PNEUMONIA OF RIGHT LOWER LOBE DUE TO INFECTIOUS ORGANISM: ICD-10-CM

## 2021-08-18 LAB
BASOPHILS # BLD AUTO: 0.08 THOUSANDS/ΜL (ref 0–0.1)
BASOPHILS NFR BLD AUTO: 1 % (ref 0–1)
EOSINOPHIL # BLD AUTO: 0.23 THOUSAND/ΜL (ref 0–0.61)
EOSINOPHIL NFR BLD AUTO: 2 % (ref 0–6)
ERYTHROCYTE [DISTWIDTH] IN BLOOD BY AUTOMATED COUNT: 13.7 % (ref 11.6–15.1)
HCT VFR BLD AUTO: 41.2 % (ref 34.8–46.1)
HGB BLD-MCNC: 12.7 G/DL (ref 11.5–15.4)
IMM GRANULOCYTES # BLD AUTO: 0.1 THOUSAND/UL (ref 0–0.2)
IMM GRANULOCYTES NFR BLD AUTO: 1 % (ref 0–2)
LYMPHOCYTES # BLD AUTO: 1.93 THOUSANDS/ΜL (ref 0.6–4.47)
LYMPHOCYTES NFR BLD AUTO: 14 % (ref 14–44)
MCH RBC QN AUTO: 28.5 PG (ref 26.8–34.3)
MCHC RBC AUTO-ENTMCNC: 30.8 G/DL (ref 31.4–37.4)
MCV RBC AUTO: 93 FL (ref 82–98)
MONOCYTES # BLD AUTO: 0.94 THOUSAND/ΜL (ref 0.17–1.22)
MONOCYTES NFR BLD AUTO: 7 % (ref 4–12)
NEUTROPHILS # BLD AUTO: 10.38 THOUSANDS/ΜL (ref 1.85–7.62)
NEUTS SEG NFR BLD AUTO: 75 % (ref 43–75)
NRBC BLD AUTO-RTO: 0 /100 WBCS
PLATELET # BLD AUTO: 376 THOUSANDS/UL (ref 149–390)
PMV BLD AUTO: 9.9 FL (ref 8.9–12.7)
RBC # BLD AUTO: 4.45 MILLION/UL (ref 3.81–5.12)
WBC # BLD AUTO: 13.66 THOUSAND/UL (ref 4.31–10.16)

## 2021-08-18 PROCEDURE — 36415 COLL VENOUS BLD VENIPUNCTURE: CPT

## 2021-08-18 PROCEDURE — 85025 COMPLETE CBC W/AUTO DIFF WBC: CPT

## 2021-08-18 PROCEDURE — 71046 X-RAY EXAM CHEST 2 VIEWS: CPT

## 2021-08-23 ENCOUNTER — TELEPHONE (OUTPATIENT)
Dept: INTERNAL MEDICINE CLINIC | Facility: CLINIC | Age: 81
End: 2021-08-23

## 2021-08-23 NOTE — TELEPHONE ENCOUNTER
PT called said you are currently treating her for pneumonia  Canceled her appt on 9/29 because she said she is going to [address] be able to come  I was able to reschedule her for 9/15  Pt called back 5 minutes later and said she doesn't want to wait that long to get checked  Pt asked me to message you and ask if there is any day within the next two weeks  Please advise

## 2021-08-23 NOTE — TELEPHONE ENCOUNTER
You already have Leisa Jeffers scheduled for 9am on Thursday and then Cristiane Lloyd May at 9:45  Would you like me to have her follow after that appt?

## 2021-08-26 ENCOUNTER — OFFICE VISIT (OUTPATIENT)
Dept: INTERNAL MEDICINE CLINIC | Facility: CLINIC | Age: 81
End: 2021-08-26
Payer: COMMERCIAL

## 2021-08-26 VITALS
HEART RATE: 121 BPM | WEIGHT: 130 LBS | SYSTOLIC BLOOD PRESSURE: 102 MMHG | OXYGEN SATURATION: 92 % | DIASTOLIC BLOOD PRESSURE: 58 MMHG | HEIGHT: 64 IN | TEMPERATURE: 98.7 F | RESPIRATION RATE: 14 BRPM | BODY MASS INDEX: 22.2 KG/M2

## 2021-08-26 DIAGNOSIS — I10 ESSENTIAL HYPERTENSION: ICD-10-CM

## 2021-08-26 DIAGNOSIS — R42 VERTIGO: Primary | ICD-10-CM

## 2021-08-26 DIAGNOSIS — J18.9 PNEUMONIA OF RIGHT LOWER LOBE DUE TO INFECTIOUS ORGANISM: ICD-10-CM

## 2021-08-26 PROCEDURE — 1160F RVW MEDS BY RX/DR IN RCRD: CPT | Performed by: INTERNAL MEDICINE

## 2021-08-26 PROCEDURE — 1036F TOBACCO NON-USER: CPT | Performed by: INTERNAL MEDICINE

## 2021-08-26 PROCEDURE — 3074F SYST BP LT 130 MM HG: CPT | Performed by: INTERNAL MEDICINE

## 2021-08-26 PROCEDURE — 3078F DIAST BP <80 MM HG: CPT | Performed by: INTERNAL MEDICINE

## 2021-08-26 PROCEDURE — 99214 OFFICE O/P EST MOD 30 MIN: CPT | Performed by: INTERNAL MEDICINE

## 2021-08-26 RX ORDER — MECLIZINE HYDROCHLORIDE 25 MG/1
25 TABLET ORAL 3 TIMES DAILY PRN
Qty: 30 TABLET | Refills: 0 | Status: SHIPPED | OUTPATIENT
Start: 2021-08-26

## 2021-08-26 NOTE — PROGRESS NOTES
Assessment/Plan:    Problem List Items Addressed This Visit     None      Visit Diagnoses     Vertigo    -  Primary    Relevant Medications    meclizine (ANTIVERT) 25 mg tablet    Other Relevant Orders    XR chest pa & lateral    Pneumonia of right lower lobe due to infectious organism        Relevant Orders    CBC and differential           Diagnoses and all orders for this visit:    Vertigo  -     XR chest pa & lateral; Future  -     meclizine (ANTIVERT) 25 mg tablet; Take 1 tablet (25 mg total) by mouth 3 (three) times a day as needed for dizziness    Pneumonia of right lower lobe due to infectious organism  -     CBC and differential; Future        No problem-specific Assessment & Plan notes found for this encounter  Subjective: The patient notes some mild vertigo vs orthstatic symptoms  Patient ID: Tierney Blackwell is a 80 y o  female  The patient states that she is doing much better and states that she is having reduced SOB and cough  The patient states that she has no fevers or chills, but has had some episodic dizziness  It does not sound like orthostatic Bps  However, the patient notes that this occurs on a regular basis  The patient states that there are no other issues at this time  The following portions of the patient's history were reviewed and updated as appropriate:   She has a past medical history of Acute CVA (cerebrovascular accident) ([facility] [address] ), Dementia ([facility] [address] ), Gout, Hypertension, Osteoporosis, and Shingles  ,  does not have any pertinent problems on file  ,   has a past surgical history that includes Appendectomy; Hysterectomy; Tubal ligation; and Uterine fibroid surgery  ,  family history includes Hypertension in her father; Other in her father; Stroke in her mother  ,   reports that she quit smoking about 59 years ago  Her smoking use included cigarettes  She has a 0 75 pack-year smoking history   She has never used smokeless tobacco  She reports that she does not drink alcohol and does not use drugs  ,  is allergic to oxycodone-acetaminophen     Current Outpatient Medications   Medication Sig Dispense Refill    allopurinol (ZYLOPRIM) 100 mg tablet Take 1 tablet (100 mg total) by mouth daily 90 tablet 1    atenolol (TENORMIN) 50 mg tablet Take 1 tablet (50 mg total) by mouth daily 90 tablet 1    benzonatate (TESSALON PERLES) 100 mg capsule Take 1 capsule (100 mg total) by mouth 3 (three) times a day as needed for cough 20 capsule 0    clopidogrel (PLAVIX) 75 mg tablet One Tab PO QDay Brand Necessary 90 tablet 1    donepezil (ARICEPT) 5 mg tablet Take 1 tablet (5 mg total) by mouth daily at bedtime 90 tablet 3    guaiFENesin (MUCINEX) 600 mg 12 hr tablet Take 2 tablets (1,200 mg total) by mouth every 12 (twelve) hours for 14 days Stay Well Hydrated (Patient taking differently: Take 600 mg by mouth every 12 (twelve) hours Stay Well Hydrated) 28 tablet 0    hydrochlorothiazide (HYDRODIURIL) 25 mg tablet Take 1 tablet (25 mg total) by mouth daily 90 tablet 1    lisinopril (ZESTRIL) 40 mg tablet Take 1 tablet (40 mg total) by mouth daily 90 tablet 1    Multiple Vitamins-Minerals (MULTI ADULT GUMMIES) CHEW Chew 1 tablet daily      OMEGA-3 FATTY ACIDS-VITAMIN E PO Take 1,000 mg by mouth      pentoxifylline (TRENtal) 400 mg ER tablet Take 1 tablet (400 mg total) by mouth 3 (three) times a day with meals 90 tablet 1    pravastatin (PRAVACHOL) 40 mg tablet Take 1 tablet (40 mg total) by mouth daily 30 tablet 5    meclizine (ANTIVERT) 25 mg tablet Take 1 tablet (25 mg total) by mouth 3 (three) times a day as needed for dizziness 30 tablet 0    valACYclovir (VALTREX) 1,000 mg tablet Take 1 tablet (1,000 mg total) by mouth 2 (two) times a day for 10 days (Patient not taking: Reported on 8/12/2021) 20 tablet 0     No current facility-administered medications for this visit  Review of Systems   Constitutional: Negative for chills, fatigue and fever  HENT: Negative      Respiratory: Negative for cough, chest tightness and shortness of breath  Cardiovascular: Negative for chest pain, palpitations and leg swelling  Gastrointestinal: Negative for abdominal pain, constipation, diarrhea, nausea and vomiting  Genitourinary: Negative  Musculoskeletal: Negative for arthralgias, back pain and myalgias  Skin: Negative  Neurological: Negative  Psychiatric/Behavioral: Negative  Objective:  Vitals:    08/26/21 1015 08/26/21 1047 08/26/21 1048 08/26/21 1049   BP: 134/68 130/66 120/72 102/58   BP Location: Left arm Right arm Right arm Right arm   Patient Position: Sitting Supine Sitting Standing   Cuff Size: Standard Standard Standard Standard   Pulse: 82 84 98 (!) 121   Resp: 14      Temp: 98 7 °F (37 1 °C)      SpO2: 92%      Weight: 59 kg (130 lb)      Height: 5' 4" (1 626 m)        Body mass index is 22 31 kg/m²  Physical Exam  Vitals and nursing note reviewed  Constitutional:       Appearance: She is well-developed  HENT:      Head: Normocephalic and atraumatic  Eyes:      Pupils: Pupils are equal, round, and reactive to light  Cardiovascular:      Rate and Rhythm: Normal rate and regular rhythm  Heart sounds: Normal heart sounds  No murmur heard  Pulmonary:      Effort: Pulmonary effort is normal  No respiratory distress  Breath sounds: Normal breath sounds  No wheezing  Abdominal:      General: Bowel sounds are normal       Palpations: Abdomen is soft  Tenderness: There is no abdominal tenderness  Musculoskeletal:         General: Normal range of motion  Cervical back: Normal range of motion and neck supple  Skin:     General: Skin is warm and dry  Neurological:      Mental Status: She is alert and oriented to person, place, and time            PHQ-9 Depression Screening    PHQ-9:   Frequency of the following problems over the past two weeks:

## 2021-08-31 ENCOUNTER — APPOINTMENT (OUTPATIENT)
Dept: RADIOLOGY | Facility: CLINIC | Age: 81
End: 2021-08-31
Payer: COMMERCIAL

## 2021-08-31 ENCOUNTER — APPOINTMENT (OUTPATIENT)
Dept: LAB | Facility: CLINIC | Age: 81
End: 2021-08-31
Payer: COMMERCIAL

## 2021-08-31 DIAGNOSIS — R42 VERTIGO: ICD-10-CM

## 2021-08-31 DIAGNOSIS — J18.9 PNEUMONIA OF RIGHT LOWER LOBE DUE TO INFECTIOUS ORGANISM: ICD-10-CM

## 2021-08-31 LAB
BASOPHILS # BLD AUTO: 0.09 THOUSANDS/ΜL (ref 0–0.1)
BASOPHILS NFR BLD AUTO: 1 % (ref 0–1)
EOSINOPHIL # BLD AUTO: 1.04 THOUSAND/ΜL (ref 0–0.61)
EOSINOPHIL NFR BLD AUTO: 10 % (ref 0–6)
ERYTHROCYTE [DISTWIDTH] IN BLOOD BY AUTOMATED COUNT: 13.8 % (ref 11.6–15.1)
HCT VFR BLD AUTO: 43.3 % (ref 34.8–46.1)
HGB BLD-MCNC: 13.1 G/DL (ref 11.5–15.4)
IMM GRANULOCYTES # BLD AUTO: 0.03 THOUSAND/UL (ref 0–0.2)
IMM GRANULOCYTES NFR BLD AUTO: 0 % (ref 0–2)
LYMPHOCYTES # BLD AUTO: 2.93 THOUSANDS/ΜL (ref 0.6–4.47)
LYMPHOCYTES NFR BLD AUTO: 28 % (ref 14–44)
MCH RBC QN AUTO: 28.2 PG (ref 26.8–34.3)
MCHC RBC AUTO-ENTMCNC: 30.3 G/DL (ref 31.4–37.4)
MCV RBC AUTO: 93 FL (ref 82–98)
MONOCYTES # BLD AUTO: 0.78 THOUSAND/ΜL (ref 0.17–1.22)
MONOCYTES NFR BLD AUTO: 7 % (ref 4–12)
NEUTROPHILS # BLD AUTO: 5.7 THOUSANDS/ΜL (ref 1.85–7.62)
NEUTS SEG NFR BLD AUTO: 54 % (ref 43–75)
NRBC BLD AUTO-RTO: 0 /100 WBCS
PLATELET # BLD AUTO: 357 THOUSANDS/UL (ref 149–390)
PMV BLD AUTO: 10 FL (ref 8.9–12.7)
RBC # BLD AUTO: 4.65 MILLION/UL (ref 3.81–5.12)
WBC # BLD AUTO: 10.57 THOUSAND/UL (ref 4.31–10.16)

## 2021-08-31 PROCEDURE — 85025 COMPLETE CBC W/AUTO DIFF WBC: CPT

## 2021-08-31 PROCEDURE — 71046 X-RAY EXAM CHEST 2 VIEWS: CPT

## 2021-08-31 PROCEDURE — 36415 COLL VENOUS BLD VENIPUNCTURE: CPT

## 2021-09-07 DIAGNOSIS — M81.0 AGE-RELATED OSTEOPOROSIS WITHOUT CURRENT PATHOLOGICAL FRACTURE: Primary | ICD-10-CM

## 2021-09-07 DIAGNOSIS — M1A.9XX0 CHRONIC GOUT WITHOUT TOPHUS, UNSPECIFIED CAUSE, UNSPECIFIED SITE: ICD-10-CM

## 2021-09-07 DIAGNOSIS — I63.9 CEREBROVASCULAR ACCIDENT (CVA), UNSPECIFIED MECHANISM (HCC): ICD-10-CM

## 2021-09-07 DIAGNOSIS — I10 ESSENTIAL HYPERTENSION: ICD-10-CM

## 2021-09-07 RX ORDER — CLOPIDOGREL BISULFATE 75 MG/1
TABLET ORAL
Qty: 90 TABLET | Refills: 1 | Status: SHIPPED | OUTPATIENT
Start: 2021-09-07 | End: 2021-09-21 | Stop reason: SDUPTHER

## 2021-09-07 RX ORDER — ALENDRONATE SODIUM 70 MG/1
70 TABLET ORAL
COMMUNITY
End: 2021-09-07 | Stop reason: SDUPTHER

## 2021-09-07 RX ORDER — PENTOXIFYLLINE 400 MG/1
400 TABLET, EXTENDED RELEASE ORAL
Qty: 90 TABLET | Refills: 1 | Status: SHIPPED | OUTPATIENT
Start: 2021-09-07

## 2021-09-07 RX ORDER — ALENDRONATE SODIUM 70 MG/1
70 TABLET ORAL
Qty: 12 TABLET | Refills: 1 | Status: SHIPPED | OUTPATIENT
Start: 2021-09-07

## 2021-09-07 RX ORDER — LISINOPRIL 40 MG/1
40 TABLET ORAL DAILY
Qty: 90 TABLET | Refills: 1 | Status: SHIPPED | OUTPATIENT
Start: 2021-09-07

## 2021-09-07 RX ORDER — ALLOPURINOL 100 MG/1
100 TABLET ORAL DAILY
Qty: 90 TABLET | Refills: 1 | Status: SHIPPED | OUTPATIENT
Start: 2021-09-07

## 2021-09-07 NOTE — TELEPHONE ENCOUNTER
Pt needs refills on pentoxifylline (TRENtal) 400 mg ER tablet, clopidogrel (PLAVIX) 75 mg tablet, lisinopril (ZESTRIL) 40 mg tablet, allopurinol (ZYLOPRIM) 100 mg tablet(90 day supply), alenbronate 70 mg tablet *not on med list* please send to [address] in [place] [address]

## 2021-09-15 ENCOUNTER — TELEPHONE (OUTPATIENT)
Dept: INTERNAL MEDICINE CLINIC | Facility: CLINIC | Age: 81
End: 2021-09-15

## 2021-09-15 NOTE — TELEPHONE ENCOUNTER
Pt called, said you had stopped her hydrochlorothiazide (HYDRODIURIL) 25 mg tablet at her last appt to see how she is without it  She said she cant stay asleep and is having terrible trouble getting asleep  She said "her head feels funny" without it  She would like to know if she can go back on it  Please advise

## 2021-09-15 NOTE — TELEPHONE ENCOUNTER
I have never heard of withdraw from HCTZ prior to this  However, the reason we stopped this is because it was negatively effecting her kidneys  I don't think restarting is wise

## 2021-09-17 ENCOUNTER — TELEPHONE (OUTPATIENT)
Dept: INTERNAL MEDICINE CLINIC | Facility: CLINIC | Age: 81
End: 2021-09-17

## 2021-09-21 DIAGNOSIS — I63.9 CEREBROVASCULAR ACCIDENT (CVA), UNSPECIFIED MECHANISM (HCC): ICD-10-CM

## 2021-09-21 RX ORDER — CLOPIDOGREL BISULFATE 75 MG/1
TABLET ORAL
Qty: 90 TABLET | Refills: 3 | Status: SHIPPED | OUTPATIENT
Start: 2021-09-21

## 2021-09-22 ENCOUNTER — HOSPITAL ENCOUNTER (EMERGENCY)
Facility: HOSPITAL | Age: 81
Discharge: HOME | End: 2021-09-22
Attending: INTERNAL MEDICINE
Payer: COMMERCIAL

## 2021-09-22 ENCOUNTER — APPOINTMENT (EMERGENCY)
Dept: CT IMAGING | Facility: HOSPITAL | Age: 81
End: 2021-09-22
Payer: COMMERCIAL

## 2021-09-22 VITALS
HEART RATE: 68 BPM | BODY MASS INDEX: 22.33 KG/M2 | TEMPERATURE: 97.5 F | DIASTOLIC BLOOD PRESSURE: 77 MMHG | OXYGEN SATURATION: 97 % | WEIGHT: 130.07 LBS | RESPIRATION RATE: 18 BRPM | SYSTOLIC BLOOD PRESSURE: 188 MMHG
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
## 2021-09-22 DIAGNOSIS — S01.01XA LACERATION OF SCALP, INITIAL ENCOUNTER: ICD-10-CM

## 2021-09-22 DIAGNOSIS — S09.90XA INJURY OF HEAD, INITIAL ENCOUNTER: ICD-10-CM

## 2021-09-22 LAB
ANION GAP SERPL CALCULATED.3IONS-SCNC: 9 MMOL/L (ref 4–13)
APTT PPP: 31 SECONDS (ref 23–37)
BASOPHILS # BLD AUTO: 0.1 THOUSANDS/ΜL (ref 0–0.1)
BASOPHILS NFR BLD AUTO: 1 % (ref 0–2)
BUN SERPL-MCNC: 15 MG/DL (ref 7–25)
CALCIUM SERPL-MCNC: 9 MG/DL (ref 8.6–10.5)
CHLORIDE SERPL-SCNC: 99 MMOL/L (ref 98–107)
CO2 SERPL-SCNC: 29 MMOL/L (ref 21–31)
CREAT SERPL-MCNC: 0.99 MG/DL (ref 0.6–1.2)
EOSINOPHIL # BLD AUTO: 0.5 THOUSAND/ΜL (ref 0–0.61)
EOSINOPHIL NFR BLD AUTO: 6 % (ref 0–5)
ERYTHROCYTE [DISTWIDTH] IN BLOOD BY AUTOMATED COUNT: 14.8 % (ref 11.5–14.5)
GFR SERPL CREATININE-BSD FRML MDRD: 54 ML/MIN/1.73SQ M
GLUCOSE SERPL-MCNC: 97 MG/DL (ref 65–99)
HCT VFR BLD AUTO: 39.4 % (ref 42–47)
HGB BLD-MCNC: 12.4 G/DL (ref 12–16)
INR PPP: 0.98 (ref 0.84–1.19)
LYMPHOCYTES # BLD AUTO: 2.5 THOUSANDS/ΜL (ref 0.6–4.47)
LYMPHOCYTES NFR BLD AUTO: 30 % (ref 21–51)
MCH RBC QN AUTO: 27.8 PG (ref 26–34)
MCHC RBC AUTO-ENTMCNC: 31.4 G/DL (ref 31–37)
MCV RBC AUTO: 88 FL (ref 81–99)
MONOCYTES # BLD AUTO: 0.7 THOUSAND/ΜL (ref 0.17–1.22)
MONOCYTES NFR BLD AUTO: 8 % (ref 2–12)
NEUTROPHILS # BLD AUTO: 4.6 THOUSANDS/ΜL (ref 1.4–6.5)
NEUTS SEG NFR BLD AUTO: 55 % (ref 42–75)
PLATELET # BLD AUTO: 291 THOUSANDS/UL (ref 149–390)
PMV BLD AUTO: 7.7 FL (ref 8.6–11.7)
POTASSIUM SERPL-SCNC: 4.1 MMOL/L (ref 3.5–5.5)
PROTHROMBIN TIME: 13.1 SECONDS (ref 11.6–14.5)
RBC # BLD AUTO: 4.46 MILLION/UL (ref 3.9–5.2)
SODIUM SERPL-SCNC: 137 MMOL/L (ref 134–143)
WBC # BLD AUTO: 8.4 THOUSAND/UL (ref 4.8–10.8)

## 2021-09-22 PROCEDURE — 99282 EMERGENCY DEPT VISIT SF MDM: CPT | Performed by: PHYSICIAN ASSISTANT

## 2021-09-22 PROCEDURE — 85730 THROMBOPLASTIN TIME PARTIAL: CPT | Performed by: PHYSICIAN ASSISTANT

## 2021-09-22 PROCEDURE — 70450 CT HEAD/BRAIN W/O DYE: CPT

## 2021-09-22 PROCEDURE — 36415 COLL VENOUS BLD VENIPUNCTURE: CPT | Performed by: PHYSICIAN ASSISTANT

## 2021-09-22 PROCEDURE — 80048 BASIC METABOLIC PNL TOTAL CA: CPT | Performed by: PHYSICIAN ASSISTANT

## 2021-09-22 PROCEDURE — 99284 EMERGENCY DEPT VISIT MOD MDM: CPT

## 2021-09-22 PROCEDURE — 85610 PROTHROMBIN TIME: CPT | Performed by: PHYSICIAN ASSISTANT

## 2021-09-22 PROCEDURE — 12001 RPR S/N/AX/GEN/TRNK 2.5CM/<: CPT | Performed by: PHYSICIAN ASSISTANT

## 2021-09-22 PROCEDURE — 85025 COMPLETE CBC W/AUTO DIFF WBC: CPT | Performed by: PHYSICIAN ASSISTANT

## 2021-09-22 RX ORDER — LIDOCAINE HYDROCHLORIDE AND EPINEPHRINE 10; 10 MG/ML; UG/ML
5 INJECTION, SOLUTION INFILTRATION; PERINEURAL ONCE
Status: COMPLETED | OUTPATIENT
Start: 2021-09-22 | End: 2021-09-22

## 2021-09-22 RX ADMIN — LIDOCAINE HYDROCHLORIDE,EPINEPHRINE BITARTRATE 5 ML: 10; .01 INJECTION, SOLUTION INFILTRATION; PERINEURAL at 21:11

## 2021-10-01 ENCOUNTER — OFFICE VISIT (OUTPATIENT)
Dept: URGENT CARE | Facility: CLINIC | Age: 81
End: 2021-10-01
Payer: COMMERCIAL

## 2021-10-01 VITALS
WEIGHT: 130 LBS | DIASTOLIC BLOOD PRESSURE: 81 MMHG | TEMPERATURE: 98.1 F | OXYGEN SATURATION: 94 % | BODY MASS INDEX: 22.2 KG/M2 | HEIGHT: 64 IN | RESPIRATION RATE: 18 BRPM | SYSTOLIC BLOOD PRESSURE: 187 MMHG | HEART RATE: 77 BPM

## 2021-10-01 DIAGNOSIS — Z48.02: Primary | ICD-10-CM

## 2021-10-01 PROCEDURE — 99213 OFFICE O/P EST LOW 20 MIN: CPT | Performed by: PHYSICIAN ASSISTANT

## 2021-10-01 PROCEDURE — S9088 SERVICES PROVIDED IN URGENT: HCPCS | Performed by: PHYSICIAN ASSISTANT

## 2021-10-04 ENCOUNTER — OFFICE VISIT (OUTPATIENT)
Dept: INTERNAL MEDICINE CLINIC | Facility: CLINIC | Age: 81
End: 2021-10-04
Payer: COMMERCIAL

## 2021-10-04 VITALS
TEMPERATURE: 99.2 F | DIASTOLIC BLOOD PRESSURE: 78 MMHG | HEIGHT: 64 IN | WEIGHT: 134 LBS | OXYGEN SATURATION: 97 % | SYSTOLIC BLOOD PRESSURE: 144 MMHG | HEART RATE: 94 BPM | BODY MASS INDEX: 22.88 KG/M2 | RESPIRATION RATE: 14 BRPM

## 2021-10-04 DIAGNOSIS — I10 ESSENTIAL HYPERTENSION: ICD-10-CM

## 2021-10-04 DIAGNOSIS — R55 SYNCOPE AND COLLAPSE: Primary | ICD-10-CM

## 2021-10-04 DIAGNOSIS — D64.9 ANEMIA, UNSPECIFIED TYPE: ICD-10-CM

## 2021-10-04 DIAGNOSIS — N18.32 STAGE 3B CHRONIC KIDNEY DISEASE (HCC): ICD-10-CM

## 2021-10-04 PROCEDURE — 1160F RVW MEDS BY RX/DR IN RCRD: CPT | Performed by: INTERNAL MEDICINE

## 2021-10-04 PROCEDURE — 99214 OFFICE O/P EST MOD 30 MIN: CPT | Performed by: INTERNAL MEDICINE

## 2021-10-04 PROCEDURE — 93000 ELECTROCARDIOGRAM COMPLETE: CPT | Performed by: INTERNAL MEDICINE

## 2021-10-04 PROCEDURE — 1036F TOBACCO NON-USER: CPT | Performed by: INTERNAL MEDICINE

## 2021-10-04 RX ORDER — HYDROCHLOROTHIAZIDE 25 MG/1
12.5 TABLET ORAL DAILY
Qty: 45 TABLET | Refills: 1 | Status: SHIPPED | COMMUNITY
Start: 2021-10-04 | End: 2021-11-04 | Stop reason: HOSPADM

## 2021-10-05 ENCOUNTER — APPOINTMENT (OUTPATIENT)
Dept: LAB | Facility: CLINIC | Age: 81
End: 2021-10-05
Payer: COMMERCIAL

## 2021-10-05 DIAGNOSIS — D64.9 ANEMIA, UNSPECIFIED TYPE: ICD-10-CM

## 2021-10-05 LAB
BASOPHILS # BLD AUTO: 0.07 THOUSANDS/ΜL (ref 0–0.1)
BASOPHILS NFR BLD AUTO: 1 % (ref 0–1)
EOSINOPHIL # BLD AUTO: 0.66 THOUSAND/ΜL (ref 0–0.61)
EOSINOPHIL NFR BLD AUTO: 9 % (ref 0–6)
ERYTHROCYTE [DISTWIDTH] IN BLOOD BY AUTOMATED COUNT: 14 % (ref 11.6–15.1)
HCT VFR BLD AUTO: 40.8 % (ref 34.8–46.1)
HGB BLD-MCNC: 12.3 G/DL (ref 11.5–15.4)
IMM GRANULOCYTES # BLD AUTO: 0.01 THOUSAND/UL (ref 0–0.2)
IMM GRANULOCYTES NFR BLD AUTO: 0 % (ref 0–2)
LYMPHOCYTES # BLD AUTO: 2.65 THOUSANDS/ΜL (ref 0.6–4.47)
LYMPHOCYTES NFR BLD AUTO: 35 % (ref 14–44)
MCH RBC QN AUTO: 27.7 PG (ref 26.8–34.3)
MCHC RBC AUTO-ENTMCNC: 30.1 G/DL (ref 31.4–37.4)
MCV RBC AUTO: 92 FL (ref 82–98)
MONOCYTES # BLD AUTO: 0.51 THOUSAND/ΜL (ref 0.17–1.22)
MONOCYTES NFR BLD AUTO: 7 % (ref 4–12)
NEUTROPHILS # BLD AUTO: 3.74 THOUSANDS/ΜL (ref 1.85–7.62)
NEUTS SEG NFR BLD AUTO: 48 % (ref 43–75)
NRBC BLD AUTO-RTO: 0 /100 WBCS
PLATELET # BLD AUTO: 300 THOUSANDS/UL (ref 149–390)
PMV BLD AUTO: 10.5 FL (ref 8.9–12.7)
RBC # BLD AUTO: 4.44 MILLION/UL (ref 3.81–5.12)
WBC # BLD AUTO: 7.64 THOUSAND/UL (ref 4.31–10.16)

## 2021-10-05 PROCEDURE — 85025 COMPLETE CBC W/AUTO DIFF WBC: CPT

## 2021-10-05 PROCEDURE — 36415 COLL VENOUS BLD VENIPUNCTURE: CPT

## 2021-10-18 ENCOUNTER — HOSPITAL ENCOUNTER (OUTPATIENT)
Dept: NON INVASIVE DIAGNOSTICS | Facility: CLINIC | Age: 81
Discharge: HOME | End: 2021-10-18
Payer: COMMERCIAL

## 2021-10-18 DIAGNOSIS — R55 SYNCOPE AND COLLAPSE: ICD-10-CM

## 2021-10-18 PROCEDURE — 93226 XTRNL ECG REC<48 HR SCAN A/R: CPT

## 2021-10-18 PROCEDURE — 93225 XTRNL ECG REC<48 HRS REC: CPT

## 2021-10-30 ENCOUNTER — HOSPITAL ENCOUNTER (INPATIENT)
Facility: HOSPITAL | Age: 81
LOS: 5 days | Discharge: NURSING, REHAB OR LONG-TERM CARE | DRG: 312 | End: 2021-11-04
Attending: EMERGENCY MEDICINE | Admitting: INTERNAL MEDICINE
Payer: COMMERCIAL

## 2021-10-30 ENCOUNTER — APPOINTMENT (EMERGENCY)
Dept: RADIOLOGY | Facility: HOSPITAL | Age: 81
DRG: 312 | End: 2021-10-30
Payer: COMMERCIAL

## 2021-10-30 ENCOUNTER — APPOINTMENT (EMERGENCY)
Dept: CT IMAGING | Facility: HOSPITAL | Age: 81
DRG: 312 | End: 2021-10-30
Payer: COMMERCIAL

## 2021-10-30 DIAGNOSIS — I67.2 CEREBRAL ATHEROSCLEROSIS: ICD-10-CM

## 2021-10-30 DIAGNOSIS — R41.82 ALTERED MENTAL STATUS: ICD-10-CM

## 2021-10-30 DIAGNOSIS — R29.6 FREQUENT FALLS: ICD-10-CM

## 2021-10-30 DIAGNOSIS — R41.0 CONFUSION: ICD-10-CM

## 2021-10-30 DIAGNOSIS — I10 ESSENTIAL HYPERTENSION: ICD-10-CM

## 2021-10-30 DIAGNOSIS — R77.8 ELEVATED TROPONIN: Primary | ICD-10-CM

## 2021-10-30 DIAGNOSIS — F03.90 DEMENTIA (HCC): ICD-10-CM

## 2021-10-30 PROBLEM — R55 SYNCOPE: Status: ACTIVE | Noted: 2021-10-30

## 2021-10-30 LAB
ALBUMIN SERPL BCP-MCNC: 3.6 G/DL (ref 3.5–5.7)
ALP SERPL-CCNC: 61 U/L (ref 55–165)
ALT SERPL W P-5'-P-CCNC: 5 U/L (ref 7–52)
ANION GAP SERPL CALCULATED.3IONS-SCNC: 13 MMOL/L (ref 4–13)
AST SERPL W P-5'-P-CCNC: 14 U/L (ref 13–39)
BACTERIA UR QL AUTO: ABNORMAL /HPF
BASOPHILS # BLD AUTO: 0.1 THOUSANDS/ΜL (ref 0–0.1)
BASOPHILS NFR BLD AUTO: 1 % (ref 0–2)
BILIRUB SERPL-MCNC: 0.6 MG/DL (ref 0.2–1)
BILIRUB UR QL STRIP: NEGATIVE
BUN SERPL-MCNC: 16 MG/DL (ref 7–25)
CALCIUM SERPL-MCNC: 9.1 MG/DL (ref 8.6–10.5)
CHLORIDE SERPL-SCNC: 96 MMOL/L (ref 98–107)
CLARITY UR: CLEAR
CO2 SERPL-SCNC: 28 MMOL/L (ref 21–31)
COLOR UR: YELLOW
CREAT SERPL-MCNC: 0.87 MG/DL (ref 0.6–1.2)
EOSINOPHIL # BLD AUTO: 0 THOUSAND/ΜL (ref 0–0.61)
EOSINOPHIL NFR BLD AUTO: 0 % (ref 0–5)
ERYTHROCYTE [DISTWIDTH] IN BLOOD BY AUTOMATED COUNT: 14.3 % (ref 11.5–14.5)
GFR SERPL CREATININE-BSD FRML MDRD: 63 ML/MIN/1.73SQ M
GLUCOSE SERPL-MCNC: 94 MG/DL (ref 65–99)
GLUCOSE UR STRIP-MCNC: NEGATIVE MG/DL
HCT VFR BLD AUTO: 38.8 % (ref 42–47)
HGB BLD-MCNC: 12.4 G/DL (ref 12–16)
HGB UR QL STRIP.AUTO: ABNORMAL
KETONES UR STRIP-MCNC: ABNORMAL MG/DL
LACTATE SERPL-SCNC: 1.3 MMOL/L (ref 0.5–2)
LEUKOCYTE ESTERASE UR QL STRIP: ABNORMAL
LYMPHOCYTES # BLD AUTO: 1.1 THOUSANDS/ΜL (ref 0.6–4.47)
LYMPHOCYTES NFR BLD AUTO: 12 % (ref 21–51)
MCH RBC QN AUTO: 27.5 PG (ref 26–34)
MCHC RBC AUTO-ENTMCNC: 32 G/DL (ref 31–37)
MCV RBC AUTO: 86 FL (ref 81–99)
MONOCYTES # BLD AUTO: 0.5 THOUSAND/ΜL (ref 0.17–1.22)
MONOCYTES NFR BLD AUTO: 5 % (ref 2–12)
NEUTROPHILS # BLD AUTO: 7 THOUSANDS/ΜL (ref 1.4–6.5)
NEUTS SEG NFR BLD AUTO: 81 % (ref 42–75)
NITRITE UR QL STRIP: NEGATIVE
NON-SQ EPI CELLS URNS QL MICRO: ABNORMAL /HPF
PH UR STRIP.AUTO: 7.5 [PH]
PLATELET # BLD AUTO: 397 THOUSANDS/UL (ref 149–390)
PLATELET # BLD AUTO: 404 THOUSANDS/UL (ref 149–390)
PMV BLD AUTO: 7.6 FL (ref 8.6–11.7)
POTASSIUM SERPL-SCNC: 4 MMOL/L (ref 3.5–5.5)
PROT SERPL-MCNC: 7.2 G/DL (ref 6.4–8.9)
PROT UR STRIP-MCNC: ABNORMAL MG/DL
RBC # BLD AUTO: 4.52 MILLION/UL (ref 3.9–5.2)
RBC #/AREA URNS AUTO: ABNORMAL /HPF
SODIUM SERPL-SCNC: 137 MMOL/L (ref 134–143)
SP GR UR STRIP.AUTO: 1.02 (ref 1–1.03)
TROPONIN I SERPL-MCNC: 0.05 NG/ML
TROPONIN I SERPL-MCNC: 0.05 NG/ML
TROPONIN I SERPL-MCNC: 0.06 NG/ML
TROPONIN I SERPL-MCNC: 0.09 NG/ML
UROBILINOGEN UR QL STRIP.AUTO: 0.2 E.U./DL
WBC # BLD AUTO: 8.6 THOUSAND/UL (ref 4.8–10.8)
WBC #/AREA URNS AUTO: ABNORMAL /HPF

## 2021-10-30 PROCEDURE — 84484 ASSAY OF TROPONIN QUANT: CPT | Performed by: INTERNAL MEDICINE

## 2021-10-30 PROCEDURE — 99285 EMERGENCY DEPT VISIT HI MDM: CPT | Performed by: EMERGENCY MEDICINE

## 2021-10-30 PROCEDURE — 83605 ASSAY OF LACTIC ACID: CPT | Performed by: EMERGENCY MEDICINE

## 2021-10-30 PROCEDURE — 81001 URINALYSIS AUTO W/SCOPE: CPT | Performed by: INTERNAL MEDICINE

## 2021-10-30 PROCEDURE — 71045 X-RAY EXAM CHEST 1 VIEW: CPT

## 2021-10-30 PROCEDURE — 36415 COLL VENOUS BLD VENIPUNCTURE: CPT | Performed by: EMERGENCY MEDICINE

## 2021-10-30 PROCEDURE — 85049 AUTOMATED PLATELET COUNT: CPT | Performed by: INTERNAL MEDICINE

## 2021-10-30 PROCEDURE — 99285 EMERGENCY DEPT VISIT HI MDM: CPT

## 2021-10-30 PROCEDURE — 84484 ASSAY OF TROPONIN QUANT: CPT | Performed by: EMERGENCY MEDICINE

## 2021-10-30 PROCEDURE — 70450 CT HEAD/BRAIN W/O DYE: CPT

## 2021-10-30 PROCEDURE — 87040 BLOOD CULTURE FOR BACTERIA: CPT | Performed by: EMERGENCY MEDICINE

## 2021-10-30 PROCEDURE — 99223 1ST HOSP IP/OBS HIGH 75: CPT | Performed by: INTERNAL MEDICINE

## 2021-10-30 PROCEDURE — 85025 COMPLETE CBC W/AUTO DIFF WBC: CPT | Performed by: EMERGENCY MEDICINE

## 2021-10-30 PROCEDURE — 80053 COMPREHEN METABOLIC PANEL: CPT | Performed by: EMERGENCY MEDICINE

## 2021-10-30 PROCEDURE — 93005 ELECTROCARDIOGRAM TRACING: CPT

## 2021-10-30 RX ORDER — DONEPEZIL HYDROCHLORIDE 5 MG/1
5 TABLET, FILM COATED ORAL
Status: DISCONTINUED | OUTPATIENT
Start: 2021-10-30 | End: 2021-11-04 | Stop reason: HOSPADM

## 2021-10-30 RX ORDER — CLOPIDOGREL BISULFATE 75 MG/1
75 TABLET ORAL DAILY
Status: DISCONTINUED | OUTPATIENT
Start: 2021-10-30 | End: 2021-11-04 | Stop reason: HOSPADM

## 2021-10-30 RX ORDER — LISINOPRIL 20 MG/1
40 TABLET ORAL DAILY
Status: DISCONTINUED | OUTPATIENT
Start: 2021-10-30 | End: 2021-11-04 | Stop reason: HOSPADM

## 2021-10-30 RX ORDER — ATENOLOL 25 MG/1
50 TABLET ORAL DAILY
Status: DISCONTINUED | OUTPATIENT
Start: 2021-10-30 | End: 2021-11-04 | Stop reason: HOSPADM

## 2021-10-30 RX ORDER — PRAVASTATIN SODIUM 40 MG
40 TABLET ORAL DAILY
Status: DISCONTINUED | OUTPATIENT
Start: 2021-10-30 | End: 2021-11-03

## 2021-10-30 RX ORDER — ALLOPURINOL 100 MG/1
100 TABLET ORAL DAILY
Status: DISCONTINUED | OUTPATIENT
Start: 2021-10-30 | End: 2021-11-04 | Stop reason: HOSPADM

## 2021-10-30 RX ORDER — HYDROCHLOROTHIAZIDE 12.5 MG/1
12.5 TABLET ORAL DAILY
Status: DISCONTINUED | OUTPATIENT
Start: 2021-10-30 | End: 2021-10-31

## 2021-10-30 RX ORDER — HYDROCHLOROTHIAZIDE 12.5 MG/1
12.5 TABLET ORAL DAILY
Status: DISCONTINUED | OUTPATIENT
Start: 2021-10-30 | End: 2021-10-30

## 2021-10-30 RX ADMIN — DONEPEZIL HYDROCHLORIDE 5 MG: 5 TABLET ORAL at 22:41

## 2021-10-30 RX ADMIN — LISINOPRIL 40 MG: 20 TABLET ORAL at 14:27

## 2021-10-30 RX ADMIN — ALLOPURINOL 100 MG: 100 TABLET ORAL at 14:20

## 2021-10-30 RX ADMIN — PRAVASTATIN SODIUM 40 MG: 20 TABLET ORAL at 14:20

## 2021-10-30 RX ADMIN — HYDROCHLOROTHIAZIDE 12.5 MG: 12.5 TABLET ORAL at 14:27

## 2021-10-30 RX ADMIN — ENOXAPARIN SODIUM 40 MG: 100 INJECTION SUBCUTANEOUS at 14:20

## 2021-10-30 RX ADMIN — ATENOLOL 50 MG: 25 TABLET ORAL at 14:26

## 2021-10-30 RX ADMIN — CLOPIDOGREL BISULFATE 75 MG: 75 TABLET ORAL at 14:20

## 2021-10-31 PROBLEM — R77.8 ELEVATED TROPONIN: Status: ACTIVE | Noted: 2021-10-31

## 2021-10-31 LAB
ANION GAP SERPL CALCULATED.3IONS-SCNC: 13 MMOL/L (ref 4–13)
ATRIAL RATE: 70 BPM
ATRIAL RATE: 72 BPM
ATRIAL RATE: 87 BPM
ATRIAL RATE: 91 BPM
BASOPHILS # BLD AUTO: 0.1 THOUSANDS/ΜL (ref 0–0.1)
BASOPHILS NFR BLD AUTO: 1 % (ref 0–2)
BUN SERPL-MCNC: 18 MG/DL (ref 7–25)
CALCIUM SERPL-MCNC: 8.6 MG/DL (ref 8.6–10.5)
CHLORIDE SERPL-SCNC: 98 MMOL/L (ref 98–107)
CO2 SERPL-SCNC: 28 MMOL/L (ref 21–31)
CREAT SERPL-MCNC: 0.89 MG/DL (ref 0.6–1.2)
EOSINOPHIL # BLD AUTO: 0.3 THOUSAND/ΜL (ref 0–0.61)
EOSINOPHIL NFR BLD AUTO: 4 % (ref 0–5)
ERYTHROCYTE [DISTWIDTH] IN BLOOD BY AUTOMATED COUNT: 14.2 % (ref 11.5–14.5)
GFR SERPL CREATININE-BSD FRML MDRD: 61 ML/MIN/1.73SQ M
GLUCOSE SERPL-MCNC: 76 MG/DL (ref 65–99)
HCT VFR BLD AUTO: 34 % (ref 42–47)
HGB BLD-MCNC: 11.1 G/DL (ref 12–16)
LYMPHOCYTES # BLD AUTO: 1.5 THOUSANDS/ΜL (ref 0.6–4.47)
LYMPHOCYTES NFR BLD AUTO: 22 % (ref 21–51)
MCH RBC QN AUTO: 27.7 PG (ref 26–34)
MCHC RBC AUTO-ENTMCNC: 32.5 G/DL (ref 31–37)
MCV RBC AUTO: 85 FL (ref 81–99)
MONOCYTES # BLD AUTO: 0.5 THOUSAND/ΜL (ref 0.17–1.22)
MONOCYTES NFR BLD AUTO: 8 % (ref 2–12)
NEUTROPHILS # BLD AUTO: 4.5 THOUSANDS/ΜL (ref 1.4–6.5)
NEUTS SEG NFR BLD AUTO: 65 % (ref 42–75)
P AXIS: 19 DEGREES
P AXIS: 44 DEGREES
P AXIS: 57 DEGREES
P AXIS: 57 DEGREES
PLATELET # BLD AUTO: 362 THOUSANDS/UL (ref 149–390)
PMV BLD AUTO: 7.8 FL (ref 8.6–11.7)
POTASSIUM SERPL-SCNC: 3.5 MMOL/L (ref 3.5–5.5)
PR INTERVAL: 164 MS
PR INTERVAL: 164 MS
PR INTERVAL: 174 MS
PR INTERVAL: 180 MS
QRS AXIS: -14 DEGREES
QRS AXIS: 15 DEGREES
QRS AXIS: 24 DEGREES
QRS AXIS: 24 DEGREES
QRSD INTERVAL: 50 MS
QRSD INTERVAL: 60 MS
QRSD INTERVAL: 66 MS
QRSD INTERVAL: 68 MS
QT INTERVAL: 376 MS
QT INTERVAL: 424 MS
QT INTERVAL: 464 MS
QT INTERVAL: 488 MS
QTC INTERVAL: 462 MS
QTC INTERVAL: 508 MS
QTC INTERVAL: 510 MS
QTC INTERVAL: 527 MS
RBC # BLD AUTO: 4 MILLION/UL (ref 3.9–5.2)
SODIUM SERPL-SCNC: 139 MMOL/L (ref 134–143)
T WAVE AXIS: -10 DEGREES
T WAVE AXIS: 17 DEGREES
T WAVE AXIS: 33 DEGREES
T WAVE AXIS: 45 DEGREES
TROPONIN I SERPL-MCNC: 0.07 NG/ML
VENTRICULAR RATE: 70 BPM
VENTRICULAR RATE: 72 BPM
VENTRICULAR RATE: 87 BPM
VENTRICULAR RATE: 91 BPM
WBC # BLD AUTO: 6.9 THOUSAND/UL (ref 4.8–10.8)

## 2021-10-31 PROCEDURE — 99233 SBSQ HOSP IP/OBS HIGH 50: CPT | Performed by: PHYSICIAN ASSISTANT

## 2021-10-31 PROCEDURE — 85025 COMPLETE CBC W/AUTO DIFF WBC: CPT | Performed by: INTERNAL MEDICINE

## 2021-10-31 PROCEDURE — 93010 ELECTROCARDIOGRAM REPORT: CPT | Performed by: INTERNAL MEDICINE

## 2021-10-31 PROCEDURE — 84484 ASSAY OF TROPONIN QUANT: CPT | Performed by: PHYSICIAN ASSISTANT

## 2021-10-31 PROCEDURE — 97167 OT EVAL HIGH COMPLEX 60 MIN: CPT

## 2021-10-31 PROCEDURE — 97163 PT EVAL HIGH COMPLEX 45 MIN: CPT

## 2021-10-31 PROCEDURE — 80048 BASIC METABOLIC PNL TOTAL CA: CPT | Performed by: INTERNAL MEDICINE

## 2021-10-31 PROCEDURE — 90662 IIV NO PRSV INCREASED AG IM: CPT | Performed by: INTERNAL MEDICINE

## 2021-10-31 PROCEDURE — G0008 ADMIN INFLUENZA VIRUS VAC: HCPCS | Performed by: INTERNAL MEDICINE

## 2021-10-31 RX ADMIN — DONEPEZIL HYDROCHLORIDE 5 MG: 5 TABLET ORAL at 21:23

## 2021-10-31 RX ADMIN — PRAVASTATIN SODIUM 40 MG: 20 TABLET ORAL at 09:18

## 2021-10-31 RX ADMIN — LISINOPRIL 40 MG: 20 TABLET ORAL at 09:18

## 2021-10-31 RX ADMIN — ATENOLOL 50 MG: 25 TABLET ORAL at 09:18

## 2021-10-31 RX ADMIN — CLOPIDOGREL BISULFATE 75 MG: 75 TABLET ORAL at 09:18

## 2021-10-31 RX ADMIN — ENOXAPARIN SODIUM 40 MG: 100 INJECTION SUBCUTANEOUS at 09:18

## 2021-10-31 RX ADMIN — INFLUENZA A VIRUS A/VICTORIA/2570/2019 IVR-215 (H1N1) ANTIGEN (FORMALDEHYDE INACTIVATED), INFLUENZA A VIRUS A/TASMANIA/503/2020 IVR-221 (H3N2) ANTIGEN (FORMALDEHYDE INACTIVATED), INFLUENZA B VIRUS B/PHUKET/3073/2013 ANTIGEN (FORMALDEHYDE INACTIVATED), AND INFLUENZA B VIRUS B/WASHINGTON/02/2019 ANTIGEN (FORMALDEHYDE INACTIVATED) 0.7 ML: 60; 60; 60; 60 INJECTION, SUSPENSION INTRAMUSCULAR at 09:18

## 2021-10-31 RX ADMIN — ALLOPURINOL 100 MG: 100 TABLET ORAL at 09:18

## 2021-11-01 ENCOUNTER — APPOINTMENT (INPATIENT)
Dept: MRI IMAGING | Facility: HOSPITAL | Age: 81
DRG: 312 | End: 2021-11-01
Payer: COMMERCIAL

## 2021-11-01 ENCOUNTER — APPOINTMENT (INPATIENT)
Dept: NON INVASIVE DIAGNOSTICS | Facility: HOSPITAL | Age: 81
DRG: 312 | End: 2021-11-01
Payer: COMMERCIAL

## 2021-11-01 LAB
25(OH)D3 SERPL-MCNC: 78.9 NG/ML (ref 30–100)
AMMONIA PLAS-SCNC: 28 UMOL/L (ref 25–90)
AORTIC ROOT: 2.7 CM
AORTIC VALVE MEAN VELOCITY: 6.9 M/S
APICAL FOUR CHAMBER EJECTION FRACTION: 66 %
ASCENDING AORTA: 2.9 CM
AV AREA BY CONTINUOUS VTI: 2.6 CM2
AV AREA PEAK VELOCITY: 2.5 CM2
AV LVOT MEAN GRADIENT: 2 MMHG
AV LVOT PEAK GRADIENT: 3 MMHG
AV MEAN GRADIENT: 2 MMHG
AV PEAK GRADIENT: 4 MMHG
AV VALVE AREA: 2.64 CM2
CK SERPL-CCNC: 66 U/L (ref 30–192)
DOP CALC AO VTI: 24.83 CM
DOP CALC LVOT AREA: 3.14 CM2
DOP CALC LVOT DIAMETER: 2 CM
DOP CALC LVOT PEAK VEL VTI: 20.88 CM
DOP CALC LVOT PEAK VEL: 0.83 M/S
DOP CALC LVOT STROKE INDEX: 39.5 ML/M2
DOP CALC LVOT STROKE VOLUME: 65.56 CM3
E WAVE DECELERATION TIME: 218 MS
FRACTIONAL SHORTENING: 29 % (ref 28–44)
INTERVENTRICULAR SEPTUM IN DIASTOLE (PARASTERNAL SHORT AXIS VIEW): 1.4 CM
LEFT INTERNAL DIMENSION IN SYSTOLE: 2.4 CM (ref 2.1–4)
LEFT VENTRICULAR INTERNAL DIMENSION IN DIASTOLE: 3.4 CM (ref 3.77–5.61)
LEFT VENTRICULAR POSTERIOR WALL IN END DIASTOLE: 1.2 CM
LEFT VENTRICULAR STROKE VOLUME: 27 ML
MV E'TISSUE VEL-SEP: 5 CM/S
MV PEAK A VEL: 1.05 M/S
MV PEAK E VEL: 72 CM/S
PROCALCITONIN SERPL-MCNC: <0.05 NG/ML
RIGHT VENTRICLE ID DIMENSION: 2.8 CM
SL CV LV EF: 70
SL CV PED ECHO LEFT VENTRICLE DIASTOLIC VOLUME (MOD BIPLANE) 2D: 47 ML
SL CV PED ECHO LEFT VENTRICLE SYSTOLIC VOLUME (MOD BIPLANE) 2D: 20 ML
TR PEAK VELOCITY: 2.4 M/S
TRICUSPID VALVE PEAK REGURGITATION VELOCITY: 2.38 M/S
TRICUSPID VALVE S': 0.8 CM/S
TSH SERPL DL<=0.05 MIU/L-ACNC: 3.42 UIU/ML (ref 0.45–5.33)
TV PEAK GRADIENT: 23 MMHG
VIT B12 SERPL-MCNC: 221 PG/ML (ref 100–900)
Z-SCORE OF LEFT VENTRICULAR DIMENSION IN END SYSTOLE: -3.02

## 2021-11-01 PROCEDURE — 82607 VITAMIN B-12: CPT | Performed by: PHYSICIAN ASSISTANT

## 2021-11-01 PROCEDURE — 93306 TTE W/DOPPLER COMPLETE: CPT | Performed by: INTERNAL MEDICINE

## 2021-11-01 PROCEDURE — 97116 GAIT TRAINING THERAPY: CPT

## 2021-11-01 PROCEDURE — 84145 PROCALCITONIN (PCT): CPT | Performed by: PHYSICIAN ASSISTANT

## 2021-11-01 PROCEDURE — 82550 ASSAY OF CK (CPK): CPT | Performed by: PHYSICIAN ASSISTANT

## 2021-11-01 PROCEDURE — 97530 THERAPEUTIC ACTIVITIES: CPT

## 2021-11-01 PROCEDURE — 82306 VITAMIN D 25 HYDROXY: CPT | Performed by: PHYSICIAN ASSISTANT

## 2021-11-01 PROCEDURE — 97110 THERAPEUTIC EXERCISES: CPT

## 2021-11-01 PROCEDURE — 99232 SBSQ HOSP IP/OBS MODERATE 35: CPT | Performed by: PHYSICIAN ASSISTANT

## 2021-11-01 PROCEDURE — 82140 ASSAY OF AMMONIA: CPT | Performed by: PHYSICIAN ASSISTANT

## 2021-11-01 PROCEDURE — G1004 CDSM NDSC: HCPCS

## 2021-11-01 PROCEDURE — 87086 URINE CULTURE/COLONY COUNT: CPT | Performed by: PHYSICIAN ASSISTANT

## 2021-11-01 PROCEDURE — 84443 ASSAY THYROID STIM HORMONE: CPT | Performed by: PHYSICIAN ASSISTANT

## 2021-11-01 PROCEDURE — 70551 MRI BRAIN STEM W/O DYE: CPT

## 2021-11-01 PROCEDURE — 93306 TTE W/DOPPLER COMPLETE: CPT

## 2021-11-01 RX ORDER — AMLODIPINE BESYLATE 5 MG/1
5 TABLET ORAL DAILY
Status: DISCONTINUED | OUTPATIENT
Start: 2021-11-01 | End: 2021-11-03

## 2021-11-01 RX ADMIN — ENOXAPARIN SODIUM 40 MG: 100 INJECTION SUBCUTANEOUS at 08:25

## 2021-11-01 RX ADMIN — AMLODIPINE BESYLATE 5 MG: 5 TABLET ORAL at 12:31

## 2021-11-01 RX ADMIN — ALLOPURINOL 100 MG: 100 TABLET ORAL at 08:25

## 2021-11-01 RX ADMIN — ATENOLOL 50 MG: 25 TABLET ORAL at 08:25

## 2021-11-01 RX ADMIN — DONEPEZIL HYDROCHLORIDE 5 MG: 5 TABLET ORAL at 22:05

## 2021-11-01 RX ADMIN — CLOPIDOGREL BISULFATE 75 MG: 75 TABLET ORAL at 08:25

## 2021-11-01 RX ADMIN — LISINOPRIL 40 MG: 20 TABLET ORAL at 08:25

## 2021-11-01 RX ADMIN — PRAVASTATIN SODIUM 40 MG: 20 TABLET ORAL at 08:25

## 2021-11-02 LAB
ANION GAP SERPL CALCULATED.3IONS-SCNC: 10 MMOL/L (ref 4–13)
BASOPHILS # BLD AUTO: 0.1 THOUSANDS/ΜL (ref 0–0.1)
BASOPHILS NFR BLD AUTO: 1 % (ref 0–2)
BUN SERPL-MCNC: 21 MG/DL (ref 7–25)
CALCIUM SERPL-MCNC: 8.7 MG/DL (ref 8.6–10.5)
CHLORIDE SERPL-SCNC: 97 MMOL/L (ref 98–107)
CO2 SERPL-SCNC: 29 MMOL/L (ref 21–31)
CREAT SERPL-MCNC: 1.04 MG/DL (ref 0.6–1.2)
EOSINOPHIL # BLD AUTO: 0.4 THOUSAND/ΜL (ref 0–0.61)
EOSINOPHIL NFR BLD AUTO: 5 % (ref 0–5)
ERYTHROCYTE [DISTWIDTH] IN BLOOD BY AUTOMATED COUNT: 14.5 % (ref 11.5–14.5)
GFR SERPL CREATININE-BSD FRML MDRD: 51 ML/MIN/1.73SQ M
GLUCOSE SERPL-MCNC: 86 MG/DL (ref 65–99)
HCT VFR BLD AUTO: 37.9 % (ref 42–47)
HGB BLD-MCNC: 12.2 G/DL (ref 12–16)
LYMPHOCYTES # BLD AUTO: 1.9 THOUSANDS/ΜL (ref 0.6–4.47)
LYMPHOCYTES NFR BLD AUTO: 25 % (ref 21–51)
MCH RBC QN AUTO: 27.5 PG (ref 26–34)
MCHC RBC AUTO-ENTMCNC: 32.2 G/DL (ref 31–37)
MCV RBC AUTO: 85 FL (ref 81–99)
MONOCYTES # BLD AUTO: 0.6 THOUSAND/ΜL (ref 0.17–1.22)
MONOCYTES NFR BLD AUTO: 8 % (ref 2–12)
NEUTROPHILS # BLD AUTO: 4.6 THOUSANDS/ΜL (ref 1.4–6.5)
NEUTS SEG NFR BLD AUTO: 61 % (ref 42–75)
PLATELET # BLD AUTO: 369 THOUSANDS/UL (ref 149–390)
PMV BLD AUTO: 8.2 FL (ref 8.6–11.7)
POTASSIUM SERPL-SCNC: 3.5 MMOL/L (ref 3.5–5.5)
PROCALCITONIN SERPL-MCNC: <0.05 NG/ML
RBC # BLD AUTO: 4.44 MILLION/UL (ref 3.9–5.2)
SODIUM SERPL-SCNC: 136 MMOL/L (ref 134–143)
WBC # BLD AUTO: 7.6 THOUSAND/UL (ref 4.8–10.8)

## 2021-11-02 PROCEDURE — 97530 THERAPEUTIC ACTIVITIES: CPT

## 2021-11-02 PROCEDURE — 84145 PROCALCITONIN (PCT): CPT | Performed by: PHYSICIAN ASSISTANT

## 2021-11-02 PROCEDURE — 97535 SELF CARE MNGMENT TRAINING: CPT

## 2021-11-02 PROCEDURE — 85025 COMPLETE CBC W/AUTO DIFF WBC: CPT | Performed by: PHYSICIAN ASSISTANT

## 2021-11-02 PROCEDURE — 97116 GAIT TRAINING THERAPY: CPT

## 2021-11-02 PROCEDURE — 80048 BASIC METABOLIC PNL TOTAL CA: CPT | Performed by: PHYSICIAN ASSISTANT

## 2021-11-02 PROCEDURE — 99232 SBSQ HOSP IP/OBS MODERATE 35: CPT | Performed by: PHYSICIAN ASSISTANT

## 2021-11-02 PROCEDURE — 93227 XTRNL ECG REC<48 HR R&I: CPT | Performed by: INTERNAL MEDICINE

## 2021-11-02 RX ADMIN — ALLOPURINOL 100 MG: 100 TABLET ORAL at 09:24

## 2021-11-02 RX ADMIN — ATENOLOL 50 MG: 25 TABLET ORAL at 09:23

## 2021-11-02 RX ADMIN — CLOPIDOGREL BISULFATE 75 MG: 75 TABLET ORAL at 09:24

## 2021-11-02 RX ADMIN — DONEPEZIL HYDROCHLORIDE 5 MG: 5 TABLET ORAL at 22:32

## 2021-11-02 RX ADMIN — PRAVASTATIN SODIUM 40 MG: 20 TABLET ORAL at 09:24

## 2021-11-02 RX ADMIN — AMLODIPINE BESYLATE 5 MG: 5 TABLET ORAL at 09:24

## 2021-11-02 RX ADMIN — ENOXAPARIN SODIUM 40 MG: 100 INJECTION SUBCUTANEOUS at 09:23

## 2021-11-02 RX ADMIN — LISINOPRIL 40 MG: 20 TABLET ORAL at 09:23

## 2021-11-03 LAB
25(OH)D3 SERPL-MCNC: 79.5 NG/ML (ref 30–100)
ANION GAP SERPL CALCULATED.3IONS-SCNC: 9 MMOL/L (ref 4–13)
BACTERIA UR CULT: NORMAL
BUN SERPL-MCNC: 24 MG/DL (ref 7–25)
CALCIUM SERPL-MCNC: 8.6 MG/DL (ref 8.6–10.5)
CHLORIDE SERPL-SCNC: 99 MMOL/L (ref 98–107)
CO2 SERPL-SCNC: 31 MMOL/L (ref 21–31)
CREAT SERPL-MCNC: 1.11 MG/DL (ref 0.6–1.2)
FOLATE SERPL-MCNC: 10.7 NG/ML (ref 3.1–17.5)
GFR SERPL CREATININE-BSD FRML MDRD: 47 ML/MIN/1.73SQ M
GLUCOSE SERPL-MCNC: 92 MG/DL (ref 65–99)
POTASSIUM SERPL-SCNC: 3.5 MMOL/L (ref 3.5–5.5)
PREALB SERPL-MCNC: 13 MG/DL (ref 18–40)
SODIUM SERPL-SCNC: 139 MMOL/L (ref 134–143)
VIT B12 SERPL-MCNC: 216 PG/ML (ref 100–900)

## 2021-11-03 PROCEDURE — 97116 GAIT TRAINING THERAPY: CPT

## 2021-11-03 PROCEDURE — 82306 VITAMIN D 25 HYDROXY: CPT | Performed by: PHYSICIAN ASSISTANT

## 2021-11-03 PROCEDURE — 82746 ASSAY OF FOLIC ACID SERUM: CPT | Performed by: PHYSICIAN ASSISTANT

## 2021-11-03 PROCEDURE — 97530 THERAPEUTIC ACTIVITIES: CPT

## 2021-11-03 PROCEDURE — 97535 SELF CARE MNGMENT TRAINING: CPT

## 2021-11-03 PROCEDURE — 99448 NTRPROF PH1/NTRNET/EHR 21-30: CPT | Performed by: PSYCHIATRY & NEUROLOGY

## 2021-11-03 PROCEDURE — 80048 BASIC METABOLIC PNL TOTAL CA: CPT | Performed by: PHYSICIAN ASSISTANT

## 2021-11-03 PROCEDURE — 82607 VITAMIN B-12: CPT | Performed by: PHYSICIAN ASSISTANT

## 2021-11-03 PROCEDURE — 99232 SBSQ HOSP IP/OBS MODERATE 35: CPT | Performed by: PHYSICIAN ASSISTANT

## 2021-11-03 PROCEDURE — 84134 ASSAY OF PREALBUMIN: CPT | Performed by: PHYSICIAN ASSISTANT

## 2021-11-03 RX ORDER — AMLODIPINE BESYLATE 10 MG/1
10 TABLET ORAL DAILY
Status: DISCONTINUED | OUTPATIENT
Start: 2021-11-04 | End: 2021-11-04 | Stop reason: HOSPADM

## 2021-11-03 RX ORDER — ATORVASTATIN CALCIUM 40 MG/1
40 TABLET, FILM COATED ORAL
Status: DISCONTINUED | OUTPATIENT
Start: 2021-11-04 | End: 2021-11-04 | Stop reason: HOSPADM

## 2021-11-03 RX ADMIN — ALLOPURINOL 100 MG: 100 TABLET ORAL at 08:53

## 2021-11-03 RX ADMIN — LISINOPRIL 40 MG: 20 TABLET ORAL at 08:54

## 2021-11-03 RX ADMIN — PRAVASTATIN SODIUM 40 MG: 20 TABLET ORAL at 08:53

## 2021-11-03 RX ADMIN — ENOXAPARIN SODIUM 40 MG: 100 INJECTION SUBCUTANEOUS at 08:53

## 2021-11-03 RX ADMIN — ATENOLOL 50 MG: 25 TABLET ORAL at 08:53

## 2021-11-03 RX ADMIN — AMLODIPINE BESYLATE 5 MG: 5 TABLET ORAL at 08:54

## 2021-11-03 RX ADMIN — DONEPEZIL HYDROCHLORIDE 5 MG: 5 TABLET ORAL at 22:55

## 2021-11-03 RX ADMIN — CLOPIDOGREL BISULFATE 75 MG: 75 TABLET ORAL at 08:53

## 2021-11-04 ENCOUNTER — HOSPITAL ENCOUNTER (INPATIENT)
Facility: HOSPITAL | Age: 81
LOS: 53 days | Discharge: HOME | End: 2021-12-27
Attending: INTERNAL MEDICINE | Admitting: INTERNAL MEDICINE

## 2021-11-04 ENCOUNTER — TRANSITIONAL CARE MANAGEMENT (OUTPATIENT)
Dept: INTERNAL MEDICINE CLINIC | Facility: CLINIC | Age: 81
End: 2021-11-04

## 2021-11-04 VITALS
DIASTOLIC BLOOD PRESSURE: 60 MMHG | OXYGEN SATURATION: 95 % | WEIGHT: 129 LBS | HEIGHT: 64 IN | RESPIRATION RATE: 20 BRPM | TEMPERATURE: 96.7 F | BODY MASS INDEX: 22.02 KG/M2 | HEART RATE: 50 BPM | SYSTOLIC BLOOD PRESSURE: 136 MMHG

## 2021-11-04 LAB
BACTERIA BLD CULT: NORMAL
BACTERIA BLD CULT: NORMAL
RPR SER QL: NORMAL

## 2021-11-04 PROCEDURE — 99238 HOSP IP/OBS DSCHRG MGMT 30/<: CPT | Performed by: PHYSICIAN ASSISTANT

## 2021-11-04 PROCEDURE — 84425 ASSAY OF VITAMIN B-1: CPT | Performed by: PHYSICIAN ASSISTANT

## 2021-11-04 PROCEDURE — 97110 THERAPEUTIC EXERCISES: CPT

## 2021-11-04 PROCEDURE — 86592 SYPHILIS TEST NON-TREP QUAL: CPT | Performed by: PHYSICIAN ASSISTANT

## 2021-11-04 RX ORDER — AMLODIPINE BESYLATE 10 MG/1
10 TABLET ORAL DAILY
Refills: 0
Start: 2021-11-05

## 2021-11-04 RX ORDER — ATORVASTATIN CALCIUM 40 MG/1
40 TABLET, FILM COATED ORAL
Refills: 0
Start: 2021-11-04

## 2021-11-04 RX ADMIN — LISINOPRIL 40 MG: 20 TABLET ORAL at 08:44

## 2021-11-04 RX ADMIN — AMLODIPINE BESYLATE 10 MG: 10 TABLET ORAL at 08:44

## 2021-11-04 RX ADMIN — CLOPIDOGREL BISULFATE 75 MG: 75 TABLET ORAL at 08:44

## 2021-11-04 RX ADMIN — ALLOPURINOL 100 MG: 100 TABLET ORAL at 08:44

## 2021-11-04 RX ADMIN — ENOXAPARIN SODIUM 40 MG: 100 INJECTION SUBCUTANEOUS at 08:44

## 2021-11-11 LAB — VIT B1 BLD-SCNC: 98.8 NMOL/L (ref 66.5–200)

## 2021-11-29 ENCOUNTER — TELEPHONE (OUTPATIENT)
Dept: INTERNAL MEDICINE CLINIC | Facility: CLINIC | Age: 81
End: 2021-11-29

## 2021-12-01 ENCOUNTER — TELEPHONE (OUTPATIENT)
Dept: INTERNAL MEDICINE CLINIC | Facility: CLINIC | Age: 81
End: 2021-12-01

## 2021-12-02 ENCOUNTER — IMMUNIZATIONS (OUTPATIENT)
Dept: FAMILY MEDICINE CLINIC | Facility: HOSPITAL | Age: 81
End: 2021-12-02

## 2021-12-02 DIAGNOSIS — Z23 ENCOUNTER FOR IMMUNIZATION: Primary | ICD-10-CM

## 2021-12-02 PROCEDURE — 91306 COVID-19 MODERNA VACC 0.25 ML BOOSTER: CPT

## 2021-12-02 PROCEDURE — 0064A COVID-19 MODERNA VACC 0.25 ML BOOSTER: CPT

## 2021-12-16 PROCEDURE — U0003 INFECTIOUS AGENT DETECTION BY NUCLEIC ACID (DNA OR RNA); SEVERE ACUTE RESPIRATORY SYNDROME CORONAVIRUS 2 (SARS-COV-2) (CORONAVIRUS DISEASE [COVID-19]), AMPLIFIED PROBE TECHNIQUE, MAKING USE OF HIGH THROUGHPUT TECHNOLOGIES AS DESCRIBED BY CMS-2020-01-R: HCPCS | Performed by: INTERNAL MEDICINE

## 2021-12-16 PROCEDURE — U0005 INFEC AGEN DETEC AMPLI PROBE: HCPCS | Performed by: INTERNAL MEDICINE

## 2021-12-17 ENCOUNTER — LAB REQUISITION (OUTPATIENT)
Dept: LAB | Facility: HOSPITAL | Age: 81
End: 2021-12-17
Payer: COMMERCIAL

## 2021-12-17 DIAGNOSIS — Z11.59 ENCOUNTER FOR SCREENING FOR OTHER VIRAL DISEASES: ICD-10-CM

## 2021-12-17 LAB — SARS-COV-2 RNA RESP QL NAA+PROBE: NEGATIVE

## 2021-12-23 ENCOUNTER — TRANSITIONAL CARE MANAGEMENT (OUTPATIENT)
Dept: INTERNAL MEDICINE CLINIC | Facility: CLINIC | Age: 81
End: 2021-12-23